# Patient Record
Sex: FEMALE | Race: WHITE | NOT HISPANIC OR LATINO | Employment: OTHER | URBAN - METROPOLITAN AREA
[De-identification: names, ages, dates, MRNs, and addresses within clinical notes are randomized per-mention and may not be internally consistent; named-entity substitution may affect disease eponyms.]

---

## 2019-07-24 DIAGNOSIS — Z12.39 SCREENING BREAST EXAMINATION: Primary | ICD-10-CM

## 2019-08-08 ENCOUNTER — HOSPITAL ENCOUNTER (OUTPATIENT)
Dept: RADIOLOGY | Facility: HOSPITAL | Age: 69
Discharge: HOME | End: 2019-08-08
Payer: MEDICARE

## 2019-08-08 DIAGNOSIS — M54.50 LOW BACK PAIN: Primary | ICD-10-CM

## 2019-08-08 DIAGNOSIS — M54.50 LOW BACK PAIN: ICD-10-CM

## 2019-08-08 PROCEDURE — 72110 X-RAY EXAM L-2 SPINE 4/>VWS: CPT | Mod: TC

## 2019-08-15 ENCOUNTER — HOSPITAL ENCOUNTER (OUTPATIENT)
Dept: RADIOLOGY | Facility: HOSPITAL | Age: 69
Discharge: HOME | End: 2019-08-15
Attending: FAMILY MEDICINE
Payer: MEDICARE

## 2019-08-15 VITALS — WEIGHT: 136 LBS | HEIGHT: 62 IN | BODY MASS INDEX: 25.03 KG/M2

## 2019-08-15 DIAGNOSIS — Z12.39 BREAST CANCER SCREENING: ICD-10-CM

## 2019-08-15 PROCEDURE — 77067 SCR MAMMO BI INCL CAD: CPT | Mod: TC,PO

## 2019-09-10 ENCOUNTER — OFFICE VISIT (OUTPATIENT)
Dept: FAMILY MEDICINE | Facility: CLINIC | Age: 69
End: 2019-09-10
Payer: MEDICARE

## 2019-09-10 VITALS
WEIGHT: 136 LBS | SYSTOLIC BLOOD PRESSURE: 132 MMHG | HEART RATE: 88 BPM | BODY MASS INDEX: 24.87 KG/M2 | DIASTOLIC BLOOD PRESSURE: 70 MMHG

## 2019-09-10 DIAGNOSIS — K21.9 GASTROESOPHAGEAL REFLUX DISEASE, ESOPHAGITIS PRESENCE NOT SPECIFIED: ICD-10-CM

## 2019-09-10 DIAGNOSIS — E78.5 HYPERLIPIDEMIA, UNSPECIFIED HYPERLIPIDEMIA TYPE: ICD-10-CM

## 2019-09-10 DIAGNOSIS — S32.010D CLOSED COMPRESSION FRACTURE OF L1 LUMBAR VERTEBRA WITH ROUTINE HEALING, SUBSEQUENT ENCOUNTER: ICD-10-CM

## 2019-09-10 DIAGNOSIS — F41.9 ANXIETY: ICD-10-CM

## 2019-09-10 DIAGNOSIS — E11.65 UNCONTROLLED TYPE 2 DIABETES MELLITUS WITH HYPERGLYCEMIA: ICD-10-CM

## 2019-09-10 DIAGNOSIS — M89.9 DISEASE OF BONE: ICD-10-CM

## 2019-09-10 DIAGNOSIS — I10 HYPERTENSION, UNSPECIFIED TYPE: Primary | ICD-10-CM

## 2019-09-10 DIAGNOSIS — M51.36 DDD (DEGENERATIVE DISC DISEASE), LUMBAR: ICD-10-CM

## 2019-09-10 PROCEDURE — 99214 PR OFFICE/OUTPT VISIT, EST, LEVL IV, 30-39 MIN: ICD-10-PCS | Mod: S$GLB,,, | Performed by: NURSE PRACTITIONER

## 2019-09-10 PROCEDURE — 99214 OFFICE O/P EST MOD 30 MIN: CPT | Mod: S$GLB,,, | Performed by: NURSE PRACTITIONER

## 2019-09-10 RX ORDER — MELATONIN 5 MG
10 CAPSULE ORAL
COMMUNITY

## 2019-09-10 RX ORDER — ALPRAZOLAM 1 MG/1
1 TABLET ORAL NIGHTLY
Qty: 60 TABLET | Refills: 2 | Status: SHIPPED | OUTPATIENT
Start: 2019-09-10 | End: 2020-03-26 | Stop reason: SDUPTHER

## 2019-09-10 RX ORDER — ATORVASTATIN CALCIUM 40 MG/1
40 TABLET, FILM COATED ORAL DAILY
Qty: 90 TABLET | Refills: 1 | Status: SHIPPED | OUTPATIENT
Start: 2019-09-10 | End: 2019-12-10 | Stop reason: SDUPTHER

## 2019-09-10 RX ORDER — METFORMIN HYDROCHLORIDE 500 MG/1
1 TABLET ORAL 2 TIMES DAILY
COMMUNITY
Start: 2015-11-12 | End: 2019-09-10 | Stop reason: SDUPTHER

## 2019-09-10 RX ORDER — ATORVASTATIN CALCIUM 40 MG/1
1 TABLET, FILM COATED ORAL DAILY
COMMUNITY
Start: 2019-06-12 | End: 2019-09-10 | Stop reason: SDUPTHER

## 2019-09-10 RX ORDER — BENAZEPRIL HYDROCHLORIDE 20 MG/1
20 TABLET ORAL DAILY
Qty: 90 TABLET | Refills: 1 | Status: SHIPPED | OUTPATIENT
Start: 2019-09-10 | End: 2019-12-10 | Stop reason: SDUPTHER

## 2019-09-10 RX ORDER — METFORMIN HYDROCHLORIDE 500 MG/1
500 TABLET ORAL 2 TIMES DAILY
Qty: 180 TABLET | Refills: 1 | Status: SHIPPED | OUTPATIENT
Start: 2019-09-10 | End: 2019-12-10 | Stop reason: SDUPTHER

## 2019-09-10 RX ORDER — HYDROCHLOROTHIAZIDE 12.5 MG/1
12.5 TABLET ORAL DAILY
Qty: 90 TABLET | Refills: 1 | Status: SHIPPED | OUTPATIENT
Start: 2019-09-10 | End: 2019-12-10 | Stop reason: SDUPTHER

## 2019-09-10 NOTE — PATIENT INSTRUCTIONS
Back Pain (Acute or Chronic)    Back pain is one of the most common problems. The good news is that most people feel better in 1 to 2 weeks, and most of the rest in 1 to 2 months. Most people can remain active.  People experience and describe pain differently; not everyone is the same.  · The pain can be sharp, stabbing, shooting, aching, cramping or burning.  · Movement, standing, bending, lifting, sitting, or walking may worsen pain.  · It can be localized to one spot or area, or it can be more generalized.  · It can spread or radiate upwards, to the front, or go down your arms or legs (sciatica).  · It can cause muscle spasm.  Most of the time, mechanical problems with the muscles or spine cause the pain. Mechanical problems are usually caused by an injury to the muscles or ligaments. While illness can cause back pain, it is usually not caused by a serious illness. Mechanical problems include:   · Physical activity such as sports, exercise, work, or normal activity  · Overexertion, lifting, pushing, pulling incorrectly or too aggressively  · Sudden twisting, bending, or stretching from an accident, or accidental movement  · Poor posture  · Stretching or moving wrong, without noticing pain at the time  · Poor coordination, lack of regular exercise (check with your doctor about this)  · Spinal disc disease or arthritis  · Stress  Pain can also be related to pregnancy, or illness like appendicitis, bladder or kidney infections, pelvic infections, and many other things.  Acute back pain usually gets better in 1 to 2 weeks. Back pain related to disk disease, arthritis in the spinal joints or spinal stenosis (narrowing of the spinal canal) can become chronic and last for months or years.  Unless you had a physical injury (for example, a car accident or fall) X-rays are usually not needed for the initial evaluation of back pain. If pain continues and does not respond to medical treatment, X-rays and other tests may be  needed.  Home care  Try these home care recommendations:  · When in bed, try to find a position of comfort. A firm mattress is best. Try lying flat on your back with pillows under your knees. You can also try lying on your side with your knees bent up towards your chest and a pillow between your knees.  · At first, do not try to stretch out the sore spots. If there is a strain, it is not like the good soreness you get after exercising without an injury. In this case, stretching may make it worse.  · Avoid prolong sitting, long car rides, or travel. This puts more stress on the lower back than standing or walking.  · During the first 24 to 72 hours after an acute injury or flare up of chronic back pain, apply an ice pack to the painful area for 20 minutes and then remove it for 20 minutes. Do this over a period of 60 to 90 minutes or several times a day. This will reduce swelling and pain. Wrap the ice pack in a thin towel or plastic to protect your skin.  · You can start with ice, then switch to heat. Heat (hot shower, hot bath, or heating pad) reduces pain and works well for muscle spasms. Heat can be applied to the painful area for 20 minutes then remove it for 20 minutes. Do this over a period of 60 to 90 minutes or several times a day. Do not sleep on a heating pad. It can lead to skin burns or tissue damage.  · You can alternate ice and heat therapy. Talk with your doctor about the best treatment for your back pain.  · Therapeutic massage can help relax the back muscles without stretching them.  · Be aware of safe lifting methods and do not lift anything without stretching first.  Medicines  Talk to your doctor before using medicine, especially if you have other medical problems or are taking other medicines.  · You may use over-the-counter medicine as directed on the bottle to control pain, unless another pain medicine was prescribed. If you have chronic conditions like diabetes, liver or kidney disease,  stomach ulcers, or gastrointestinal bleeding, or are taking blood thinners, talk to your doctor before taking any medicine.  · Be careful if you are given a prescription medicines, narcotics, or medicine for muscle spasms. They can cause drowsiness, affect your coordination, reflexes, and judgement. Do not drive or operate heavy machinery.  Follow-up care  Follow up with your healthcare provider, or as advised.   A radiologist will review any X-rays that were taken. Your provide will notify you of any new findings that may affect your care.  Call 911  Call emergency services if any of the following occur:  · Trouble breathing  · Confusion  · Very drowsy or trouble awakening  · Fainting or loss of consciousness  · Rapid or very slow heart rate  · Loss of bowel or bladder control  When to seek medical advice  Call your healthcare provider right away if any of these occur:   · Pain becomes worse or spreads to your legs  · Weakness or numbness in one or both legs  · Numbness in the groin or genital area  Date Last Reviewed: 7/1/2016  © 6758-7322 [employer] [address] [place] [address], [place] [address]. All rights reserved. This information is not intended as a substitute for professional medical care. Always follow your healthcare professional's instructions.

## 2019-09-10 NOTE — PROGRESS NOTES
Patient ID: Kay Valencia is a 69 y.o. female.    Chief Complaint: Follow-up  Presents for check up. Feeling ok. Under lots of stress. Taking care of dad. Did not take zoloft due to sexual side effects. Scheduled for lumbar procedure with Dr. Mondragon. Had MRI which showed compression fracture on L1. Radiation of pain. No loss of bowel or bladder function. Due for refills. Last labs in June. Watching diet  HPI        Past Medical History:   Diagnosis Date    Anxiety     Depression     Diabetes mellitus, type 2     GERD (gastroesophageal reflux disease)     Hyperlipidemia     Hypertension      Past Surgical History:   Procedure Laterality Date    HYSTERECTOMY      INCONTINENCE SURGERY           Tobacco History:  reports that she has quit smoking. She has never used smokeless tobacco.      Review of patient's allergies indicates:   Allergen Reactions    Flexeril [cyclobenzaprine] Other (See Comments)     Unknown was a long time ago.    Sulfa (sulfonamide antibiotics) Nausea Only    Augmentin [amoxicillin-pot clavulanate] Other (See Comments)     Gas       Current Outpatient Medications:     melatonin 5 mg Cap, Take by mouth., Disp: , Rfl:     metFORMIN (GLUCOPHAGE) 500 MG tablet, Take 1 tablet (500 mg total) by mouth 2 (two) times daily., Disp: 180 tablet, Rfl: 1    ALPRAZolam (XANAX) 1 MG tablet, Take 1 tablet (1 mg total) by mouth every evening., Disp: 60 tablet, Rfl: 2    atorvastatin (LIPITOR) 40 MG tablet, Take 1 tablet (40 mg total) by mouth once daily., Disp: 90 tablet, Rfl: 1    benazepril (LOTENSIN) 20 MG tablet, Take 1 tablet (20 mg total) by mouth once daily., Disp: 90 tablet, Rfl: 1    calcium carbonate/vitamin D3 (CALTRATE 600 PLUS D ORAL), Caltrate 600 plus D, Disp: , Rfl:     estradiol (ESTRACE) 0.01 % (0.1 mg/gram) vaginal cream, Place 1 g vaginally twice a week., Disp: 45 g, Rfl: 6    hydroCHLOROthiazide (HYDRODIURIL) 12.5 MG Tab, Take 1 tablet (12.5 mg total) by mouth once daily.,  Disp: 90 tablet, Rfl: 1    hydrocodone-acetaminophen 5-325mg (NORCO) 5-325 mg per tablet, , Disp: , Rfl:     omega 3-dha-epa-fish oil (FISH OIL) 100-160-1,000 mg Cap, Fish Oil, Disp: , Rfl:     omeprazole (PRILOSEC) 20 MG capsule, , Disp: , Rfl:     Review of Systems   Constitutional: Negative for chills, fever and unexpected weight change.   HENT: Negative for ear pain, rhinorrhea and sore throat.    Eyes: Negative for pain and visual disturbance.   Respiratory: Negative for cough and shortness of breath.    Cardiovascular: Negative for chest pain, palpitations and leg swelling.   Gastrointestinal: Negative for abdominal pain, diarrhea, nausea and vomiting.   Genitourinary: Negative for difficulty urinating and hematuria.   Musculoskeletal: Positive for back pain. Negative for arthralgias.   Skin: Negative for rash.   Neurological: Negative for dizziness, weakness and headaches.   Psychiatric/Behavioral: Positive for agitation. Negative for sleep disturbance. The patient is not nervous/anxious.           Objective:      Vitals:    09/10/19 1302   BP: 132/70   Pulse: 88   Weight: 61.7 kg (136 lb)     Physical Exam   Constitutional: She is oriented to person, place, and time. She appears well-developed and well-nourished.   HENT:   Head: Normocephalic.   Right Ear: External ear normal.   Left Ear: External ear normal.   Mouth/Throat: Oropharynx is clear and moist.   Eyes: Pupils are equal, round, and reactive to light. Conjunctivae are normal.   Neck: Normal range of motion. Neck supple. No JVD present.   Cardiovascular: Normal rate and regular rhythm.   No murmur heard.  Pulmonary/Chest: Effort normal and breath sounds normal.   Abdominal: Soft. Bowel sounds are normal.   Musculoskeletal: She exhibits no edema or deformity.        Lumbar back: She exhibits decreased range of motion and tenderness.   Lymphadenopathy:     She has no cervical adenopathy.   Neurological: She is alert and oriented to person, place,  and time. Gait normal.   Skin: Skin is warm, dry and intact. No rash noted.   Psychiatric: She has a normal mood and affect. Her speech is normal and behavior is normal.         Assessment:       1. Hypertension, unspecified type    2. Uncontrolled type 2 diabetes mellitus with hyperglycemia    3. Anxiety    4. Hyperlipidemia, unspecified hyperlipidemia type    5. Disease of bone    6. Gastroesophageal reflux disease, esophagitis presence not specified    7. Closed compression fracture of L1 lumbar vertebra with routine healing, subsequent encounter    8. DDD (degenerative disc disease), lumbar           Plan:       Hypertension, unspecified type  Comments:  stable  Orders:  -     benazepril (LOTENSIN) 20 MG tablet; Take 1 tablet (20 mg total) by mouth once daily.  Dispense: 90 tablet; Refill: 1  -     hydroCHLOROthiazide (HYDRODIURIL) 12.5 MG Tab; Take 1 tablet (12.5 mg total) by mouth once daily.  Dispense: 90 tablet; Refill: 1    Uncontrolled type 2 diabetes mellitus with hyperglycemia  -     metFORMIN (GLUCOPHAGE) 500 MG tablet; Take 1 tablet (500 mg total) by mouth 2 (two) times daily.  Dispense: 180 tablet; Refill: 1    Anxiety  Comments:  continue xanax prn. call if would like additional meds  Orders:  -     ALPRAZolam (XANAX) 1 MG tablet; Take 1 tablet (1 mg total) by mouth every evening.  Dispense: 60 tablet; Refill: 2    Hyperlipidemia, unspecified hyperlipidemia type  -     atorvastatin (LIPITOR) 40 MG tablet; Take 1 tablet (40 mg total) by mouth once daily.  Dispense: 90 tablet; Refill: 1    Disease of bone  -     DXA Bone Density Spine And Hip; Future; Expected date: 09/10/2019    Gastroesophageal reflux disease, esophagitis presence not specified    Closed compression fracture of L1 lumbar vertebra with routine healing, subsequent encounter  Comments:  keep appointment with dr. burnett as scheduled  Orders:  -     DXA Bone Density Spine And Hip; Future; Expected date: 09/10/2019    DDD (degenerative disc  disease), lumbar      No follow-ups on file.        9/10/2019 Karolnia Ramirez NP

## 2019-09-11 DIAGNOSIS — E11.65 UNCONTROLLED TYPE 2 DIABETES MELLITUS WITH HYPERGLYCEMIA: Primary | ICD-10-CM

## 2019-09-11 RX ORDER — LANCETS 33 GAUGE
1 EACH MISCELLANEOUS
COMMUNITY
End: 2019-09-11 | Stop reason: SDUPTHER

## 2019-09-11 RX ORDER — LANCETS 33 GAUGE
1 EACH MISCELLANEOUS
Qty: 100 EACH | Refills: 3 | Status: SHIPPED | OUTPATIENT
Start: 2019-09-11 | End: 2019-09-17 | Stop reason: SDUPTHER

## 2019-09-11 NOTE — TELEPHONE ENCOUNTER
----- Message from Azul Vivas sent at 9/11/2019 11:42 AM CDT -----  Pt needs refill on one touch lancets delca gauge 33. Test 3 times a week  [facility]  [phone]

## 2019-09-16 ENCOUNTER — HOSPITAL ENCOUNTER (OUTPATIENT)
Dept: RADIOLOGY | Facility: HOSPITAL | Age: 69
Discharge: HOME | End: 2019-09-16
Attending: NURSE PRACTITIONER
Payer: MEDICARE

## 2019-09-16 DIAGNOSIS — M89.9 DISEASE OF BONE: ICD-10-CM

## 2019-09-16 DIAGNOSIS — S32.010D CLOSED COMPRESSION FRACTURE OF L1 LUMBAR VERTEBRA WITH ROUTINE HEALING, SUBSEQUENT ENCOUNTER: ICD-10-CM

## 2019-09-16 PROCEDURE — 77080 DXA BONE DENSITY AXIAL: CPT | Mod: TC,PO

## 2019-09-17 DIAGNOSIS — E11.65 UNCONTROLLED TYPE 2 DIABETES MELLITUS WITH HYPERGLYCEMIA: ICD-10-CM

## 2019-09-17 RX ORDER — LANCETS 33 GAUGE
1 EACH MISCELLANEOUS
Qty: 100 EACH | Refills: 3 | Status: SHIPPED | OUTPATIENT
Start: 2019-09-18 | End: 2021-08-09 | Stop reason: SDUPTHER

## 2019-09-17 NOTE — TELEPHONE ENCOUNTER
----- Message from Olga Kyle sent at 9/17/2019 11:36 AM CDT -----  Contact: Kay Bradshaw One touch delica lancets 33 kenny ultra fine needles. 100 comes in a box.  [facility] in [place] [address]pts # [phone] [facility]   
Set up to be sent.  
Attending Only

## 2019-09-19 ENCOUNTER — TELEPHONE (OUTPATIENT)
Dept: FAMILY MEDICINE | Facility: CLINIC | Age: 69
End: 2019-09-19

## 2019-09-19 NOTE — TELEPHONE ENCOUNTER
Spoke with pt and let her know that the dexa still shows osteopenia but there is an increase in bone density. Pt agrees to continue caltrate.

## 2019-09-19 NOTE — TELEPHONE ENCOUNTER
----- Message from Karolina Ramirez NP sent at 9/18/2019 10:37 PM CDT -----  Dexa shows osteopenia however overall there has been increase in bone density since previous scan.

## 2019-09-20 ENCOUNTER — TELEPHONE (OUTPATIENT)
Dept: FAMILY MEDICINE | Facility: CLINIC | Age: 69
End: 2019-09-20

## 2019-09-20 NOTE — TELEPHONE ENCOUNTER
----- Message from Olga Kyle sent at 9/20/2019  9:10 AM CDT -----  Contact: Kay velasquez The patients back surgery is October.  It was resched . pts # [phone]

## 2019-09-25 ENCOUNTER — TELEPHONE (OUTPATIENT)
Dept: FAMILY MEDICINE | Facility: CLINIC | Age: 69
End: 2019-09-25

## 2019-09-25 DIAGNOSIS — R30.0 DYSURIA: Primary | ICD-10-CM

## 2019-09-27 ENCOUNTER — TELEPHONE (OUTPATIENT)
Dept: FAMILY MEDICINE | Facility: CLINIC | Age: 69
End: 2019-09-27

## 2019-09-27 DIAGNOSIS — N30.00 ACUTE CYSTITIS WITHOUT HEMATURIA: Primary | ICD-10-CM

## 2019-09-27 RX ORDER — CIPROFLOXACIN 500 MG/1
500 TABLET ORAL EVERY 12 HOURS
Qty: 14 TABLET | Refills: 0 | Status: SHIPPED | OUTPATIENT
Start: 2019-09-27 | End: 2019-10-04

## 2019-09-27 NOTE — TELEPHONE ENCOUNTER
Spoke with pt and let her know we sent in Cipro for her. Will call back when we get culture results.

## 2019-09-27 NOTE — TELEPHONE ENCOUNTER
----- Message from Barbara Diaz sent at 9/27/2019 11:01 AM CDT -----  Pt wants to know the results of her urine specimen. She is feeling really miserable.

## 2019-09-27 NOTE — TELEPHONE ENCOUNTER
Please let pt know UA does have a lot of WBCs- I have sent in rx for cipro 500mg BID for 7 days and we'll see what final urine culture shows

## 2019-09-29 LAB
APPEARANCE UR: ABNORMAL
BACTERIA #/AREA URNS HPF: ABNORMAL /HPF
BACTERIA UR CULT: ABNORMAL
BACTERIA UR CULT: ABNORMAL
BILIRUB UR QL STRIP: NEGATIVE
COLOR UR: YELLOW
GLUCOSE UR QL STRIP: ABNORMAL
HGB UR QL STRIP: NEGATIVE
HYALINE CASTS #/AREA URNS LPF: ABNORMAL /LPF
KETONES UR QL STRIP: NEGATIVE
LEUKOCYTE ESTERASE UR QL STRIP: ABNORMAL
NITRITE UR QL STRIP: NEGATIVE
PH UR STRIP: 6 [PH] (ref 5–8)
PROT UR QL STRIP: NEGATIVE
RBC #/AREA URNS HPF: ABNORMAL /HPF
SP GR UR STRIP: 1.01 (ref 1–1.03)
SQUAMOUS #/AREA URNS HPF: ABNORMAL /HPF
WBC #/AREA URNS HPF: ABNORMAL /HPF

## 2019-10-01 ENCOUNTER — TELEPHONE (OUTPATIENT)
Dept: FAMILY MEDICINE | Facility: CLINIC | Age: 69
End: 2019-10-01

## 2019-10-01 NOTE — TELEPHONE ENCOUNTER
----- Message from Karolina Ramirez NP sent at 9/30/2019 10:57 PM CDT -----  cipro that was prescribed per Dr. Juarez will treat infection.

## 2019-11-13 ENCOUNTER — TELEPHONE (OUTPATIENT)
Dept: FAMILY MEDICINE | Facility: CLINIC | Age: 69
End: 2019-11-13

## 2019-11-13 NOTE — TELEPHONE ENCOUNTER
----- Message from Barbara Diaz sent at 11/13/2019 11:48 AM CST -----  Pt is having some back issues and would like to know if she can take ibuprofen or what ever you suggest? Pt #[phone]

## 2019-11-13 NOTE — TELEPHONE ENCOUNTER
Spoke with pt - Informed that ibuprofen 400mg TID will be fine to take with food or milk. Pt verbalized confirmation.

## 2019-12-10 ENCOUNTER — OFFICE VISIT (OUTPATIENT)
Dept: FAMILY MEDICINE | Facility: CLINIC | Age: 69
End: 2019-12-10
Payer: MEDICARE

## 2019-12-10 VITALS
SYSTOLIC BLOOD PRESSURE: 130 MMHG | DIASTOLIC BLOOD PRESSURE: 60 MMHG | WEIGHT: 138 LBS | HEART RATE: 64 BPM | BODY MASS INDEX: 24.45 KG/M2 | HEIGHT: 63 IN

## 2019-12-10 DIAGNOSIS — E78.5 HYPERLIPIDEMIA, UNSPECIFIED HYPERLIPIDEMIA TYPE: ICD-10-CM

## 2019-12-10 DIAGNOSIS — I10 HYPERTENSION, UNSPECIFIED TYPE: ICD-10-CM

## 2019-12-10 DIAGNOSIS — E11.65 UNCONTROLLED TYPE 2 DIABETES MELLITUS WITH HYPERGLYCEMIA: ICD-10-CM

## 2019-12-10 DIAGNOSIS — F41.9 ANXIETY: ICD-10-CM

## 2019-12-10 DIAGNOSIS — M15.9 OSTEOARTHRITIS OF MULTIPLE JOINTS, UNSPECIFIED OSTEOARTHRITIS TYPE: ICD-10-CM

## 2019-12-10 DIAGNOSIS — F51.01 PRIMARY INSOMNIA: Primary | ICD-10-CM

## 2019-12-10 LAB — HBA1C MFR BLD: 5.6 %

## 2019-12-10 PROCEDURE — 3078F PR MOST RECENT DIASTOLIC BLOOD PRESSURE < 80 MM HG: ICD-10-PCS | Mod: S$GLB,,, | Performed by: NURSE PRACTITIONER

## 2019-12-10 PROCEDURE — 3044F PR MOST RECENT HEMOGLOBIN A1C LEVEL <7.0%: ICD-10-PCS | Mod: S$GLB,,, | Performed by: NURSE PRACTITIONER

## 2019-12-10 PROCEDURE — 83036 POCT HEMOGLOBIN A1C: ICD-10-PCS | Mod: QW,,, | Performed by: NURSE PRACTITIONER

## 2019-12-10 PROCEDURE — 3075F SYST BP GE 130 - 139MM HG: CPT | Mod: S$GLB,,, | Performed by: NURSE PRACTITIONER

## 2019-12-10 PROCEDURE — 1159F MED LIST DOCD IN RCRD: CPT | Mod: S$GLB,,, | Performed by: NURSE PRACTITIONER

## 2019-12-10 PROCEDURE — 1101F PT FALLS ASSESS-DOCD LE1/YR: CPT | Mod: S$GLB,,, | Performed by: NURSE PRACTITIONER

## 2019-12-10 PROCEDURE — 3044F HG A1C LEVEL LT 7.0%: CPT | Mod: S$GLB,,, | Performed by: NURSE PRACTITIONER

## 2019-12-10 PROCEDURE — 1101F PR PT FALLS ASSESS DOC 0-1 FALLS W/OUT INJ PAST YR: ICD-10-PCS | Mod: S$GLB,,, | Performed by: NURSE PRACTITIONER

## 2019-12-10 PROCEDURE — 99214 PR OFFICE/OUTPT VISIT, EST, LEVL IV, 30-39 MIN: ICD-10-PCS | Mod: S$GLB,,, | Performed by: NURSE PRACTITIONER

## 2019-12-10 PROCEDURE — 3078F DIAST BP <80 MM HG: CPT | Mod: S$GLB,,, | Performed by: NURSE PRACTITIONER

## 2019-12-10 PROCEDURE — 99214 OFFICE O/P EST MOD 30 MIN: CPT | Mod: S$GLB,,, | Performed by: NURSE PRACTITIONER

## 2019-12-10 PROCEDURE — 83036 HEMOGLOBIN GLYCOSYLATED A1C: CPT | Mod: QW,,, | Performed by: NURSE PRACTITIONER

## 2019-12-10 PROCEDURE — 3075F PR MOST RECENT SYSTOLIC BLOOD PRESS GE 130-139MM HG: ICD-10-PCS | Mod: S$GLB,,, | Performed by: NURSE PRACTITIONER

## 2019-12-10 PROCEDURE — 1159F PR MEDICATION LIST DOCUMENTED IN MEDICAL RECORD: ICD-10-PCS | Mod: S$GLB,,, | Performed by: NURSE PRACTITIONER

## 2019-12-10 RX ORDER — HYDROCHLOROTHIAZIDE 12.5 MG/1
12.5 TABLET ORAL DAILY
Qty: 90 TABLET | Refills: 1 | Status: SHIPPED | OUTPATIENT
Start: 2019-12-10 | End: 2020-12-10 | Stop reason: SDUPTHER

## 2019-12-10 RX ORDER — ATORVASTATIN CALCIUM 40 MG/1
40 TABLET, FILM COATED ORAL DAILY
Qty: 90 TABLET | Refills: 1 | Status: SHIPPED | OUTPATIENT
Start: 2019-12-10 | End: 2020-09-10 | Stop reason: SDUPTHER

## 2019-12-10 RX ORDER — BENAZEPRIL HYDROCHLORIDE 20 MG/1
20 TABLET ORAL DAILY
Qty: 90 TABLET | Refills: 1 | Status: SHIPPED | OUTPATIENT
Start: 2019-12-10 | End: 2020-09-10 | Stop reason: SDUPTHER

## 2019-12-10 RX ORDER — HYDROCODONE BITARTRATE AND ACETAMINOPHEN 10; 325 MG/1; MG/1
TABLET ORAL
Refills: 0 | COMMUNITY
Start: 2019-09-20 | End: 2020-12-10 | Stop reason: SDUPTHER

## 2019-12-10 RX ORDER — ALPRAZOLAM 1 MG/1
1 TABLET ORAL NIGHTLY
Qty: 60 TABLET | Refills: 2 | Status: CANCELLED | OUTPATIENT
Start: 2019-12-10

## 2019-12-10 RX ORDER — CLONAZEPAM 1 MG/1
1 TABLET, ORALLY DISINTEGRATING ORAL 2 TIMES DAILY PRN
Qty: 60 TABLET | Refills: 2 | Status: SHIPPED | OUTPATIENT
Start: 2019-12-10 | End: 2019-12-19

## 2019-12-10 RX ORDER — METFORMIN HYDROCHLORIDE 500 MG/1
500 TABLET ORAL 2 TIMES DAILY
Qty: 180 TABLET | Refills: 1 | Status: SHIPPED | OUTPATIENT
Start: 2019-12-10 | End: 2020-09-10 | Stop reason: SDUPTHER

## 2019-12-10 NOTE — PROGRESS NOTES
SUBJECTIVE:    Patient ID: Kay Valencia is a 69 y.o. female.    Chief Complaint: Follow-up (brought bottles, Xanax goes to [facility]// )    Presents for check up. Doing well. Still helping to care for dad. Receiving dexter in lower back which seem to be helping some. Not sleeping well. Takes xanax about 10ish but wakes up frequently during the night. Does not exercise. Due for labs.       Office Visit on 12/10/2019   Component Date Value Ref Range Status    Hemoglobin A1C 12/10/2019 5.6  % Final   Telephone on 09/25/2019   Component Date Value Ref Range Status    Color, UA 09/26/2019 YELLOW  YELLOW Final    Appearance, UA 09/26/2019 CLOUDY* CLEAR Final    Specific Gravity, UA 09/26/2019 1.013  1.001 - 1.035 Final    pH, UA 09/26/2019 6.0  5.0 - 8.0 Final    Glucose, UA 09/26/2019 1+* NEGATIVE Final    Bilirubin, UA 09/26/2019 NEGATIVE  NEGATIVE Final    Ketones, UA 09/26/2019 NEGATIVE  NEGATIVE Final    Occult Blood UA 09/26/2019 NEGATIVE  NEGATIVE Final    Protein, UA 09/26/2019 NEGATIVE  NEGATIVE Final    Nitrite, UA 09/26/2019 NEGATIVE  NEGATIVE Final    Leukocytes, UA 09/26/2019 3+* NEGATIVE Final    WBC Casts, UA 09/26/2019 > OR = 60* < OR = 5 /HPF Final    RBC Casts, UA 09/26/2019 NONE SEEN  < OR = 2 /HPF Final    Squam Epithel, UA 09/26/2019 NONE SEEN  < OR = 5 /HPF Final    Bacteria, UA 09/26/2019 NONE SEEN  NONE SEEN /HPF Final    Hyaline Casts, UA 09/26/2019 NONE SEEN  NONE SEEN /LPF Final    Reflexive Urine Culture 09/26/2019 CULTURE INDICATED - RESULTS TO FOLLOW   Final    Urine Culture, Routine 09/26/2019 *  Final       Past Medical History:   Diagnosis Date    Anxiety     Depression     Diabetes mellitus, type 2     GERD (gastroesophageal reflux disease)     Hyperlipidemia     Hypertension      Past Surgical History:   Procedure Laterality Date    HYSTERECTOMY      INCONTINENCE SURGERY       Family History   Problem Relation Age of Onset    Diabetes Maternal Grandmother      Hypertension Maternal Grandmother     Diabetes Father     Breast cancer Mother     Hypertension Mother     Diabetes Sister     Ovarian cancer Neg Hx        Marital Status:   Alcohol History:  reports that she does not drink alcohol.  Tobacco History:  reports that she has quit smoking. She has never used smokeless tobacco.  Drug History:  reports that she does not use drugs.    Review of patient's allergies indicates:   Allergen Reactions    Flexeril [cyclobenzaprine] Other (See Comments)     Unknown was a long time ago.    Sulfa (sulfonamide antibiotics) Nausea Only    Augmentin [amoxicillin-pot clavulanate] Other (See Comments)     Gas       Current Outpatient Medications:     ALPRAZolam (XANAX) 1 MG tablet, Take 1 tablet (1 mg total) by mouth every evening., Disp: 60 tablet, Rfl: 2    atorvastatin (LIPITOR) 40 MG tablet, Take 1 tablet (40 mg total) by mouth once daily., Disp: 90 tablet, Rfl: 1    benazepril (LOTENSIN) 20 MG tablet, Take 1 tablet (20 mg total) by mouth once daily., Disp: 90 tablet, Rfl: 1    calcium carbonate/vitamin D3 (CALTRATE 600 PLUS D ORAL), Caltrate 600 plus D, Disp: , Rfl:     hydroCHLOROthiazide (HYDRODIURIL) 12.5 MG Tab, Take 1 tablet (12.5 mg total) by mouth once daily., Disp: 90 tablet, Rfl: 1    HYDROcodone-acetaminophen (NORCO)  mg per tablet, TK 1 T PO  BID PRN, Disp: , Rfl: 0    lancets 33 gauge Misc, 1 lancet by Misc.(Non-Drug; Combo Route) route 3 (three) times a week., Disp: 100 each, Rfl: 3    melatonin 5 mg Cap, Take by mouth., Disp: , Rfl:     metFORMIN (GLUCOPHAGE) 500 MG tablet, Take 1 tablet (500 mg total) by mouth 2 (two) times daily., Disp: 180 tablet, Rfl: 1    omeprazole (PRILOSEC) 20 MG capsule, , Disp: , Rfl:     clonazePAM (KLONOPIN) 1 MG disintegrating tablet, Take 1 tablet (1 mg total) by mouth 2 (two) times daily as needed., Disp: 60 tablet, Rfl: 2    estradiol (ESTRACE) 0.01 % (0.1 mg/gram) vaginal cream, Place 1 g vaginally  "twice a week., Disp: 45 g, Rfl: 6    omega 3-dha-epa-fish oil (FISH OIL) 100-160-1,000 mg Cap, Fish Oil, Disp: , Rfl:     Review of Systems   Constitutional: Negative for chills, fever and unexpected weight change.   HENT: Negative for ear pain, rhinorrhea and sore throat.    Eyes: Negative for pain and visual disturbance.   Respiratory: Negative for cough and shortness of breath.    Cardiovascular: Negative for chest pain, palpitations and leg swelling.   Gastrointestinal: Negative for abdominal pain, diarrhea, nausea and vomiting.   Genitourinary: Negative for difficulty urinating, hematuria and vaginal bleeding.   Musculoskeletal: Positive for back pain. Negative for arthralgias.   Skin: Negative for rash.   Neurological: Negative for dizziness, weakness and headaches.   Psychiatric/Behavioral: Negative for agitation and sleep disturbance. The patient is not nervous/anxious.           Objective:      Vitals:    12/10/19 1131   BP: 130/60   Pulse: 64   Weight: 62.6 kg (138 lb)   Height: 5' 3" (1.6 m)     Body mass index is 24.45 kg/m².  Physical Exam   Constitutional: She is oriented to person, place, and time. She appears well-developed and well-nourished.   HENT:   Head: Normocephalic and atraumatic.   Right Ear: External ear normal.   Left Ear: External ear normal.   Eyes: Pupils are equal, round, and reactive to light. EOM are normal.   Neck: Normal range of motion. Neck supple.   Cardiovascular: Normal rate, regular rhythm and normal heart sounds.   Pulses:       Dorsalis pedis pulses are 2+ on the right side.        Posterior tibial pulses are 2+ on the right side.   Pulmonary/Chest: Effort normal and breath sounds normal.   Abdominal: Soft. Bowel sounds are normal.   Musculoskeletal: Normal range of motion. She exhibits no edema or deformity.        Right foot: There is normal range of motion and no deformity.   Feet:   Right Foot:   Protective Sensation: 5 sites tested. 5 sites sensed.   Left Foot: "   Protective Sensation: 5 sites tested. 5 sites sensed.   Skin Integrity: Negative for skin breakdown.   Lymphadenopathy:     She has no cervical adenopathy.   Neurological: She is alert and oriented to person, place, and time.   Skin: Skin is warm and dry.   Psychiatric: She has a normal mood and affect. Her behavior is normal.         Assessment:       1. Primary insomnia    2. Hyperlipidemia, unspecified hyperlipidemia type    3. Hypertension, unspecified type    4. Uncontrolled type 2 diabetes mellitus with hyperglycemia    5. Anxiety    6. Osteoarthritis of multiple joints, unspecified osteoarthritis type         Plan:       Primary insomnia    Hyperlipidemia, unspecified hyperlipidemia type  -     atorvastatin (LIPITOR) 40 MG tablet; Take 1 tablet (40 mg total) by mouth once daily.  Dispense: 90 tablet; Refill: 1  -     Comprehensive metabolic panel; Future; Expected date: 12/10/2019  -     TSH w/reflex to FT4; Future; Expected date: 12/10/2019    Hypertension, unspecified type  Comments:  stable  Orders:  -     benazepril (LOTENSIN) 20 MG tablet; Take 1 tablet (20 mg total) by mouth once daily.  Dispense: 90 tablet; Refill: 1  -     hydroCHLOROthiazide (HYDRODIURIL) 12.5 MG Tab; Take 1 tablet (12.5 mg total) by mouth once daily.  Dispense: 90 tablet; Refill: 1  -     CBC auto differential; Future; Expected date: 12/10/2019  -     Comprehensive metabolic panel; Future; Expected date: 12/10/2019  -     TSH w/reflex to FT4; Future; Expected date: 12/10/2019  -     Lipid panel; Future; Expected date: 12/10/2019    Uncontrolled type 2 diabetes mellitus with hyperglycemia  -     metFORMIN (GLUCOPHAGE) 500 MG tablet; Take 1 tablet (500 mg total) by mouth 2 (two) times daily.  Dispense: 180 tablet; Refill: 1  -     Hemoglobin A1C, POCT  -     Urinalysis, Reflex to Urine Culture Urine, Clean Catch; Future; Expected date: 12/10/2019  -     Lipid panel; Future; Expected date: 12/10/2019    Anxiety  Comments:  try  klonopin. stop xanax  Orders:  -     clonazePAM (KLONOPIN) 1 MG disintegrating tablet; Take 1 tablet (1 mg total) by mouth 2 (two) times daily as needed.  Dispense: 60 tablet; Refill: 2  -     Lipid panel; Future; Expected date: 12/10/2019    Osteoarthritis of multiple joints, unspecified osteoarthritis type      Follow up in about 6 months (around 6/10/2020) for Diabetic Check-Up.

## 2019-12-12 LAB
ALBUMIN SERPL-MCNC: 4.5 G/DL (ref 3.6–5.1)
ALBUMIN/GLOB SERPL: 2 (CALC) (ref 1–2.5)
ALP SERPL-CCNC: 72 U/L (ref 33–130)
ALT SERPL-CCNC: 22 U/L (ref 6–29)
APPEARANCE UR: CLEAR
AST SERPL-CCNC: 18 U/L (ref 10–35)
BACTERIA #/AREA URNS HPF: ABNORMAL /HPF
BACTERIA UR CULT: ABNORMAL
BACTERIA UR CULT: NORMAL
BASOPHILS # BLD AUTO: 49 CELLS/UL (ref 0–200)
BASOPHILS NFR BLD AUTO: 1.3 %
BILIRUB SERPL-MCNC: 0.9 MG/DL (ref 0.2–1.2)
BILIRUB UR QL STRIP: NEGATIVE
BUN SERPL-MCNC: 19 MG/DL (ref 7–25)
BUN/CREAT SERPL: ABNORMAL (CALC) (ref 6–22)
CALCIUM SERPL-MCNC: 10 MG/DL (ref 8.6–10.4)
CHLORIDE SERPL-SCNC: 101 MMOL/L (ref 98–110)
CHOLEST SERPL-MCNC: 206 MG/DL
CHOLEST/HDLC SERPL: 3.5 (CALC)
CO2 SERPL-SCNC: 28 MMOL/L (ref 20–32)
COLOR UR: YELLOW
CREAT SERPL-MCNC: 0.8 MG/DL (ref 0.5–0.99)
EOSINOPHIL # BLD AUTO: 201 CELLS/UL (ref 15–500)
EOSINOPHIL NFR BLD AUTO: 5.3 %
ERYTHROCYTE [DISTWIDTH] IN BLOOD BY AUTOMATED COUNT: 13.1 % (ref 11–15)
GFRSERPLBLD MDRD-ARVRAT: 76 ML/MIN/1.73M2
GLOBULIN SER CALC-MCNC: 2.3 G/DL (CALC) (ref 1.9–3.7)
GLUCOSE SERPL-MCNC: 108 MG/DL (ref 65–99)
GLUCOSE UR QL STRIP: NEGATIVE
HCT VFR BLD AUTO: 34.5 % (ref 35–45)
HDLC SERPL-MCNC: 59 MG/DL
HGB BLD-MCNC: 12.1 G/DL (ref 11.7–15.5)
HGB UR QL STRIP: NEGATIVE
HYALINE CASTS #/AREA URNS LPF: ABNORMAL /LPF
KETONES UR QL STRIP: NEGATIVE
LDLC SERPL CALC-MCNC: 113 MG/DL (CALC)
LEUKOCYTE ESTERASE UR QL STRIP: ABNORMAL
LYMPHOCYTES # BLD AUTO: 1079 CELLS/UL (ref 850–3900)
LYMPHOCYTES NFR BLD AUTO: 28.4 %
MCH RBC QN AUTO: 31.7 PG (ref 27–33)
MCHC RBC AUTO-ENTMCNC: 35.1 G/DL (ref 32–36)
MCV RBC AUTO: 90.3 FL (ref 80–100)
MONOCYTES # BLD AUTO: 369 CELLS/UL (ref 200–950)
MONOCYTES NFR BLD AUTO: 9.7 %
NEUTROPHILS # BLD AUTO: 2101 CELLS/UL (ref 1500–7800)
NEUTROPHILS NFR BLD AUTO: 55.3 %
NITRITE UR QL STRIP: NEGATIVE
NONHDLC SERPL-MCNC: 147 MG/DL (CALC)
PH UR STRIP: 6.5 [PH] (ref 5–8)
PLATELET # BLD AUTO: 144 THOUSAND/UL (ref 140–400)
PMV BLD REES-ECKER: 11.5 FL (ref 7.5–12.5)
POTASSIUM SERPL-SCNC: 4 MMOL/L (ref 3.5–5.3)
PROT SERPL-MCNC: 6.8 G/DL (ref 6.1–8.1)
PROT UR QL STRIP: NEGATIVE
RBC # BLD AUTO: 3.82 MILLION/UL (ref 3.8–5.1)
RBC #/AREA URNS HPF: ABNORMAL /HPF
SODIUM SERPL-SCNC: 140 MMOL/L (ref 135–146)
SP GR UR STRIP: 1.01 (ref 1–1.03)
SQUAMOUS #/AREA URNS HPF: ABNORMAL /HPF
TRIGL SERPL-MCNC: 224 MG/DL
TSH SERPL-ACNC: 0.4 MIU/L (ref 0.4–4.5)
WBC # BLD AUTO: 3.8 THOUSAND/UL (ref 3.8–10.8)
WBC #/AREA URNS HPF: ABNORMAL /HPF

## 2019-12-19 NOTE — TELEPHONE ENCOUNTER
Spoke with pt, Wants to try Trazodone or Temazepam along with the Xanax. Since she was not able to take the klonopin and xanax together. Aware eva is out until Monday and states it is okay to wait until then.

## 2019-12-19 NOTE — TELEPHONE ENCOUNTER
----- Message from Zuhair Morgan sent at 12/19/2019  3:54 PM CST -----  Pt is saying that the klonopin makes her heart race needing something else called in   Pt [phone]

## 2019-12-20 RX ORDER — TRAZODONE HYDROCHLORIDE 50 MG/1
50 TABLET ORAL NIGHTLY
Qty: 30 TABLET | Refills: 2 | Status: SHIPPED | OUTPATIENT
Start: 2019-12-20 | End: 2020-06-10

## 2019-12-31 ENCOUNTER — TELEPHONE (OUTPATIENT)
Dept: FAMILY MEDICINE | Facility: CLINIC | Age: 69
End: 2019-12-31

## 2019-12-31 NOTE — TELEPHONE ENCOUNTER
Spoke to patient with results verbatim per Karolina. Verbalized understanding. Wants to know if she can start her Meloxicam again. Send message to pool as Jami is leaving shortly.

## 2019-12-31 NOTE — TELEPHONE ENCOUNTER
----- Message from Karolina Ramirez NP sent at 12/31/2019  3:03 PM CST -----  Triglycerides are slightly elevated.  Start low-fat diet.  The rest of the labs are within normal limits.

## 2020-03-26 DIAGNOSIS — F41.9 ANXIETY: ICD-10-CM

## 2020-03-26 RX ORDER — ALPRAZOLAM 1 MG/1
1 TABLET ORAL NIGHTLY
Qty: 30 TABLET | Refills: 2 | Status: SHIPPED | OUTPATIENT
Start: 2020-03-26 | End: 2020-07-06 | Stop reason: SDUPTHER

## 2020-03-26 NOTE — TELEPHONE ENCOUNTER
----- Message from Faiza Grace sent at 3/26/2020  8:43 AM CDT -----  Contact: Kay Redd  Pt needs refill on her Alprazolam   Send to [facility] in [address] [place]  Pt# [phone]

## 2020-04-02 ENCOUNTER — TELEPHONE (OUTPATIENT)
Dept: FAMILY MEDICINE | Facility: CLINIC | Age: 70
End: 2020-04-02

## 2020-04-02 ENCOUNTER — OFFICE VISIT (OUTPATIENT)
Dept: FAMILY MEDICINE | Facility: CLINIC | Age: 70
End: 2020-04-02
Payer: MEDICARE

## 2020-04-02 DIAGNOSIS — L02.91 ABSCESS: Primary | ICD-10-CM

## 2020-04-02 DIAGNOSIS — F51.01 PRIMARY INSOMNIA: ICD-10-CM

## 2020-04-02 DIAGNOSIS — I10 ESSENTIAL HYPERTENSION: ICD-10-CM

## 2020-04-02 PROCEDURE — 99213 OFFICE O/P EST LOW 20 MIN: CPT | Mod: 95,,, | Performed by: NURSE PRACTITIONER

## 2020-04-02 PROCEDURE — 1101F PR PT FALLS ASSESS DOC 0-1 FALLS W/OUT INJ PAST YR: ICD-10-PCS | Mod: 95,,, | Performed by: NURSE PRACTITIONER

## 2020-04-02 PROCEDURE — 1101F PT FALLS ASSESS-DOCD LE1/YR: CPT | Mod: 95,,, | Performed by: NURSE PRACTITIONER

## 2020-04-02 PROCEDURE — 1159F PR MEDICATION LIST DOCUMENTED IN MEDICAL RECORD: ICD-10-PCS | Mod: 95,,, | Performed by: NURSE PRACTITIONER

## 2020-04-02 PROCEDURE — 1159F MED LIST DOCD IN RCRD: CPT | Mod: 95,,, | Performed by: NURSE PRACTITIONER

## 2020-04-02 PROCEDURE — 99213 PR OFFICE/OUTPT VISIT, EST, LEVL III, 20-29 MIN: ICD-10-PCS | Mod: 95,,, | Performed by: NURSE PRACTITIONER

## 2020-04-02 RX ORDER — MUPIROCIN 20 MG/G
OINTMENT TOPICAL 3 TIMES DAILY
Qty: 22 G | Refills: 0 | Status: SHIPPED | OUTPATIENT
Start: 2020-04-02 | End: 2020-04-12

## 2020-04-02 RX ORDER — CEPHALEXIN 500 MG/1
500 CAPSULE ORAL EVERY 8 HOURS
Qty: 21 CAPSULE | Refills: 0 | Status: SHIPPED | OUTPATIENT
Start: 2020-04-02 | End: 2020-06-10

## 2020-04-02 NOTE — PROGRESS NOTES
Subjective:        The chief complaint leading to consultation is: sore on bottom  The patient location is:  Home  Visit type: Virtual visit with synchronous audio and video  This was a video visit in lieu of in-person visit due to the coronavirus emergency. Patient acknowledged and consented to the video visit encounter.     69-year-old female presents for virtual visit.  She reports possible well on buttocks.  Noticed about 3 days ago.  No drainage from site.  No fever.  Patient does have a history of abscesses.  Has not taken anything.  Reports that has been trying to keep site clean.    Stress is manageable.  Sleeping okay.          Past Surgical History:   Procedure Laterality Date    HYSTERECTOMY      INCONTINENCE SURGERY       Past Medical History:   Diagnosis Date    Anxiety     Depression     Diabetes mellitus, type 2     GERD (gastroesophageal reflux disease)     Hyperlipidemia     Hypertension      Family History   Problem Relation Age of Onset    Diabetes Maternal Grandmother     Hypertension Maternal Grandmother     Diabetes Father     Breast cancer Mother     Hypertension Mother     Diabetes Sister     Ovarian cancer Neg Hx         Social History:   Marital Status:   Alcohol History:  reports that she does not drink alcohol.  Tobacco History:  reports that she has quit smoking. She has never used smokeless tobacco.  Drug History:  reports that she does not use drugs.    Review of patient's allergies indicates:   Allergen Reactions    Flexeril [cyclobenzaprine] Other (See Comments)     Unknown was a long time ago.    Sulfa (sulfonamide antibiotics) Nausea Only    Augmentin [amoxicillin-pot clavulanate] Other (See Comments)     Gas       Current Outpatient Medications   Medication Sig Dispense Refill    ALPRAZolam (XANAX) 1 MG tablet Take 1 tablet (1 mg total) by mouth every evening. 30 tablet 2    atorvastatin (LIPITOR) 40 MG tablet Take 1 tablet (40 mg total) by mouth  once daily. 90 tablet 1    benazepril (LOTENSIN) 20 MG tablet Take 1 tablet (20 mg total) by mouth once daily. 90 tablet 1    calcium carbonate/vitamin D3 (CALTRATE 600 PLUS D ORAL) Caltrate 600 plus D      cephALEXin (KEFLEX) 500 MG capsule Take 1 capsule (500 mg total) by mouth every 8 (eight) hours. 21 capsule 0    estradiol (ESTRACE) 0.01 % (0.1 mg/gram) vaginal cream Place 1 g vaginally twice a week. 45 g 6    hydroCHLOROthiazide (HYDRODIURIL) 12.5 MG Tab Take 1 tablet (12.5 mg total) by mouth once daily. 90 tablet 1    HYDROcodone-acetaminophen (NORCO)  mg per tablet TK 1 T PO  BID PRN  0    lancets 33 gauge Misc 1 lancet by Misc.(Non-Drug; Combo Route) route 3 (three) times a week. 100 each 3    melatonin 5 mg Cap Take by mouth.      metFORMIN (GLUCOPHAGE) 500 MG tablet Take 1 tablet (500 mg total) by mouth 2 (two) times daily. 180 tablet 1    mupirocin (BACTROBAN) 2 % ointment Apply topically 3 (three) times daily. for 10 days 22 g 0    omega 3-dha-epa-fish oil (FISH OIL) 100-160-1,000 mg Cap Fish Oil      omeprazole (PRILOSEC) 20 MG capsule       traZODone (DESYREL) 50 MG tablet Take 1 tablet (50 mg total) by mouth every evening. 30 tablet 2     No current facility-administered medications for this visit.        Review of Systems   Constitutional: Negative for chills, fever and unexpected weight change.   Eyes: Negative for pain.   Respiratory: Negative for cough and shortness of breath.    Cardiovascular: Negative for chest pain, palpitations and leg swelling.   Musculoskeletal: Negative for arthralgias.   Skin: Positive for wound. Negative for rash.        Patient reports red area on right glute.  No drainage or pustule   Psychiatric/Behavioral: Negative for agitation and sleep disturbance. The patient is not nervous/anxious.          Objective:        Physical Exam:   Physical Exam   Constitutional: She appears well-developed and well-nourished.            Assessment:       1. Abscess     2. Essential hypertension    3. Primary insomnia      Plan:   Abscess  Comments:  Keep site clean and dry.  Apply Bactroban 3 times a day.  wamr compresses.  Keflex if symptoms worsen.  Orders:  -     cephALEXin (KEFLEX) 500 MG capsule; Take 1 capsule (500 mg total) by mouth every 8 (eight) hours.  Dispense: 21 capsule; Refill: 0  -     mupirocin (BACTROBAN) 2 % ointment; Apply topically 3 (three) times daily. for 10 days  Dispense: 22 g; Refill: 0    Essential hypertension    Primary insomnia      Follow up in about 3 months (around 7/2/2020) for medication management.    Total time spent with patient: 15min    Each patient to whom he or she provides medical services by telemedicine is:  (1) informed of the relationship between the physician and patient and the respective role of any other health care provider with respect to management of the patient; and (2) notified that he or she may decline to receive medical services by telemedicine and may withdraw from such care at any time.    This note was created using [employer] voice recognition software that occasionally misinterprets phrases or words.

## 2020-04-02 NOTE — TELEPHONE ENCOUNTER
Health Maintenance Due   Topic Date Due   • Influenza Vaccine (1 of 2) 09/01/2019   • MMR Vaccine (1 of 2 - Standard series) 12/10/2019   • Varicella Vaccine (1 of 2 - 2-dose childhood series) 12/10/2019   • HIB Vaccine (4 of 4 - Standard series) 12/10/2019   • Hepatitis A Vaccine (1 of 2 - 2-dose series) 12/10/2019   • Pneumococcal Vaccine 0-64 (4 of 4) 12/10/2019       Patient is due for topics as listed above but is not proceeding with Immunization(s) Influenza at this time. Declined.            Pt is downloading chika and will call back for VV

## 2020-06-02 ENCOUNTER — TELEPHONE (OUTPATIENT)
Dept: FAMILY MEDICINE | Facility: CLINIC | Age: 70
End: 2020-06-02

## 2020-06-02 NOTE — TELEPHONE ENCOUNTER
Spoke to pt regarding her appt on 6/10. Offered virtual visit. Pt stated she wants to keep her in office appt. Advised pt to wear mask

## 2020-06-10 ENCOUNTER — OFFICE VISIT (OUTPATIENT)
Dept: FAMILY MEDICINE | Facility: CLINIC | Age: 70
End: 2020-06-10
Payer: MEDICARE

## 2020-06-10 VITALS
TEMPERATURE: 98 F | BODY MASS INDEX: 26.87 KG/M2 | HEIGHT: 62 IN | SYSTOLIC BLOOD PRESSURE: 128 MMHG | DIASTOLIC BLOOD PRESSURE: 78 MMHG | WEIGHT: 146 LBS | HEART RATE: 68 BPM

## 2020-06-10 DIAGNOSIS — Z23 NEED FOR STREPTOCOCCUS PNEUMONIAE VACCINATION: ICD-10-CM

## 2020-06-10 DIAGNOSIS — E11.65 UNCONTROLLED TYPE 2 DIABETES MELLITUS WITH HYPERGLYCEMIA: Primary | ICD-10-CM

## 2020-06-10 DIAGNOSIS — Z79.899 HIGH RISK MEDICATION USE: ICD-10-CM

## 2020-06-10 DIAGNOSIS — F32.A DEPRESSION, UNSPECIFIED DEPRESSION TYPE: ICD-10-CM

## 2020-06-10 DIAGNOSIS — I10 HYPERTENSION, UNSPECIFIED TYPE: ICD-10-CM

## 2020-06-10 DIAGNOSIS — F41.9 ANXIETY: ICD-10-CM

## 2020-06-10 DIAGNOSIS — E78.5 HYPERLIPIDEMIA, UNSPECIFIED HYPERLIPIDEMIA TYPE: ICD-10-CM

## 2020-06-10 DIAGNOSIS — F51.01 PRIMARY INSOMNIA: ICD-10-CM

## 2020-06-10 DIAGNOSIS — M15.9 OSTEOARTHRITIS OF MULTIPLE JOINTS, UNSPECIFIED OSTEOARTHRITIS TYPE: ICD-10-CM

## 2020-06-10 LAB — HBA1C MFR BLD: 6.1 %

## 2020-06-10 PROCEDURE — 3078F DIAST BP <80 MM HG: CPT | Mod: S$GLB,,, | Performed by: NURSE PRACTITIONER

## 2020-06-10 PROCEDURE — 3078F PR MOST RECENT DIASTOLIC BLOOD PRESSURE < 80 MM HG: ICD-10-PCS | Mod: S$GLB,,, | Performed by: NURSE PRACTITIONER

## 2020-06-10 PROCEDURE — 90732 PNEUMOCOCCAL POLYSACCHARIDE VACCINE 23-VALENT =>2YO SQ IM: ICD-10-PCS | Mod: S$GLB,,, | Performed by: NURSE PRACTITIONER

## 2020-06-10 PROCEDURE — 83036 HEMOGLOBIN GLYCOSYLATED A1C: CPT | Mod: 59,QW,, | Performed by: NURSE PRACTITIONER

## 2020-06-10 PROCEDURE — 99214 OFFICE O/P EST MOD 30 MIN: CPT | Mod: 25,S$GLB,, | Performed by: NURSE PRACTITIONER

## 2020-06-10 PROCEDURE — 1101F PT FALLS ASSESS-DOCD LE1/YR: CPT | Mod: S$GLB,,, | Performed by: NURSE PRACTITIONER

## 2020-06-10 PROCEDURE — G0009 PNEUMOCOCCAL POLYSACCHARIDE VACCINE 23-VALENT =>2YO SQ IM: ICD-10-PCS | Mod: S$GLB,,, | Performed by: NURSE PRACTITIONER

## 2020-06-10 PROCEDURE — 3044F HG A1C LEVEL LT 7.0%: CPT | Mod: S$GLB,,, | Performed by: NURSE PRACTITIONER

## 2020-06-10 PROCEDURE — 3044F PR MOST RECENT HEMOGLOBIN A1C LEVEL <7.0%: ICD-10-PCS | Mod: S$GLB,,, | Performed by: NURSE PRACTITIONER

## 2020-06-10 PROCEDURE — 1159F MED LIST DOCD IN RCRD: CPT | Mod: S$GLB,,, | Performed by: NURSE PRACTITIONER

## 2020-06-10 PROCEDURE — 3074F SYST BP LT 130 MM HG: CPT | Mod: S$GLB,,, | Performed by: NURSE PRACTITIONER

## 2020-06-10 PROCEDURE — 1159F PR MEDICATION LIST DOCUMENTED IN MEDICAL RECORD: ICD-10-PCS | Mod: S$GLB,,, | Performed by: NURSE PRACTITIONER

## 2020-06-10 PROCEDURE — 99214 PR OFFICE/OUTPT VISIT, EST, LEVL IV, 30-39 MIN: ICD-10-PCS | Mod: 25,S$GLB,, | Performed by: NURSE PRACTITIONER

## 2020-06-10 PROCEDURE — 83036 POCT HEMOGLOBIN A1C: ICD-10-PCS | Mod: 59,QW,, | Performed by: NURSE PRACTITIONER

## 2020-06-10 PROCEDURE — 90732 PPSV23 VACC 2 YRS+ SUBQ/IM: CPT | Mod: S$GLB,,, | Performed by: NURSE PRACTITIONER

## 2020-06-10 PROCEDURE — 1101F PR PT FALLS ASSESS DOC 0-1 FALLS W/OUT INJ PAST YR: ICD-10-PCS | Mod: S$GLB,,, | Performed by: NURSE PRACTITIONER

## 2020-06-10 PROCEDURE — 3074F PR MOST RECENT SYSTOLIC BLOOD PRESSURE < 130 MM HG: ICD-10-PCS | Mod: S$GLB,,, | Performed by: NURSE PRACTITIONER

## 2020-06-10 PROCEDURE — G0009 ADMIN PNEUMOCOCCAL VACCINE: HCPCS | Mod: S$GLB,,, | Performed by: NURSE PRACTITIONER

## 2020-06-10 RX ORDER — MELOXICAM 15 MG/1
15 TABLET ORAL DAILY
Qty: 90 TABLET | Refills: 1 | Status: SHIPPED | OUTPATIENT
Start: 2020-06-10 | End: 2020-12-10 | Stop reason: SDUPTHER

## 2020-06-10 RX ORDER — MELOXICAM 15 MG/1
15 TABLET ORAL DAILY
COMMUNITY
End: 2020-06-10 | Stop reason: SDUPTHER

## 2020-06-10 RX ORDER — TEMAZEPAM 30 MG/1
30 CAPSULE ORAL NIGHTLY PRN
Qty: 30 CAPSULE | Refills: 2 | Status: SHIPPED | OUTPATIENT
Start: 2020-06-10 | End: 2020-07-10

## 2020-06-10 NOTE — PROGRESS NOTES
SUBJECTIVE:    Patient ID: Kay Valencia is a 69 y.o. female.    Chief Complaint: Follow-up (brought bottles// VM)    69-year-old female presents for check up.  Currently treated for diabetes hypertension hyperlipidemia anxiety and arthritis.  Reports that has not been watching diet as much as usual.  Check sugar daily.  Highest reading was 150.  Currently not sleeping well.  Reports has trouble falling asleep and staying asleep.  Currently taking Xanax and melatonin with little improvement of symptoms.  Due for labs.  Patient reports that she will have done today.      Office Visit on 06/10/2020   Component Date Value Ref Range Status    Hemoglobin A1C 06/10/2020 6.1  % Final       Past Medical History:   Diagnosis Date    Anxiety     Depression     Diabetes mellitus, type 2     GERD (gastroesophageal reflux disease)     Hyperlipidemia     Hypertension      Past Surgical History:   Procedure Laterality Date    HYSTERECTOMY      INCONTINENCE SURGERY       Family History   Problem Relation Age of Onset    Diabetes Maternal Grandmother     Hypertension Maternal Grandmother     Diabetes Father     Breast cancer Mother     Hypertension Mother     Diabetes Sister     Ovarian cancer Neg Hx        Marital Status:   Alcohol History:  reports that she does not drink alcohol.  Tobacco History:  reports that she has quit smoking. She has never used smokeless tobacco.  Drug History:  reports that she does not use drugs.    Review of patient's allergies indicates:   Allergen Reactions    Flexeril [cyclobenzaprine] Other (See Comments)     Unknown was a long time ago.    Sulfa (sulfonamide antibiotics) Nausea Only    Augmentin [amoxicillin-pot clavulanate] Other (See Comments)     Gas       Current Outpatient Medications:     ALPRAZolam (XANAX) 1 MG tablet, Take 1 tablet (1 mg total) by mouth every evening., Disp: 30 tablet, Rfl: 2    atorvastatin (LIPITOR) 40 MG tablet, Take 1 tablet (40 mg total)  by mouth once daily., Disp: 90 tablet, Rfl: 1    benazepril (LOTENSIN) 20 MG tablet, Take 1 tablet (20 mg total) by mouth once daily., Disp: 90 tablet, Rfl: 1    calcium carbonate/vitamin D3 (CALTRATE 600 PLUS D ORAL), Caltrate 600 plus D, Disp: , Rfl:     hydroCHLOROthiazide (HYDRODIURIL) 12.5 MG Tab, Take 1 tablet (12.5 mg total) by mouth once daily., Disp: 90 tablet, Rfl: 1    HYDROcodone-acetaminophen (NORCO)  mg per tablet, TK 1 T PO  BID PRN, Disp: , Rfl: 0    lancets 33 gauge Misc, 1 lancet by Misc.(Non-Drug; Combo Route) route 3 (three) times a week., Disp: 100 each, Rfl: 3    melatonin 5 mg Cap, Take by mouth., Disp: , Rfl:     meloxicam (MOBIC) 15 MG tablet, Take 1 tablet (15 mg total) by mouth once daily., Disp: 90 tablet, Rfl: 1    metFORMIN (GLUCOPHAGE) 500 MG tablet, Take 1 tablet (500 mg total) by mouth 2 (two) times daily., Disp: 180 tablet, Rfl: 1    omega 3-dha-epa-fish oil (FISH OIL) 100-160-1,000 mg Cap, Fish Oil, Disp: , Rfl:     omeprazole (PRILOSEC) 20 MG capsule, , Disp: , Rfl:     estradiol (ESTRACE) 0.01 % (0.1 mg/gram) vaginal cream, Place 1 g vaginally twice a week., Disp: 45 g, Rfl: 6    temazepam (RESTORIL) 30 mg capsule, Take 1 capsule (30 mg total) by mouth nightly as needed for Insomnia., Disp: 30 capsule, Rfl: 2    Review of Systems   Constitutional: Negative for chills, fever and unexpected weight change.   HENT: Negative for ear pain, rhinorrhea and sore throat.    Eyes: Negative for pain and visual disturbance.   Respiratory: Negative for cough and shortness of breath.    Cardiovascular: Negative for chest pain, palpitations and leg swelling.   Gastrointestinal: Negative for abdominal pain, diarrhea, nausea and vomiting.   Genitourinary: Negative for difficulty urinating, hematuria and vaginal bleeding.   Musculoskeletal: Negative for arthralgias.   Skin: Negative for rash.   Neurological: Negative for dizziness, weakness and headaches.   Psychiatric/Behavioral:  "Negative for agitation and sleep disturbance. The patient is not nervous/anxious.           Objective:      Vitals:    06/10/20 1119   BP: 128/78   Pulse: 68   Temp: 98 °F (36.7 °C)   TempSrc: Oral   Weight: 66.2 kg (146 lb)   Height: 5' 2" (1.575 m)     Body mass index is 26.7 kg/m².  Physical Exam   Constitutional: She is oriented to person, place, and time. She appears well-developed and well-nourished.   HENT:   Right Ear: External ear normal.   Left Ear: External ear normal.   Mouth/Throat: Oropharynx is clear and moist.   Eyes: Conjunctivae are normal.   Neck: Normal range of motion. Neck supple. No JVD present.   Cardiovascular: Normal rate and regular rhythm.   No murmur heard.  Pulmonary/Chest: Effort normal and breath sounds normal.   Abdominal: Soft. Bowel sounds are normal.   Musculoskeletal: Normal range of motion. She exhibits no edema or deformity.   Feet:   Right Foot:   Protective Sensation: 5 sites tested. 5 sites sensed.   Left Foot:   Protective Sensation: 5 sites tested. 5 sites sensed.   Lymphadenopathy:     She has no cervical adenopathy.   Neurological: She is alert and oriented to person, place, and time. Gait normal.   Skin: Skin is warm, dry and intact. No rash noted.   Psychiatric: She has a normal mood and affect. Her speech is normal and behavior is normal.         Assessment:       1. Uncontrolled type 2 diabetes mellitus with hyperglycemia    2. Primary insomnia    3. Osteoarthritis of multiple joints, unspecified osteoarthritis type    4. Hypertension, unspecified type    5. Depression, unspecified depression type    6. Anxiety    7. Hyperlipidemia, unspecified hyperlipidemia type    8. High risk medication use         Plan:       Uncontrolled type 2 diabetes mellitus with hyperglycemia  -     Hemoglobin A1C, POCT    Primary insomnia  Comments:  Try Restoril for sleep.  Do not take with Xanax.    Osteoarthritis of multiple joints, unspecified osteoarthritis type    Hypertension, " unspecified type    Depression, unspecified depression type    Anxiety    Hyperlipidemia, unspecified hyperlipidemia type  -     Lipid Panel; Future; Expected date: 06/10/2020  -     Comprehensive metabolic panel; Future; Expected date: 06/10/2020    High risk medication use  -     Lipid Panel; Future; Expected date: 06/10/2020  -     Comprehensive metabolic panel; Future; Expected date: 06/10/2020    Other orders  -     meloxicam (MOBIC) 15 MG tablet; Take 1 tablet (15 mg total) by mouth once daily.  Dispense: 90 tablet; Refill: 1  -     temazepam (RESTORIL) 30 mg capsule; Take 1 capsule (30 mg total) by mouth nightly as needed for Insomnia.  Dispense: 30 capsule; Refill: 2      Follow up in about 3 months (around 9/10/2020) for medication management.

## 2020-06-11 LAB
ALBUMIN SERPL-MCNC: 4.6 G/DL (ref 3.6–5.1)
ALBUMIN/GLOB SERPL: 2.1 (CALC) (ref 1–2.5)
ALP SERPL-CCNC: 65 U/L (ref 37–153)
ALT SERPL-CCNC: 24 U/L (ref 6–29)
AST SERPL-CCNC: 18 U/L (ref 10–35)
BILIRUB SERPL-MCNC: 0.8 MG/DL (ref 0.2–1.2)
BUN SERPL-MCNC: 24 MG/DL (ref 7–25)
BUN/CREAT SERPL: ABNORMAL (CALC) (ref 6–22)
CALCIUM SERPL-MCNC: 10.2 MG/DL (ref 8.6–10.4)
CHLORIDE SERPL-SCNC: 103 MMOL/L (ref 98–110)
CHOLEST SERPL-MCNC: 206 MG/DL
CHOLEST/HDLC SERPL: 3.8 (CALC)
CO2 SERPL-SCNC: 28 MMOL/L (ref 20–32)
CREAT SERPL-MCNC: 0.82 MG/DL (ref 0.5–0.99)
GFRSERPLBLD MDRD-ARVRAT: 74 ML/MIN/1.73M2
GLOBULIN SER CALC-MCNC: 2.2 G/DL (CALC) (ref 1.9–3.7)
GLUCOSE SERPL-MCNC: 115 MG/DL (ref 65–99)
HDLC SERPL-MCNC: 54 MG/DL
LDLC SERPL CALC-MCNC: 111 MG/DL (CALC)
NONHDLC SERPL-MCNC: 152 MG/DL (CALC)
POTASSIUM SERPL-SCNC: 3.9 MMOL/L (ref 3.5–5.3)
PROT SERPL-MCNC: 6.8 G/DL (ref 6.1–8.1)
SODIUM SERPL-SCNC: 140 MMOL/L (ref 135–146)
TRIGL SERPL-MCNC: 284 MG/DL

## 2020-07-06 DIAGNOSIS — F41.9 ANXIETY: ICD-10-CM

## 2020-07-06 RX ORDER — ALPRAZOLAM 1 MG/1
1 TABLET ORAL NIGHTLY
Qty: 30 TABLET | Refills: 2 | Status: SHIPPED | OUTPATIENT
Start: 2020-07-06 | End: 2020-10-30 | Stop reason: SDUPTHER

## 2020-07-06 NOTE — TELEPHONE ENCOUNTER
----- Message from Zuhair Morgan sent at 7/6/2020 10:55 AM CDT -----  Regarding: refills  Xanax   Pharm [facility] [id number]   Pt [phone]

## 2020-09-08 ENCOUNTER — TELEPHONE (OUTPATIENT)
Dept: FAMILY MEDICINE | Facility: CLINIC | Age: 70
End: 2020-09-08

## 2020-09-08 NOTE — TELEPHONE ENCOUNTER
----- Message from Faiza Graec sent at 9/8/2020  8:43 AM CDT -----  Regarding: Returning nurse call  Contact: Kay Valencia  Pt says that for her appt this week, she will come in the office not virtual.

## 2020-09-10 ENCOUNTER — OFFICE VISIT (OUTPATIENT)
Dept: FAMILY MEDICINE | Facility: CLINIC | Age: 70
End: 2020-09-10
Payer: MEDICARE

## 2020-09-10 VITALS
WEIGHT: 147 LBS | BODY MASS INDEX: 27.05 KG/M2 | SYSTOLIC BLOOD PRESSURE: 134 MMHG | TEMPERATURE: 98 F | DIASTOLIC BLOOD PRESSURE: 78 MMHG | HEIGHT: 62 IN | HEART RATE: 70 BPM

## 2020-09-10 DIAGNOSIS — M51.36 DDD (DEGENERATIVE DISC DISEASE), LUMBAR: ICD-10-CM

## 2020-09-10 DIAGNOSIS — E11.65 UNCONTROLLED TYPE 2 DIABETES MELLITUS WITH HYPERGLYCEMIA: ICD-10-CM

## 2020-09-10 DIAGNOSIS — I10 HYPERTENSION, UNSPECIFIED TYPE: ICD-10-CM

## 2020-09-10 DIAGNOSIS — E78.5 HYPERLIPIDEMIA, UNSPECIFIED HYPERLIPIDEMIA TYPE: ICD-10-CM

## 2020-09-10 DIAGNOSIS — M15.9 OSTEOARTHRITIS OF MULTIPLE JOINTS, UNSPECIFIED OSTEOARTHRITIS TYPE: ICD-10-CM

## 2020-09-10 DIAGNOSIS — F51.01 PRIMARY INSOMNIA: ICD-10-CM

## 2020-09-10 DIAGNOSIS — F41.9 ANXIETY: ICD-10-CM

## 2020-09-10 DIAGNOSIS — Z79.899 HIGH RISK MEDICATION USE: Primary | ICD-10-CM

## 2020-09-10 DIAGNOSIS — J32.9 SINUSITIS, UNSPECIFIED CHRONICITY, UNSPECIFIED LOCATION: ICD-10-CM

## 2020-09-10 LAB — HBA1C MFR BLD: 6.5 %

## 2020-09-10 PROCEDURE — 3044F HG A1C LEVEL LT 7.0%: CPT | Mod: S$GLB,,, | Performed by: NURSE PRACTITIONER

## 2020-09-10 PROCEDURE — 3008F BODY MASS INDEX DOCD: CPT | Mod: S$GLB,,, | Performed by: NURSE PRACTITIONER

## 2020-09-10 PROCEDURE — 3044F PR MOST RECENT HEMOGLOBIN A1C LEVEL <7.0%: ICD-10-PCS | Mod: S$GLB,,, | Performed by: NURSE PRACTITIONER

## 2020-09-10 PROCEDURE — 3075F PR MOST RECENT SYSTOLIC BLOOD PRESS GE 130-139MM HG: ICD-10-PCS | Mod: S$GLB,,, | Performed by: NURSE PRACTITIONER

## 2020-09-10 PROCEDURE — 1101F PT FALLS ASSESS-DOCD LE1/YR: CPT | Mod: S$GLB,,, | Performed by: NURSE PRACTITIONER

## 2020-09-10 PROCEDURE — 1159F PR MEDICATION LIST DOCUMENTED IN MEDICAL RECORD: ICD-10-PCS | Mod: S$GLB,,, | Performed by: NURSE PRACTITIONER

## 2020-09-10 PROCEDURE — 99214 OFFICE O/P EST MOD 30 MIN: CPT | Mod: S$GLB,,, | Performed by: NURSE PRACTITIONER

## 2020-09-10 PROCEDURE — 83036 POCT HEMOGLOBIN A1C: ICD-10-PCS | Mod: QW,,, | Performed by: NURSE PRACTITIONER

## 2020-09-10 PROCEDURE — 99214 PR OFFICE/OUTPT VISIT, EST, LEVL IV, 30-39 MIN: ICD-10-PCS | Mod: S$GLB,,, | Performed by: NURSE PRACTITIONER

## 2020-09-10 PROCEDURE — 1159F MED LIST DOCD IN RCRD: CPT | Mod: S$GLB,,, | Performed by: NURSE PRACTITIONER

## 2020-09-10 PROCEDURE — 83036 HEMOGLOBIN GLYCOSYLATED A1C: CPT | Mod: QW,,, | Performed by: NURSE PRACTITIONER

## 2020-09-10 PROCEDURE — 3008F PR BODY MASS INDEX (BMI) DOCUMENTED: ICD-10-PCS | Mod: S$GLB,,, | Performed by: NURSE PRACTITIONER

## 2020-09-10 PROCEDURE — 1101F PR PT FALLS ASSESS DOC 0-1 FALLS W/OUT INJ PAST YR: ICD-10-PCS | Mod: S$GLB,,, | Performed by: NURSE PRACTITIONER

## 2020-09-10 PROCEDURE — 3075F SYST BP GE 130 - 139MM HG: CPT | Mod: S$GLB,,, | Performed by: NURSE PRACTITIONER

## 2020-09-10 PROCEDURE — 3078F DIAST BP <80 MM HG: CPT | Mod: S$GLB,,, | Performed by: NURSE PRACTITIONER

## 2020-09-10 PROCEDURE — 3078F PR MOST RECENT DIASTOLIC BLOOD PRESSURE < 80 MM HG: ICD-10-PCS | Mod: S$GLB,,, | Performed by: NURSE PRACTITIONER

## 2020-09-10 RX ORDER — TEMAZEPAM 30 MG/1
CAPSULE ORAL
COMMUNITY
Start: 2020-07-17 | End: 2020-12-07 | Stop reason: SDUPTHER

## 2020-09-10 RX ORDER — MINERAL OIL
180 ENEMA (ML) RECTAL DAILY
Qty: 30 TABLET | Refills: 0 | Status: SHIPPED | OUTPATIENT
Start: 2020-09-10 | End: 2020-12-10

## 2020-09-10 RX ORDER — METFORMIN HYDROCHLORIDE 500 MG/1
500 TABLET ORAL 2 TIMES DAILY
Qty: 180 TABLET | Refills: 1 | Status: SHIPPED | OUTPATIENT
Start: 2020-09-10 | End: 2020-12-10 | Stop reason: SDUPTHER

## 2020-09-10 RX ORDER — INSULIN PUMP SYRINGE, 3 ML
EACH MISCELLANEOUS
Qty: 1 EACH | Refills: 1 | Status: SHIPPED | OUTPATIENT
Start: 2020-09-10 | End: 2021-08-09

## 2020-09-10 RX ORDER — BENAZEPRIL HYDROCHLORIDE 20 MG/1
20 TABLET ORAL DAILY
Qty: 90 TABLET | Refills: 1 | Status: SHIPPED | OUTPATIENT
Start: 2020-09-10 | End: 2020-12-10 | Stop reason: SDUPTHER

## 2020-09-10 RX ORDER — ATORVASTATIN CALCIUM 40 MG/1
40 TABLET, FILM COATED ORAL DAILY
Qty: 90 TABLET | Refills: 1 | Status: SHIPPED | OUTPATIENT
Start: 2020-09-10 | End: 2020-12-10 | Stop reason: SDUPTHER

## 2020-09-10 RX ORDER — AZITHROMYCIN 250 MG/1
TABLET, FILM COATED ORAL
Qty: 6 TABLET | Refills: 0 | Status: SHIPPED | OUTPATIENT
Start: 2020-09-10 | End: 2020-09-15

## 2020-09-10 NOTE — PROGRESS NOTES
SUBJECTIVE:    Patient ID: Kay Valencia is a 70 y.o. female.    Chief Complaint: Follow-up ((brought bottles))    70-year-old female presents for check up.  Currently treated for diabetes hypertension hyperlipidemia anxiety and arthritis.  Reports that has not been watching diet as much as usual.  Check sugar daily.  Snacking and not as active.  Currently sleeping better. Still some family stressors. Has been having trouble with sinus congestion. No fever. Post nasal drip. Facial pressure.       Office Visit on 09/10/2020   Component Date Value Ref Range Status    WBC 09/10/2020 4.8  3.8 - 10.8 Thousand/uL Final    RBC 09/10/2020 4.09  3.80 - 5.10 Million/uL Final    Hemoglobin 09/10/2020 12.3  11.7 - 15.5 g/dL Final    Hematocrit 09/10/2020 37.6  35.0 - 45.0 % Final    Mean Corpuscular Volume 09/10/2020 91.9  80.0 - 100.0 fL Final    Mean Corpuscular Hemoglobin 09/10/2020 30.1  27.0 - 33.0 pg Final    Mean Corpuscular Hemoglobin Conc 09/10/2020 32.7  32.0 - 36.0 g/dL Final    RDW 09/10/2020 12.9  11.0 - 15.0 % Final    Platelets 09/10/2020 140  140 - 400 Thousand/uL Final    MPV 09/10/2020 11.1  7.5 - 12.5 fL Final    Neutrophils Absolute 09/10/2020 2,966  1,500 - 7,800 cells/uL Final    Lymph # 09/10/2020 1,056  850 - 3,900 cells/uL Final    Mono # 09/10/2020 437  200 - 950 cells/uL Final    Eos # 09/10/2020 269  15 - 500 cells/uL Final    Baso # 09/10/2020 72  0 - 200 cells/uL Final    Neutrophils Relative 09/10/2020 61.8  % Final    Lymph% 09/10/2020 22.0  % Final    Mono% 09/10/2020 9.1  % Final    Eosinophil% 09/10/2020 5.6  % Final    Basophil% 09/10/2020 1.5  % Final    Glucose 09/10/2020 113* 65 - 99 mg/dL Final    BUN, Bld 09/10/2020 27* 7 - 25 mg/dL Final    Creatinine 09/10/2020 0.89  0.50 - 0.99 mg/dL Final    eGFR if non African American 09/10/2020 66  > OR = 60 mL/min/1.73m2 Final    eGFR if African American 09/10/2020 77  > OR = 60 mL/min/1.73m2 Final    BUN/Creatinine  Ratio 09/10/2020 30* 6 - 22 (calc) Final    Sodium 09/10/2020 140  135 - 146 mmol/L Final    Potassium 09/10/2020 3.8  3.5 - 5.3 mmol/L Final    Chloride 09/10/2020 101  98 - 110 mmol/L Final    CO2 09/10/2020 28  20 - 32 mmol/L Final    Calcium 09/10/2020 9.7  8.6 - 10.4 mg/dL Final    Total Protein 09/10/2020 6.8  6.1 - 8.1 g/dL Final    Albumin 09/10/2020 4.6  3.6 - 5.1 g/dL Final    Globulin, Total 09/10/2020 2.2  1.9 - 3.7 g/dL (calc) Final    Albumin/Globulin Ratio 09/10/2020 2.1  1.0 - 2.5 (calc) Final    Total Bilirubin 09/10/2020 0.8  0.2 - 1.2 mg/dL Final    Alkaline Phosphatase 09/10/2020 73  37 - 153 U/L Final    AST 09/10/2020 18  10 - 35 U/L Final    ALT 09/10/2020 29  6 - 29 U/L Final    Cholesterol 09/10/2020 219* <200 mg/dL Final    HDL 09/10/2020 51  > OR = 50 mg/dL Final    Triglycerides 09/10/2020 257* <150 mg/dL Final    LDL Cholesterol 09/10/2020 128* mg/dL (calc) Final    Hdl/Cholesterol Ratio 09/10/2020 4.3  <5.0 (calc) Final    Non HDL Chol. (LDL+VLDL) 09/10/2020 168* <130 mg/dL (calc) Final    TSH w/reflex to FT4 09/10/2020 0.54  0.40 - 4.50 mIU/L Final    Creatinine, Random Ur 09/10/2020 53  20 - 275 mg/dL Final    Microalb, Ur 09/10/2020 0.9  See Note: mg/dL Final    Microalb Creat Ratio 09/10/2020 17  <30 mcg/mg creat Final    Color, UA 09/10/2020 YELLOW  YELLOW Final    Appearance, UA 09/10/2020 CLEAR  CLEAR Final    Specific [place], UA 09/10/2020 1.013  1.001 - 1.035 Final    pH, UA 09/10/2020 6.5  5.0 - 8.0 Final    Glucose, UA 09/10/2020 NEGATIVE  NEGATIVE Final    Bilirubin, UA 09/10/2020 NEGATIVE  NEGATIVE Final    Ketones, UA 09/10/2020 NEGATIVE  NEGATIVE Final    Occult Blood UA 09/10/2020 NEGATIVE  NEGATIVE Final    Protein, UA 09/10/2020 NEGATIVE  NEGATIVE Final    Nitrite, UA 09/10/2020 NEGATIVE  NEGATIVE Final    Leukocytes, UA 09/10/2020 TRACE* NEGATIVE Final    WBC Casts, UA 09/10/2020 NONE SEEN  < OR = 5 /HPF Final    RBC Casts, UA  09/10/2020 NONE SEEN  < OR = 2 /HPF Final    Squam Epithel, UA 09/10/2020 NONE SEEN  < OR = 5 /HPF Final    Bacteria, UA 09/10/2020 NONE SEEN  NONE SEEN /HPF Final    Hyaline Casts, UA 09/10/2020 NONE SEEN  NONE SEEN /LPF Final    Reflexive Urine Culture 09/10/2020 CULTURE INDICATED - RESULTS TO FOLLOW   Final    Urine Culture, Routine 09/10/2020    Final   Office Visit on 06/10/2020   Component Date Value Ref Range Status    Hemoglobin A1C 06/10/2020 6.1  % Final    Cholesterol 06/10/2020 206* <200 mg/dL Final    HDL 06/10/2020 54  > OR = 50 mg/dL Final    Triglycerides 06/10/2020 284* <150 mg/dL Final    LDL Cholesterol 06/10/2020 111* mg/dL (calc) Final    Hdl/Cholesterol Ratio 06/10/2020 3.8  <5.0 (calc) Final    Non HDL Chol. (LDL+VLDL) 06/10/2020 152* <130 mg/dL (calc) Final    Glucose 06/10/2020 115* 65 - 99 mg/dL Final    BUN, Bld 06/10/2020 24  7 - 25 mg/dL Final    Creatinine 06/10/2020 0.82  0.50 - 0.99 mg/dL Final    eGFR if non African American 06/10/2020 74  > OR = 60 mL/min/1.73m2 Final    eGFR if African American 06/10/2020 85  > OR = 60 mL/min/1.73m2 Final    BUN/Creatinine Ratio 06/10/2020 NOT APPLICABLE  6 - 22 (calc) Final    Sodium 06/10/2020 140  135 - 146 mmol/L Final    Potassium 06/10/2020 3.9  3.5 - 5.3 mmol/L Final    Chloride 06/10/2020 103  98 - 110 mmol/L Final    CO2 06/10/2020 28  20 - 32 mmol/L Final    Calcium 06/10/2020 10.2  8.6 - 10.4 mg/dL Final    Total Protein 06/10/2020 6.8  6.1 - 8.1 g/dL Final    Albumin 06/10/2020 4.6  3.6 - 5.1 g/dL Final    Globulin, Total 06/10/2020 2.2  1.9 - 3.7 g/dL (calc) Final    Albumin/Globulin Ratio 06/10/2020 2.1  1.0 - 2.5 (calc) Final    Total Bilirubin 06/10/2020 0.8  0.2 - 1.2 mg/dL Final    Alkaline Phosphatase 06/10/2020 65  37 - 153 U/L Final    AST 06/10/2020 18  10 - 35 U/L Final    ALT 06/10/2020 24  6 - 29 U/L Final       Past Medical History:   Diagnosis Date    Anxiety     Depression     Diabetes  mellitus, type 2     GERD (gastroesophageal reflux disease)     Hyperlipidemia     Hypertension      Social History     Socioeconomic History    Marital status:      Spouse name: Not on file    Number of children: Not on file    Years of education: Not on file    Highest education level: Not on file   Occupational History    Not on file   Social Needs    Financial resource strain: Not very hard    Food insecurity     Worry: Not on file     Inability: Not on file    Transportation needs     Medical: No     Non-medical: No   Tobacco Use    Smoking status: Former Smoker    Smokeless tobacco: Never Used   Substance and Sexual Activity    Alcohol use: No     Frequency: Patient refused     Drinks per session: Patient refused     Binge frequency: Never    Drug use: No    Sexual activity: Yes   Lifestyle    Physical activity     Days per week: Not on file     Minutes per session: Not on file    Stress: To some extent   Relationships    Social connections     Talks on phone: More than three times a week     Gets together: Twice a week     Attends Gnosticist service: Not on file     Active member of club or organization: No     Attends meetings of clubs or organizations: Never     Relationship status:    Other Topics Concern    Not on file   Social History Narrative    Not on file     Past Surgical History:   Procedure Laterality Date    HYSTERECTOMY      INCONTINENCE SURGERY       Family History   Problem Relation Age of Onset    Diabetes Maternal Grandmother     Hypertension Maternal Grandmother     Diabetes Father     Breast cancer Mother     Hypertension Mother     Diabetes Sister     Ovarian cancer Neg Hx        Review of patient's allergies indicates:   Allergen Reactions    Flexeril [cyclobenzaprine] Other (See Comments)     Unknown was a long time ago.    Sulfa (sulfonamide antibiotics) Nausea Only    Augmentin [amoxicillin-pot clavulanate] Other (See Comments)     Gas        Current Outpatient Medications:     ALPRAZolam (XANAX) 1 MG tablet, Take 1 tablet (1 mg total) by mouth every evening., Disp: 30 tablet, Rfl: 2    atorvastatin (LIPITOR) 40 MG tablet, Take 1 tablet (40 mg total) by mouth once daily., Disp: 90 tablet, Rfl: 1    benazepriL (LOTENSIN) 20 MG tablet, Take 1 tablet (20 mg total) by mouth once daily., Disp: 90 tablet, Rfl: 1    hydroCHLOROthiazide (HYDRODIURIL) 12.5 MG Tab, Take 1 tablet (12.5 mg total) by mouth once daily., Disp: 90 tablet, Rfl: 1    meloxicam (MOBIC) 15 MG tablet, Take 1 tablet (15 mg total) by mouth once daily., Disp: 90 tablet, Rfl: 1    metFORMIN (GLUCOPHAGE) 500 MG tablet, Take 1 tablet (500 mg total) by mouth 2 (two) times daily., Disp: 180 tablet, Rfl: 1    omeprazole (PRILOSEC) 20 MG capsule, Take 20 mg by mouth 2 (two) times a day. , Disp: , Rfl:     temazepam (RESTORIL) 30 mg capsule, TK ONE C PO QHS PRF INSOMNIA, Disp: , Rfl:     azithromycin (Z-WES) 250 MG tablet, Take 2 tablets by mouth on day 1; Take 1 tablet by mouth on days 2-5, Disp: 6 tablet, Rfl: 0    blood-glucose meter kit, Use as instructed, Disp: 1 each, Rfl: 1    calcium carbonate/vitamin D3 (CALTRATE 600 PLUS D ORAL), Caltrate 600 plus D, Disp: , Rfl:     estradiol (ESTRACE) 0.01 % (0.1 mg/gram) vaginal cream, Place 1 g vaginally twice a week., Disp: 45 g, Rfl: 6    fexofenadine (ALLEGRA) 180 MG tablet, Take 1 tablet (180 mg total) by mouth once daily., Disp: 30 tablet, Rfl: 0    HYDROcodone-acetaminophen (NORCO)  mg per tablet, TK 1 T PO  BID PRN, Disp: , Rfl: 0    lancets 33 gauge Misc, 1 lancet by Misc.(Non-Drug; Combo Route) route 3 (three) times a week., Disp: 100 each, Rfl: 3    melatonin 5 mg Cap, Take by mouth., Disp: , Rfl:     omega 3-dha-epa-fish oil (FISH OIL) 100-160-1,000 mg Cap, Fish Oil, Disp: , Rfl:     Review of Systems   Constitutional: Negative for chills, fever and unexpected weight change.   HENT: Positive for postnasal drip,  "sinus pressure and sinus pain. Negative for ear pain, rhinorrhea and sore throat.    Eyes: Negative for pain and visual disturbance.   Respiratory: Negative for cough and shortness of breath.    Cardiovascular: Negative for chest pain, palpitations and leg swelling.   Gastrointestinal: Negative for abdominal pain, diarrhea, nausea and vomiting.   Genitourinary: Negative for difficulty urinating, hematuria and vaginal bleeding.   Musculoskeletal: Negative for arthralgias.   Skin: Negative for rash.   Neurological: Negative for dizziness, weakness and headaches.   Psychiatric/Behavioral: Negative for agitation and sleep disturbance. The patient is not nervous/anxious.           Objective:      Vitals:    09/10/20 1103   BP: 134/78   Pulse: 70   Temp: 98.2 °F (36.8 °C)   Weight: 66.7 kg (147 lb)   Height: 5' 2" (1.575 m)     Physical Exam  Constitutional:       Appearance: She is well-developed.   HENT:      Right Ear: External ear normal.      Left Ear: External ear normal.      Nose:      Right Sinus: Frontal sinus tenderness present.      Left Sinus: Frontal sinus tenderness present.   Eyes:      Pupils: Pupils are equal, round, and reactive to light.   Neck:      Musculoskeletal: Normal range of motion and neck supple.   Cardiovascular:      Rate and Rhythm: Normal rate and regular rhythm.      Pulses:           Dorsalis pedis pulses are 2+ on the right side.        Posterior tibial pulses are 2+ on the right side.      Heart sounds: Normal heart sounds.   Pulmonary:      Effort: Pulmonary effort is normal.      Breath sounds: Normal breath sounds.   Abdominal:      General: Bowel sounds are normal.      Palpations: Abdomen is soft.   Musculoskeletal: Normal range of motion.         General: No deformity.      Right foot: Normal range of motion. No deformity.   Feet:      Right foot:      Protective Sensation: 5 sites tested. 5 sites sensed.      Left foot:      Protective Sensation: 5 sites tested. 5 sites sensed. "      Skin integrity: No skin breakdown.   Lymphadenopathy:      Cervical: No cervical adenopathy.   Skin:     General: Skin is warm and dry.   Neurological:      Mental Status: She is alert and oriented to person, place, and time.   Psychiatric:         Behavior: Behavior normal.           Assessment:       1. High risk medication use    2. Uncontrolled type 2 diabetes mellitus with hyperglycemia    3. Hypertension, unspecified type    4. Hyperlipidemia, unspecified hyperlipidemia type    5. Anxiety    6. Sinusitis, unspecified chronicity, unspecified location    7. Primary insomnia    8. Osteoarthritis of multiple joints, unspecified osteoarthritis type    9. DDD (degenerative disc disease), lumbar         Plan:       High risk medication use  -     CBC auto differential; Future; Expected date: 09/10/2020  -     Comprehensive metabolic panel; Future; Expected date: 09/10/2020  -     Lipid Panel; Future; Expected date: 09/10/2020  -     TSH w/reflex to FT4; Future; Expected date: 09/10/2020  -     Microalbumin/creatinine urine ratio; Future; Expected date: 09/10/2020  -     Urinalysis, Reflex to Urine Culture Urine, Clean Catch; Future; Expected date: 09/10/2020    Uncontrolled type 2 diabetes mellitus with hyperglycemia  Comments:  start diet. increase activity  Orders:  -     metFORMIN (GLUCOPHAGE) 500 MG tablet; Take 1 tablet (500 mg total) by mouth 2 (two) times daily.  Dispense: 180 tablet; Refill: 1  -     blood-glucose meter kit; Use as instructed  Dispense: 1 each; Refill: 1  -     Microalbumin/creatinine urine ratio; Future; Expected date: 09/10/2020  -     Hemoglobin A1C, POCT    Hypertension, unspecified type  Comments:  stable  Orders:  -     benazepriL (LOTENSIN) 20 MG tablet; Take 1 tablet (20 mg total) by mouth once daily.  Dispense: 90 tablet; Refill: 1  -     CBC auto differential; Future; Expected date: 09/10/2020  -     Comprehensive metabolic panel; Future; Expected date:  09/10/2020    Hyperlipidemia, unspecified hyperlipidemia type  -     atorvastatin (LIPITOR) 40 MG tablet; Take 1 tablet (40 mg total) by mouth once daily.  Dispense: 90 tablet; Refill: 1  -     Lipid Panel; Future; Expected date: 09/10/2020    Anxiety  -     CBC auto differential; Future; Expected date: 09/10/2020  -     Comprehensive metabolic panel; Future; Expected date: 09/10/2020  -     Lipid Panel; Future; Expected date: 09/10/2020  -     TSH w/reflex to FT4; Future; Expected date: 09/10/2020  -     Microalbumin/creatinine urine ratio; Future; Expected date: 09/10/2020  -     Urinalysis, Reflex to Urine Culture Urine, Clean Catch; Future; Expected date: 09/10/2020    Sinusitis, unspecified chronicity, unspecified location  -     azithromycin (Z-WES) 250 MG tablet; Take 2 tablets by mouth on day 1; Take 1 tablet by mouth on days 2-5  Dispense: 6 tablet; Refill: 0  -     fexofenadine (ALLEGRA) 180 MG tablet; Take 1 tablet (180 mg total) by mouth once daily.  Dispense: 30 tablet; Refill: 0    Primary insomnia    Osteoarthritis of multiple joints, unspecified osteoarthritis type    DDD (degenerative disc disease), lumbar    Other orders  -     Urine culture      Follow up in about 3 months (around 12/10/2020) for medication management, Diabetic Check-Up.        9/15/2020 Karolina Ramirez

## 2020-09-11 LAB
ALBUMIN SERPL-MCNC: 4.6 G/DL (ref 3.6–5.1)
ALBUMIN/GLOB SERPL: 2.1 (CALC) (ref 1–2.5)
ALP SERPL-CCNC: 73 U/L (ref 37–153)
ALT SERPL-CCNC: 29 U/L (ref 6–29)
AST SERPL-CCNC: 18 U/L (ref 10–35)
BASOPHILS # BLD AUTO: 72 CELLS/UL (ref 0–200)
BASOPHILS NFR BLD AUTO: 1.5 %
BILIRUB SERPL-MCNC: 0.8 MG/DL (ref 0.2–1.2)
BUN SERPL-MCNC: 27 MG/DL (ref 7–25)
BUN/CREAT SERPL: 30 (CALC) (ref 6–22)
CALCIUM SERPL-MCNC: 9.7 MG/DL (ref 8.6–10.4)
CHLORIDE SERPL-SCNC: 101 MMOL/L (ref 98–110)
CHOLEST SERPL-MCNC: 219 MG/DL
CHOLEST/HDLC SERPL: 4.3 (CALC)
CO2 SERPL-SCNC: 28 MMOL/L (ref 20–32)
CREAT SERPL-MCNC: 0.89 MG/DL (ref 0.5–0.99)
EOSINOPHIL # BLD AUTO: 269 CELLS/UL (ref 15–500)
EOSINOPHIL NFR BLD AUTO: 5.6 %
ERYTHROCYTE [DISTWIDTH] IN BLOOD BY AUTOMATED COUNT: 12.9 % (ref 11–15)
GFRSERPLBLD MDRD-ARVRAT: 66 ML/MIN/1.73M2
GLOBULIN SER CALC-MCNC: 2.2 G/DL (CALC) (ref 1.9–3.7)
GLUCOSE SERPL-MCNC: 113 MG/DL (ref 65–99)
HCT VFR BLD AUTO: 37.6 % (ref 35–45)
HDLC SERPL-MCNC: 51 MG/DL
HGB BLD-MCNC: 12.3 G/DL (ref 11.7–15.5)
LDLC SERPL CALC-MCNC: 128 MG/DL (CALC)
LYMPHOCYTES # BLD AUTO: 1056 CELLS/UL (ref 850–3900)
LYMPHOCYTES NFR BLD AUTO: 22 %
MCH RBC QN AUTO: 30.1 PG (ref 27–33)
MCHC RBC AUTO-ENTMCNC: 32.7 G/DL (ref 32–36)
MCV RBC AUTO: 91.9 FL (ref 80–100)
MONOCYTES # BLD AUTO: 437 CELLS/UL (ref 200–950)
MONOCYTES NFR BLD AUTO: 9.1 %
NEUTROPHILS # BLD AUTO: 2966 CELLS/UL (ref 1500–7800)
NEUTROPHILS NFR BLD AUTO: 61.8 %
NONHDLC SERPL-MCNC: 168 MG/DL (CALC)
PLATELET # BLD AUTO: 140 THOUSAND/UL (ref 140–400)
PMV BLD REES-ECKER: 11.1 FL (ref 7.5–12.5)
POTASSIUM SERPL-SCNC: 3.8 MMOL/L (ref 3.5–5.3)
PROT SERPL-MCNC: 6.8 G/DL (ref 6.1–8.1)
RBC # BLD AUTO: 4.09 MILLION/UL (ref 3.8–5.1)
SODIUM SERPL-SCNC: 140 MMOL/L (ref 135–146)
TRIGL SERPL-MCNC: 257 MG/DL
TSH SERPL-ACNC: 0.54 MIU/L (ref 0.4–4.5)
WBC # BLD AUTO: 4.8 THOUSAND/UL (ref 3.8–10.8)

## 2020-09-12 LAB
ALBUMIN/CREAT UR: 17 MCG/MG CREAT
APPEARANCE UR: CLEAR
BACTERIA #/AREA URNS HPF: ABNORMAL /HPF
BACTERIA UR CULT: ABNORMAL
BACTERIA UR CULT: NORMAL
BILIRUB UR QL STRIP: NEGATIVE
COLOR UR: YELLOW
CREAT UR-MCNC: 53 MG/DL (ref 20–275)
GLUCOSE UR QL STRIP: NEGATIVE
HGB UR QL STRIP: NEGATIVE
HYALINE CASTS #/AREA URNS LPF: ABNORMAL /LPF
KETONES UR QL STRIP: NEGATIVE
LEUKOCYTE ESTERASE UR QL STRIP: ABNORMAL
MICROALBUMIN UR-MCNC: 0.9 MG/DL
NITRITE UR QL STRIP: NEGATIVE
PH UR STRIP: 6.5 [PH] (ref 5–8)
PROT UR QL STRIP: NEGATIVE
RBC #/AREA URNS HPF: ABNORMAL /HPF
SP GR UR STRIP: 1.01 (ref 1–1.03)
SQUAMOUS #/AREA URNS HPF: ABNORMAL /HPF
WBC #/AREA URNS HPF: ABNORMAL /HPF

## 2020-09-17 DIAGNOSIS — E11.65 UNCONTROLLED TYPE 2 DIABETES MELLITUS WITH HYPERGLYCEMIA: Primary | ICD-10-CM

## 2020-09-17 NOTE — TELEPHONE ENCOUNTER
----- Message from Brittney Saravia sent at 9/17/2020  8:43 AM CDT -----  Refills on Jillian plus test strips to [facility]  # [phone]

## 2020-09-23 DIAGNOSIS — Z12.31 ENCOUNTER FOR SCREENING MAMMOGRAM FOR MALIGNANT NEOPLASM OF BREAST: Primary | ICD-10-CM

## 2020-10-22 ENCOUNTER — HOSPITAL ENCOUNTER (OUTPATIENT)
Dept: RADIOLOGY | Facility: HOSPITAL | Age: 70
Discharge: HOME | End: 2020-10-22
Attending: NURSE PRACTITIONER
Payer: MEDICARE

## 2020-10-22 VITALS — HEIGHT: 62 IN | WEIGHT: 147.06 LBS | BODY MASS INDEX: 27.06 KG/M2

## 2020-10-22 DIAGNOSIS — Z12.31 ENCOUNTER FOR SCREENING MAMMOGRAM FOR MALIGNANT NEOPLASM OF BREAST: ICD-10-CM

## 2020-10-22 PROCEDURE — 77067 SCR MAMMO BI INCL CAD: CPT | Mod: TC,PO

## 2020-10-30 ENCOUNTER — TELEPHONE (OUTPATIENT)
Dept: FAMILY MEDICINE | Facility: CLINIC | Age: 70
End: 2020-10-30

## 2020-10-30 DIAGNOSIS — K21.9 GASTROESOPHAGEAL REFLUX DISEASE, UNSPECIFIED WHETHER ESOPHAGITIS PRESENT: Primary | ICD-10-CM

## 2020-10-30 DIAGNOSIS — F41.9 ANXIETY: ICD-10-CM

## 2020-10-30 RX ORDER — ALPRAZOLAM 1 MG/1
1 TABLET ORAL NIGHTLY
Qty: 30 TABLET | Refills: 2 | Status: SHIPPED | OUTPATIENT
Start: 2020-10-30 | End: 2021-02-10 | Stop reason: SDUPTHER

## 2020-10-30 RX ORDER — OMEPRAZOLE 20 MG/1
20 CAPSULE, DELAYED RELEASE ORAL 2 TIMES DAILY
Qty: 90 CAPSULE | Refills: 1 | Status: SHIPPED | OUTPATIENT
Start: 2020-10-30 | End: 2020-12-10 | Stop reason: SDUPTHER

## 2020-10-30 NOTE — TELEPHONE ENCOUNTER
----- Message from Zuhair Morgan sent at 10/30/2020  9:46 AM CDT -----  Regarding: refills  Omeprazole   [facility]   [phone]

## 2020-12-07 RX ORDER — TEMAZEPAM 30 MG/1
CAPSULE ORAL
Qty: 90 CAPSULE | Refills: 1 | Status: SHIPPED | OUTPATIENT
Start: 2020-12-07 | End: 2021-02-10 | Stop reason: SDUPTHER

## 2020-12-07 NOTE — TELEPHONE ENCOUNTER
----- Message from Brittney Saravia sent at 12/7/2020  9:37 AM CST -----  Pt calling for a refill Temazepam to [facility] [place]  # [phone]

## 2020-12-08 LAB
LEFT EYE DM RETINOPATHY: POSITIVE
RIGHT EYE DM RETINOPATHY: POSITIVE

## 2020-12-10 ENCOUNTER — OFFICE VISIT (OUTPATIENT)
Dept: FAMILY MEDICINE | Facility: CLINIC | Age: 70
End: 2020-12-10
Payer: MEDICARE

## 2020-12-10 ENCOUNTER — TELEPHONE (OUTPATIENT)
Dept: FAMILY MEDICINE | Facility: CLINIC | Age: 70
End: 2020-12-10

## 2020-12-10 ENCOUNTER — CLINICAL SUPPORT (OUTPATIENT)
Dept: FAMILY MEDICINE | Facility: CLINIC | Age: 70
End: 2020-12-10
Payer: MEDICARE

## 2020-12-10 VITALS
DIASTOLIC BLOOD PRESSURE: 68 MMHG | WEIGHT: 148 LBS | SYSTOLIC BLOOD PRESSURE: 138 MMHG | HEIGHT: 62 IN | BODY MASS INDEX: 27.23 KG/M2 | HEART RATE: 68 BPM

## 2020-12-10 DIAGNOSIS — E78.5 HYPERLIPIDEMIA, UNSPECIFIED HYPERLIPIDEMIA TYPE: ICD-10-CM

## 2020-12-10 DIAGNOSIS — I10 HYPERTENSION, UNSPECIFIED TYPE: ICD-10-CM

## 2020-12-10 DIAGNOSIS — Z79.899 HIGH RISK MEDICATION USE: ICD-10-CM

## 2020-12-10 DIAGNOSIS — E11.65 UNCONTROLLED TYPE 2 DIABETES MELLITUS WITH HYPERGLYCEMIA: ICD-10-CM

## 2020-12-10 DIAGNOSIS — M51.36 DDD (DEGENERATIVE DISC DISEASE), LUMBAR: ICD-10-CM

## 2020-12-10 DIAGNOSIS — K21.9 GASTROESOPHAGEAL REFLUX DISEASE, UNSPECIFIED WHETHER ESOPHAGITIS PRESENT: ICD-10-CM

## 2020-12-10 DIAGNOSIS — Z23 NEED FOR INFLUENZA VACCINATION: Primary | ICD-10-CM

## 2020-12-10 LAB — HBA1C MFR BLD: 6.5 %

## 2020-12-10 PROCEDURE — G0008 FLU VACCINE - QUADRIVALENT - HIGH DOSE (65+) PRESERVATIVE FREE IM: ICD-10-PCS | Mod: S$GLB,,, | Performed by: FAMILY MEDICINE

## 2020-12-10 PROCEDURE — 90662 IIV NO PRSV INCREASED AG IM: CPT | Mod: S$GLB,,, | Performed by: FAMILY MEDICINE

## 2020-12-10 PROCEDURE — 1159F MED LIST DOCD IN RCRD: CPT | Mod: S$GLB,,, | Performed by: NURSE PRACTITIONER

## 2020-12-10 PROCEDURE — 3008F BODY MASS INDEX DOCD: CPT | Mod: S$GLB,,, | Performed by: NURSE PRACTITIONER

## 2020-12-10 PROCEDURE — 3008F PR BODY MASS INDEX (BMI) DOCUMENTED: ICD-10-PCS | Mod: S$GLB,,, | Performed by: NURSE PRACTITIONER

## 2020-12-10 PROCEDURE — 83036 POCT HEMOGLOBIN A1C: ICD-10-PCS | Mod: QW,,, | Performed by: NURSE PRACTITIONER

## 2020-12-10 PROCEDURE — 83036 HEMOGLOBIN GLYCOSYLATED A1C: CPT | Mod: QW,,, | Performed by: NURSE PRACTITIONER

## 2020-12-10 PROCEDURE — 3044F PR MOST RECENT HEMOGLOBIN A1C LEVEL <7.0%: ICD-10-PCS | Mod: S$GLB,,, | Performed by: NURSE PRACTITIONER

## 2020-12-10 PROCEDURE — 90662 FLU VACCINE - QUADRIVALENT - HIGH DOSE (65+) PRESERVATIVE FREE IM: ICD-10-PCS | Mod: S$GLB,,, | Performed by: FAMILY MEDICINE

## 2020-12-10 PROCEDURE — 3075F PR MOST RECENT SYSTOLIC BLOOD PRESS GE 130-139MM HG: ICD-10-PCS | Mod: S$GLB,,, | Performed by: NURSE PRACTITIONER

## 2020-12-10 PROCEDURE — 3078F PR MOST RECENT DIASTOLIC BLOOD PRESSURE < 80 MM HG: ICD-10-PCS | Mod: S$GLB,,, | Performed by: NURSE PRACTITIONER

## 2020-12-10 PROCEDURE — 1159F PR MEDICATION LIST DOCUMENTED IN MEDICAL RECORD: ICD-10-PCS | Mod: S$GLB,,, | Performed by: NURSE PRACTITIONER

## 2020-12-10 PROCEDURE — 99214 PR OFFICE/OUTPT VISIT, EST, LEVL IV, 30-39 MIN: ICD-10-PCS | Mod: S$GLB,,, | Performed by: NURSE PRACTITIONER

## 2020-12-10 PROCEDURE — 3044F HG A1C LEVEL LT 7.0%: CPT | Mod: S$GLB,,, | Performed by: NURSE PRACTITIONER

## 2020-12-10 PROCEDURE — 3078F DIAST BP <80 MM HG: CPT | Mod: S$GLB,,, | Performed by: NURSE PRACTITIONER

## 2020-12-10 PROCEDURE — 99214 OFFICE O/P EST MOD 30 MIN: CPT | Mod: S$GLB,,, | Performed by: NURSE PRACTITIONER

## 2020-12-10 PROCEDURE — G0008 ADMIN INFLUENZA VIRUS VAC: HCPCS | Mod: S$GLB,,, | Performed by: FAMILY MEDICINE

## 2020-12-10 PROCEDURE — 3075F SYST BP GE 130 - 139MM HG: CPT | Mod: S$GLB,,, | Performed by: NURSE PRACTITIONER

## 2020-12-10 RX ORDER — HYDROCHLOROTHIAZIDE 12.5 MG/1
12.5 TABLET ORAL DAILY
Qty: 90 TABLET | Refills: 1 | Status: SHIPPED | OUTPATIENT
Start: 2020-12-10 | End: 2021-02-10 | Stop reason: SDUPTHER

## 2020-12-10 RX ORDER — ATORVASTATIN CALCIUM 40 MG/1
40 TABLET, FILM COATED ORAL DAILY
Qty: 90 TABLET | Refills: 1 | Status: SHIPPED | OUTPATIENT
Start: 2020-12-10 | End: 2021-02-10 | Stop reason: SDUPTHER

## 2020-12-10 RX ORDER — BENAZEPRIL HYDROCHLORIDE 20 MG/1
20 TABLET ORAL DAILY
Qty: 90 TABLET | Refills: 1 | Status: SHIPPED | OUTPATIENT
Start: 2020-12-10 | End: 2021-02-10 | Stop reason: SDUPTHER

## 2020-12-10 RX ORDER — MELOXICAM 15 MG/1
15 TABLET ORAL DAILY
Qty: 90 TABLET | Refills: 1 | Status: SHIPPED | OUTPATIENT
Start: 2020-12-10 | End: 2021-02-10 | Stop reason: SDUPTHER

## 2020-12-10 RX ORDER — HYDROCODONE BITARTRATE AND ACETAMINOPHEN 10; 325 MG/1; MG/1
1 TABLET ORAL EVERY 8 HOURS PRN
Qty: 21 TABLET | Refills: 0 | Status: SHIPPED | OUTPATIENT
Start: 2020-12-10 | End: 2020-12-17

## 2020-12-10 RX ORDER — OMEPRAZOLE 20 MG/1
20 CAPSULE, DELAYED RELEASE ORAL 2 TIMES DAILY
Qty: 90 CAPSULE | Refills: 1 | Status: SHIPPED | OUTPATIENT
Start: 2020-12-10 | End: 2021-02-10 | Stop reason: SDUPTHER

## 2020-12-10 RX ORDER — METFORMIN HYDROCHLORIDE 500 MG/1
500 TABLET ORAL 2 TIMES DAILY
Qty: 180 TABLET | Refills: 1 | Status: SHIPPED | OUTPATIENT
Start: 2020-12-10 | End: 2021-02-10 | Stop reason: SDUPTHER

## 2020-12-10 NOTE — PROGRESS NOTES
SUBJECTIVE:    Patient ID: Kay Valencia is a 70 y.o. female.    Chief Complaint: Diabetes (3mth, brought bottles, Eye exam req 20/20, Flu wants to talk with // JAI)    70-year-old female presents for check up.  Currently treated for diabetes hypertension hyperlipidemia anxiety and arthritis.  Reports that has not been watching diet as much as usual.  Check sugar daily.  Snacking and not as active.  Currently sleeping better. Still some family stressors. Has been having trouble with allergies. Takes otc meds with some improvement. Occasionally has back pain. Takes norco with improvement.       Office Visit on 12/10/2020   Component Date Value Ref Range Status    Hemoglobin A1C 12/10/2020 6.5  % Final   Office Visit on 09/10/2020   Component Date Value Ref Range Status    WBC 09/10/2020 4.8  3.8 - 10.8 Thousand/uL Final    RBC 09/10/2020 4.09  3.80 - 5.10 Million/uL Final    Hemoglobin 09/10/2020 12.3  11.7 - 15.5 g/dL Final    Hematocrit 09/10/2020 37.6  35.0 - 45.0 % Final    MCV 09/10/2020 91.9  80.0 - 100.0 fL Final    MCH 09/10/2020 30.1  27.0 - 33.0 pg Final    MCHC 09/10/2020 32.7  32.0 - 36.0 g/dL Final    RDW 09/10/2020 12.9  11.0 - 15.0 % Final    Platelets 09/10/2020 140  140 - 400 Thousand/uL Final    MPV 09/10/2020 11.1  7.5 - 12.5 fL Final    Neutrophils Absolute 09/10/2020 2,966  1,500 - 7,800 cells/uL Final    Lymph # 09/10/2020 1,056  850 - 3,900 cells/uL Final    Mono # 09/10/2020 437  200 - 950 cells/uL Final    Eos # 09/10/2020 269  15 - 500 cells/uL Final    Baso # 09/10/2020 72  0 - 200 cells/uL Final    Neutrophils Relative 09/10/2020 61.8  % Final    Lymph % 09/10/2020 22.0  % Final    Mono % 09/10/2020 9.1  % Final    Eosinophil % 09/10/2020 5.6  % Final    Basophil % 09/10/2020 1.5  % Final    Glucose 09/10/2020 113* 65 - 99 mg/dL Final    BUN 09/10/2020 27* 7 - 25 mg/dL Final    Creatinine 09/10/2020 0.89  0.50 - 0.99 mg/dL Final    eGFR if non   09/10/2020 66  > OR = 60 mL/min/1.73m2 Final    eGFR if African American 09/10/2020 77  > OR = 60 mL/min/1.73m2 Final    BUN/Creatinine Ratio 09/10/2020 30* 6 - 22 (calc) Final    Sodium 09/10/2020 140  135 - 146 mmol/L Final    Potassium 09/10/2020 3.8  3.5 - 5.3 mmol/L Final    Chloride 09/10/2020 101  98 - 110 mmol/L Final    CO2 09/10/2020 28  20 - 32 mmol/L Final    Calcium 09/10/2020 9.7  8.6 - 10.4 mg/dL Final    Total Protein 09/10/2020 6.8  6.1 - 8.1 g/dL Final    Albumin 09/10/2020 4.6  3.6 - 5.1 g/dL Final    Globulin, Total 09/10/2020 2.2  1.9 - 3.7 g/dL (calc) Final    Albumin/Globulin Ratio 09/10/2020 2.1  1.0 - 2.5 (calc) Final    Total Bilirubin 09/10/2020 0.8  0.2 - 1.2 mg/dL Final    Alkaline Phosphatase 09/10/2020 73  37 - 153 U/L Final    AST 09/10/2020 18  10 - 35 U/L Final    ALT 09/10/2020 29  6 - 29 U/L Final    Cholesterol 09/10/2020 219* <200 mg/dL Final    HDL 09/10/2020 51  > OR = 50 mg/dL Final    Triglycerides 09/10/2020 257* <150 mg/dL Final    LDL Cholesterol 09/10/2020 128* mg/dL (calc) Final    HDL/Cholesterol Ratio 09/10/2020 4.3  <5.0 (calc) Final    Non HDL Chol. (LDL+VLDL) 09/10/2020 168* <130 mg/dL (calc) Final    TSH w/reflex to FT4 09/10/2020 0.54  0.40 - 4.50 mIU/L Final    Creatinine, Urine 09/10/2020 53  20 - 275 mg/dL Final    Microalb, Ur 09/10/2020 0.9  See Note: mg/dL Final    Microalb/Creat Ratio 09/10/2020 17  <30 mcg/mg creat Final    Color, UA 09/10/2020 YELLOW  YELLOW Final    Appearance, UA 09/10/2020 CLEAR  CLEAR Final    Specific [place], UA 09/10/2020 1.013  1.001 - 1.035 Final    pH, UA 09/10/2020 6.5  5.0 - 8.0 Final    Glucose, UA 09/10/2020 NEGATIVE  NEGATIVE Final    Bilirubin, UA 09/10/2020 NEGATIVE  NEGATIVE Final    Ketones, UA 09/10/2020 NEGATIVE  NEGATIVE Final    Occult Blood UA 09/10/2020 NEGATIVE  NEGATIVE Final    Protein, UA 09/10/2020 NEGATIVE  NEGATIVE Final    Nitrite, UA 09/10/2020 NEGATIVE  NEGATIVE Final     Leukocytes, UA 09/10/2020 TRACE* NEGATIVE Final    WBC Casts, UA 09/10/2020 NONE SEEN  < OR = 5 /HPF Final    RBC Casts, UA 09/10/2020 NONE SEEN  < OR = 2 /HPF Final    Squam Epithel, UA 09/10/2020 NONE SEEN  < OR = 5 /HPF Final    Bacteria, UA 09/10/2020 NONE SEEN  NONE SEEN /HPF Final    Hyaline Casts, UA 09/10/2020 NONE SEEN  NONE SEEN /LPF Final    Reflexive Urine Culture 09/10/2020 CULTURE INDICATED - RESULTS TO FOLLOW   Final    Hemoglobin A1C 09/10/2020 6.5  % Final    Urine Culture, Routine 09/10/2020    Final       Past Medical History:   Diagnosis Date    Anxiety     Depression     Diabetes mellitus, type 2     GERD (gastroesophageal reflux disease)     Hyperlipidemia     Hypertension      Social History     Socioeconomic History    Marital status:      Spouse name: Not on file    Number of children: Not on file    Years of education: Not on file    Highest education level: Not on file   Occupational History    Not on file   Social Needs    Financial resource strain: Not very hard    Food insecurity     Worry: Not on file     Inability: Not on file    Transportation needs     Medical: No     Non-medical: No   Tobacco Use    Smoking status: Former Smoker    Smokeless tobacco: Never Used   Substance and Sexual Activity    Alcohol use: No     Frequency: Patient refused     Drinks per session: Patient refused     Binge frequency: Never    Drug use: No    Sexual activity: Yes   Lifestyle    Physical activity     Days per week: Not on file     Minutes per session: Not on file    Stress: To some extent   Relationships    Social connections     Talks on phone: More than three times a week     Gets together: Twice a week     Attends Hoahaoism service: Not on file     Active member of club or organization: No     Attends meetings of clubs or organizations: Never     Relationship status:    Other Topics Concern    Not on file   Social History Narrative    Not on file      Past Surgical History:   Procedure Laterality Date    BREAST BIOPSY Left 1990'S    BENIGN    HYSTERECTOMY      INCONTINENCE SURGERY       Family History   Problem Relation Age of Onset    Diabetes Maternal Grandmother     Hypertension Maternal Grandmother     Diabetes Father     Breast cancer Mother     Hypertension Mother     Diabetes Sister     Ovarian cancer Neg Hx        Review of patient's allergies indicates:   Allergen Reactions    Flexeril [cyclobenzaprine] Other (See Comments)     Unknown was a long time ago.    Sulfa (sulfonamide antibiotics) Nausea Only    Augmentin [amoxicillin-pot clavulanate] Other (See Comments)     Gas       Current Outpatient Medications:     ACCU-CHEK DAYA PLUS METER [facility], , Disp: , Rfl:     ALPRAZolam (XANAX) 1 MG tablet, Take 1 tablet (1 mg total) by mouth every evening., Disp: 30 tablet, Rfl: 2    atorvastatin (LIPITOR) 40 MG tablet, Take 1 tablet (40 mg total) by mouth once daily., Disp: 90 tablet, Rfl: 1    benazepriL (LOTENSIN) 20 MG tablet, Take 1 tablet (20 mg total) by mouth once daily., Disp: 90 tablet, Rfl: 1    blood sugar diagnostic Strp, 1 strip by Misc.(Non-Drug; Combo Route) route once daily., Disp: 100 each, Rfl: 2    blood-glucose meter kit, Use as instructed, Disp: 1 each, Rfl: 1    calcium carbonate/vitamin D3 (CALTRATE 600 PLUS D ORAL), Caltrate 600 plus D, Disp: , Rfl:     hydroCHLOROthiazide (HYDRODIURIL) 12.5 MG Tab, Take 1 tablet (12.5 mg total) by mouth once daily., Disp: 90 tablet, Rfl: 1    lancets 33 gauge Misc, 1 lancet by Misc.(Non-Drug; Combo Route) route 3 (three) times a week., Disp: 100 each, Rfl: 3    melatonin 5 mg Cap, Take by mouth., Disp: , Rfl:     meloxicam (MOBIC) 15 MG tablet, Take 1 tablet (15 mg total) by mouth once daily., Disp: 90 tablet, Rfl: 1    metFORMIN (GLUCOPHAGE) 500 MG tablet, Take 1 tablet (500 mg total) by mouth 2 (two) times daily., Disp: 180 tablet, Rfl: 1    omega 3-dha-epa-fish oil (FISH  "OIL) 100-160-1,000 mg Cap, Fish Oil, Disp: , Rfl:     omeprazole (PRILOSEC) 20 MG capsule, Take 1 capsule (20 mg total) by mouth 2 (two) times a day., Disp: 90 capsule, Rfl: 1    temazepam (RESTORIL) 30 mg capsule, TK ONE C PO QHS PRF INSOMNIA, Disp: 90 capsule, Rfl: 1    HYDROcodone-acetaminophen (NORCO)  mg per tablet, Take 1 tablet by mouth every 8 (eight) hours as needed for Pain., Disp: 21 tablet, Rfl: 0    Review of Systems   Constitutional: Negative for chills, fever and unexpected weight change.   HENT: Negative for ear pain, rhinorrhea, sinus pressure, sinus pain and sore throat.    Eyes: Negative for pain and visual disturbance.   Respiratory: Negative for cough and shortness of breath.    Cardiovascular: Negative for chest pain, palpitations and leg swelling.   Gastrointestinal: Negative for abdominal pain, diarrhea, nausea and vomiting.   Genitourinary: Negative for difficulty urinating, hematuria and vaginal bleeding.   Musculoskeletal: Positive for back pain. Negative for arthralgias.   Skin: Negative for rash.   Neurological: Negative for dizziness, weakness and headaches.   Psychiatric/Behavioral: Negative for agitation and sleep disturbance. The patient is not nervous/anxious.           Objective:      Vitals:    12/10/20 1104   BP: 138/68   Pulse: 68   Weight: 67.1 kg (148 lb)   Height: 5' 2" (1.575 m)     Physical Exam  Constitutional:       Appearance: She is well-developed.   HENT:      Right Ear: External ear normal.      Left Ear: External ear normal.   Eyes:      Conjunctiva/sclera: Conjunctivae normal.      Pupils: Pupils are equal, round, and reactive to light.   Neck:      Musculoskeletal: Normal range of motion and neck supple.      Vascular: No JVD.   Cardiovascular:      Rate and Rhythm: Normal rate and regular rhythm.      Heart sounds: No murmur.   Pulmonary:      Effort: Pulmonary effort is normal.      Breath sounds: Normal breath sounds.   Abdominal:      General: Bowel " sounds are normal.      Palpations: Abdomen is soft.   Musculoskeletal:         General: No deformity.      Lumbar back: She exhibits decreased range of motion. She exhibits no tenderness.   Lymphadenopathy:      Cervical: No cervical adenopathy.   Skin:     General: Skin is warm and dry.      Findings: No rash.   Neurological:      Mental Status: She is alert and oriented to person, place, and time.      Gait: Gait normal.   Psychiatric:         Speech: Speech normal.         Behavior: Behavior normal.           Assessment:       1. High risk medication use    2. Hyperlipidemia, unspecified hyperlipidemia type    3. Hypertension, unspecified type    4. Uncontrolled type 2 diabetes mellitus with hyperglycemia    5. Gastroesophageal reflux disease, unspecified whether esophagitis present    6. DDD (degenerative disc disease), lumbar         Plan:       High risk medication use    Hyperlipidemia, unspecified hyperlipidemia type  -     atorvastatin (LIPITOR) 40 MG tablet; Take 1 tablet (40 mg total) by mouth once daily.  Dispense: 90 tablet; Refill: 1    Hypertension, unspecified type  Comments:  stable  Orders:  -     benazepriL (LOTENSIN) 20 MG tablet; Take 1 tablet (20 mg total) by mouth once daily.  Dispense: 90 tablet; Refill: 1  -     hydroCHLOROthiazide (HYDRODIURIL) 12.5 MG Tab; Take 1 tablet (12.5 mg total) by mouth once daily.  Dispense: 90 tablet; Refill: 1    Uncontrolled type 2 diabetes mellitus with hyperglycemia  Comments:  start diet. increase activity  Orders:  -     metFORMIN (GLUCOPHAGE) 500 MG tablet; Take 1 tablet (500 mg total) by mouth 2 (two) times daily.  Dispense: 180 tablet; Refill: 1  -     Hemoglobin A1C, POCT    Gastroesophageal reflux disease, unspecified whether esophagitis present  -     omeprazole (PRILOSEC) 20 MG capsule; Take 1 capsule (20 mg total) by mouth 2 (two) times a day.  Dispense: 90 capsule; Refill: 1    DDD (degenerative disc disease), lumbar    Other orders  -      meloxicam (MOBIC) 15 MG tablet; Take 1 tablet (15 mg total) by mouth once daily.  Dispense: 90 tablet; Refill: 1  -     HYDROcodone-acetaminophen (NORCO)  mg per tablet; Take 1 tablet by mouth every 8 (eight) hours as needed for Pain.  Dispense: 21 tablet; Refill: 0      Follow up in about 3 months (around 3/10/2021) for Diabetic Check-Up.        12/14/2020 Karolina Ramirez

## 2020-12-10 NOTE — TELEPHONE ENCOUNTER
----- Message from Zuhair Morgan sent at 12/10/2020 12:13 PM CST -----  Regarding: needing call back  Pt wants to know should she get the flu shot   Pt [phone]

## 2021-02-09 ENCOUNTER — TELEPHONE (OUTPATIENT)
Dept: FAMILY MEDICINE | Facility: CLINIC | Age: 71
End: 2021-02-09

## 2021-02-10 ENCOUNTER — OFFICE VISIT (OUTPATIENT)
Dept: FAMILY MEDICINE | Facility: CLINIC | Age: 71
End: 2021-02-10
Payer: MEDICARE

## 2021-02-10 VITALS
BODY MASS INDEX: 26.87 KG/M2 | WEIGHT: 146 LBS | DIASTOLIC BLOOD PRESSURE: 78 MMHG | HEART RATE: 72 BPM | HEIGHT: 62 IN | SYSTOLIC BLOOD PRESSURE: 126 MMHG

## 2021-02-10 DIAGNOSIS — F41.9 ANXIETY: ICD-10-CM

## 2021-02-10 DIAGNOSIS — E78.5 HYPERLIPIDEMIA, UNSPECIFIED HYPERLIPIDEMIA TYPE: ICD-10-CM

## 2021-02-10 DIAGNOSIS — R01.1 SYSTOLIC MURMUR: ICD-10-CM

## 2021-02-10 DIAGNOSIS — I10 HYPERTENSION, UNSPECIFIED TYPE: ICD-10-CM

## 2021-02-10 DIAGNOSIS — Z79.899 HIGH RISK MEDICATION USE: ICD-10-CM

## 2021-02-10 DIAGNOSIS — E11.65 UNCONTROLLED TYPE 2 DIABETES MELLITUS WITH HYPERGLYCEMIA: Primary | ICD-10-CM

## 2021-02-10 DIAGNOSIS — K21.9 GASTROESOPHAGEAL REFLUX DISEASE, UNSPECIFIED WHETHER ESOPHAGITIS PRESENT: ICD-10-CM

## 2021-02-10 DIAGNOSIS — E11.65 UNCONTROLLED TYPE 2 DIABETES MELLITUS WITH HYPERGLYCEMIA: ICD-10-CM

## 2021-02-10 LAB — HBA1C MFR BLD: 6 %

## 2021-02-10 PROCEDURE — 3078F PR MOST RECENT DIASTOLIC BLOOD PRESSURE < 80 MM HG: ICD-10-PCS | Mod: S$GLB,,, | Performed by: NURSE PRACTITIONER

## 2021-02-10 PROCEDURE — 1159F MED LIST DOCD IN RCRD: CPT | Mod: S$GLB,,, | Performed by: NURSE PRACTITIONER

## 2021-02-10 PROCEDURE — 1159F PR MEDICATION LIST DOCUMENTED IN MEDICAL RECORD: ICD-10-PCS | Mod: S$GLB,,, | Performed by: NURSE PRACTITIONER

## 2021-02-10 PROCEDURE — 3074F PR MOST RECENT SYSTOLIC BLOOD PRESSURE < 130 MM HG: ICD-10-PCS | Mod: S$GLB,,, | Performed by: NURSE PRACTITIONER

## 2021-02-10 PROCEDURE — 3044F HG A1C LEVEL LT 7.0%: CPT | Mod: S$GLB,,, | Performed by: NURSE PRACTITIONER

## 2021-02-10 PROCEDURE — 3008F PR BODY MASS INDEX (BMI) DOCUMENTED: ICD-10-PCS | Mod: S$GLB,,, | Performed by: NURSE PRACTITIONER

## 2021-02-10 PROCEDURE — 3074F SYST BP LT 130 MM HG: CPT | Mod: S$GLB,,, | Performed by: NURSE PRACTITIONER

## 2021-02-10 PROCEDURE — 83036 POCT HEMOGLOBIN A1C: ICD-10-PCS | Mod: QW,,, | Performed by: NURSE PRACTITIONER

## 2021-02-10 PROCEDURE — 99214 PR OFFICE/OUTPT VISIT, EST, LEVL IV, 30-39 MIN: ICD-10-PCS | Mod: S$GLB,,, | Performed by: NURSE PRACTITIONER

## 2021-02-10 PROCEDURE — 99214 OFFICE O/P EST MOD 30 MIN: CPT | Mod: S$GLB,,, | Performed by: NURSE PRACTITIONER

## 2021-02-10 PROCEDURE — 3044F PR MOST RECENT HEMOGLOBIN A1C LEVEL <7.0%: ICD-10-PCS | Mod: S$GLB,,, | Performed by: NURSE PRACTITIONER

## 2021-02-10 PROCEDURE — 83036 HEMOGLOBIN GLYCOSYLATED A1C: CPT | Mod: QW,,, | Performed by: NURSE PRACTITIONER

## 2021-02-10 PROCEDURE — 3078F DIAST BP <80 MM HG: CPT | Mod: S$GLB,,, | Performed by: NURSE PRACTITIONER

## 2021-02-10 PROCEDURE — 3008F BODY MASS INDEX DOCD: CPT | Mod: S$GLB,,, | Performed by: NURSE PRACTITIONER

## 2021-02-10 RX ORDER — OMEPRAZOLE 20 MG/1
20 CAPSULE, DELAYED RELEASE ORAL 2 TIMES DAILY
Qty: 90 CAPSULE | Refills: 1 | Status: SHIPPED | OUTPATIENT
Start: 2021-02-10 | End: 2021-03-11 | Stop reason: SDUPTHER

## 2021-02-10 RX ORDER — TEMAZEPAM 30 MG/1
CAPSULE ORAL
Qty: 90 CAPSULE | Refills: 1 | Status: SHIPPED | OUTPATIENT
Start: 2021-02-10 | End: 2021-03-11 | Stop reason: SDUPTHER

## 2021-02-10 RX ORDER — BENAZEPRIL HYDROCHLORIDE 20 MG/1
20 TABLET ORAL DAILY
Qty: 90 TABLET | Refills: 1 | Status: SHIPPED | OUTPATIENT
Start: 2021-02-10 | End: 2021-03-11 | Stop reason: SDUPTHER

## 2021-02-10 RX ORDER — ALPRAZOLAM 1 MG/1
1 TABLET ORAL NIGHTLY
Qty: 30 TABLET | Refills: 2 | Status: SHIPPED | OUTPATIENT
Start: 2021-02-10 | End: 2021-03-11 | Stop reason: SDUPTHER

## 2021-02-10 RX ORDER — MELOXICAM 15 MG/1
15 TABLET ORAL DAILY
Qty: 90 TABLET | Refills: 1 | Status: SHIPPED | OUTPATIENT
Start: 2021-02-10 | End: 2021-03-11 | Stop reason: SDUPTHER

## 2021-02-10 RX ORDER — ATORVASTATIN CALCIUM 40 MG/1
40 TABLET, FILM COATED ORAL DAILY
Qty: 90 TABLET | Refills: 1 | Status: SHIPPED | OUTPATIENT
Start: 2021-02-10 | End: 2021-03-11 | Stop reason: SDUPTHER

## 2021-02-10 RX ORDER — METFORMIN HYDROCHLORIDE 500 MG/1
500 TABLET ORAL 2 TIMES DAILY
Qty: 180 TABLET | Refills: 1 | Status: SHIPPED | OUTPATIENT
Start: 2021-02-10 | End: 2021-03-11 | Stop reason: SDUPTHER

## 2021-02-10 RX ORDER — HYDROCODONE BITARTRATE AND ACETAMINOPHEN 10; 325 MG/1; MG/1
1 TABLET ORAL EVERY 8 HOURS PRN
COMMUNITY
End: 2022-02-10 | Stop reason: SDUPTHER

## 2021-02-10 RX ORDER — HYDROCHLOROTHIAZIDE 12.5 MG/1
12.5 TABLET ORAL DAILY
Qty: 90 TABLET | Refills: 1 | Status: SHIPPED | OUTPATIENT
Start: 2021-02-10 | End: 2021-03-11 | Stop reason: SDUPTHER

## 2021-02-12 ENCOUNTER — TELEPHONE (OUTPATIENT)
Dept: FAMILY MEDICINE | Facility: CLINIC | Age: 71
End: 2021-02-12

## 2021-02-12 LAB
ALBUMIN SERPL-MCNC: 4.9 G/DL (ref 3.6–5.1)
ALBUMIN/CREAT UR: 39 MCG/MG CREAT
ALBUMIN/GLOB SERPL: 2.2 (CALC) (ref 1–2.5)
ALP SERPL-CCNC: 71 U/L (ref 37–153)
ALT SERPL-CCNC: 27 U/L (ref 6–29)
APPEARANCE UR: CLEAR
AST SERPL-CCNC: 22 U/L (ref 10–35)
BACTERIA #/AREA URNS HPF: ABNORMAL /HPF
BACTERIA UR CULT: NORMAL
BASOPHILS # BLD AUTO: 49 CELLS/UL (ref 0–200)
BASOPHILS NFR BLD AUTO: 1.2 %
BILIRUB SERPL-MCNC: 1 MG/DL (ref 0.2–1.2)
BILIRUB UR QL STRIP: NEGATIVE
BUN SERPL-MCNC: 23 MG/DL (ref 7–25)
BUN/CREAT SERPL: ABNORMAL (CALC) (ref 6–22)
CALCIUM SERPL-MCNC: 10.2 MG/DL (ref 8.6–10.4)
CHLORIDE SERPL-SCNC: 100 MMOL/L (ref 98–110)
CHOLEST SERPL-MCNC: 233 MG/DL
CHOLEST/HDLC SERPL: 4.7 (CALC)
CO2 SERPL-SCNC: 31 MMOL/L (ref 20–32)
COLOR UR: YELLOW
CREAT SERPL-MCNC: 0.92 MG/DL (ref 0.5–0.99)
CREAT UR-MCNC: 28 MG/DL (ref 20–275)
EOSINOPHIL # BLD AUTO: 242 CELLS/UL (ref 15–500)
EOSINOPHIL NFR BLD AUTO: 5.9 %
ERYTHROCYTE [DISTWIDTH] IN BLOOD BY AUTOMATED COUNT: 12.5 % (ref 11–15)
GFRSERPLBLD MDRD-ARVRAT: 64 ML/MIN/1.73M2
GLOBULIN SER CALC-MCNC: 2.2 G/DL (CALC) (ref 1.9–3.7)
GLUCOSE SERPL-MCNC: 114 MG/DL (ref 65–99)
GLUCOSE UR QL STRIP: NEGATIVE
HCT VFR BLD AUTO: 37.5 % (ref 35–45)
HDLC SERPL-MCNC: 50 MG/DL
HGB BLD-MCNC: 12.1 G/DL (ref 11.7–15.5)
HGB UR QL STRIP: NEGATIVE
HYALINE CASTS #/AREA URNS LPF: ABNORMAL /LPF
KETONES UR QL STRIP: NEGATIVE
LDLC SERPL CALC-MCNC: 144 MG/DL (CALC)
LEUKOCYTE ESTERASE UR QL STRIP: ABNORMAL
LYMPHOCYTES # BLD AUTO: 1181 CELLS/UL (ref 850–3900)
LYMPHOCYTES NFR BLD AUTO: 28.8 %
MCH RBC QN AUTO: 29.7 PG (ref 27–33)
MCHC RBC AUTO-ENTMCNC: 32.3 G/DL (ref 32–36)
MCV RBC AUTO: 92.1 FL (ref 80–100)
MICROALBUMIN UR-MCNC: 1.1 MG/DL
MONOCYTES # BLD AUTO: 439 CELLS/UL (ref 200–950)
MONOCYTES NFR BLD AUTO: 10.7 %
NEUTROPHILS # BLD AUTO: 2189 CELLS/UL (ref 1500–7800)
NEUTROPHILS NFR BLD AUTO: 53.4 %
NITRITE UR QL STRIP: NEGATIVE
NONHDLC SERPL-MCNC: 183 MG/DL (CALC)
PH UR STRIP: 7.5 [PH] (ref 5–8)
PLATELET # BLD AUTO: 148 THOUSAND/UL (ref 140–400)
PMV BLD REES-ECKER: 11.1 FL (ref 7.5–12.5)
POTASSIUM SERPL-SCNC: 3.7 MMOL/L (ref 3.5–5.3)
PROT SERPL-MCNC: 7.1 G/DL (ref 6.1–8.1)
PROT UR QL STRIP: NEGATIVE
RBC # BLD AUTO: 4.07 MILLION/UL (ref 3.8–5.1)
RBC #/AREA URNS HPF: ABNORMAL /HPF
SODIUM SERPL-SCNC: 140 MMOL/L (ref 135–146)
SP GR UR STRIP: 1.01 (ref 1–1.03)
SQUAMOUS #/AREA URNS HPF: ABNORMAL /HPF
T4 FREE SERPL-MCNC: 1.3 NG/DL (ref 0.8–1.8)
TRIGL SERPL-MCNC: 251 MG/DL
TSH SERPL-ACNC: 0.36 MIU/L (ref 0.4–4.5)
WBC # BLD AUTO: 4.1 THOUSAND/UL (ref 3.8–10.8)
WBC #/AREA URNS HPF: ABNORMAL /HPF

## 2021-02-25 ENCOUNTER — TELEPHONE (OUTPATIENT)
Dept: CARDIOLOGY | Facility: HOSPITAL | Age: 71
End: 2021-02-25

## 2021-02-26 ENCOUNTER — CLINICAL SUPPORT (OUTPATIENT)
Dept: CARDIOLOGY | Facility: HOSPITAL | Age: 71
End: 2021-02-26
Attending: NURSE PRACTITIONER
Payer: MEDICARE

## 2021-02-26 ENCOUNTER — TELEPHONE (OUTPATIENT)
Dept: FAMILY MEDICINE | Facility: CLINIC | Age: 71
End: 2021-02-26

## 2021-02-26 VITALS — HEIGHT: 62 IN | WEIGHT: 146 LBS | BODY MASS INDEX: 26.87 KG/M2

## 2021-02-26 DIAGNOSIS — R01.1 SYSTOLIC MURMUR: ICD-10-CM

## 2021-02-26 PROCEDURE — 93306 TTE W/DOPPLER COMPLETE: CPT

## 2021-02-27 LAB
AORTIC ROOT ANNULUS: 2.52 CM
AORTIC VALVE CUSP SEPERATION: 1.78 CM
AV INDEX (PROSTH): 0.78
AV MEAN GRADIENT: 4 MMHG
AV PEAK GRADIENT: 6 MMHG
AV VALVE AREA: 2.49 CM2
AV VELOCITY RATIO: 84.42
BSA FOR ECHO PROCEDURE: 1.7 M2
CV ECHO LV RWT: 0.5 CM
DOP CALC AO PEAK VEL: 1.22 M/S
DOP CALC AO VTI: 26.64 CM
DOP CALC LVOT AREA: 3.2 CM2
DOP CALC LVOT DIAMETER: 2.01 CM
DOP CALC LVOT PEAK VEL: 102.99 M/S
DOP CALC LVOT STROKE VOLUME: 66.25 CM3
DOP CALCLVOT PEAK VEL VTI: 20.89 CM
E WAVE DECELERATION TIME: 193.69 MSEC
E/A RATIO: 0.92
E/E' RATIO: 14.13 M/S
ECHO LV POSTERIOR WALL: 0.92 CM (ref 0.6–1.1)
FRACTIONAL SHORTENING: 43 % (ref 28–44)
INTERVENTRICULAR SEPTUM: 0.92 CM (ref 0.6–1.1)
IVRT: 124.32 MSEC
LEFT ATRIUM SIZE: 3.24 CM
LEFT INTERNAL DIMENSION IN SYSTOLE: 2.07 CM (ref 2.1–4)
LEFT VENTRICLE MASS INDEX: 59 G/M2
LEFT VENTRICULAR INTERNAL DIMENSION IN DIASTOLE: 3.65 CM (ref 3.5–6)
LEFT VENTRICULAR MASS: 97.81 G
LV LATERAL E/E' RATIO: 17.67 M/S
LV SEPTAL E/E' RATIO: 11.78 M/S
MV PEAK A VEL: 1.15 M/S
MV PEAK E VEL: 1.06 M/S
PISA TR MAX VEL: 2.22 M/S
PV PEAK VELOCITY: 89.63 CM/S
RA PRESSURE: 3 MMHG
RIGHT VENTRICULAR END-DIASTOLIC DIMENSION: 286 CM
TDI LATERAL: 0.06 M/S
TDI SEPTAL: 0.09 M/S
TDI: 0.08 M/S
TR MAX PG: 20 MMHG
TV REST PULMONARY ARTERY PRESSURE: 23 MMHG

## 2021-03-01 ENCOUNTER — TELEPHONE (OUTPATIENT)
Dept: FAMILY MEDICINE | Facility: CLINIC | Age: 71
End: 2021-03-01

## 2021-03-10 ENCOUNTER — TELEPHONE (OUTPATIENT)
Dept: FAMILY MEDICINE | Facility: CLINIC | Age: 71
End: 2021-03-10

## 2021-03-11 ENCOUNTER — OFFICE VISIT (OUTPATIENT)
Dept: FAMILY MEDICINE | Facility: CLINIC | Age: 71
End: 2021-03-11
Payer: MEDICARE

## 2021-03-11 VITALS
HEIGHT: 62 IN | WEIGHT: 145 LBS | SYSTOLIC BLOOD PRESSURE: 138 MMHG | DIASTOLIC BLOOD PRESSURE: 56 MMHG | HEART RATE: 72 BPM | BODY MASS INDEX: 26.68 KG/M2

## 2021-03-11 DIAGNOSIS — F41.9 ANXIETY: ICD-10-CM

## 2021-03-11 DIAGNOSIS — E11.65 UNCONTROLLED TYPE 2 DIABETES MELLITUS WITH HYPERGLYCEMIA: ICD-10-CM

## 2021-03-11 DIAGNOSIS — L73.9 FOLLICULITIS: Primary | ICD-10-CM

## 2021-03-11 DIAGNOSIS — K21.9 GASTROESOPHAGEAL REFLUX DISEASE, UNSPECIFIED WHETHER ESOPHAGITIS PRESENT: ICD-10-CM

## 2021-03-11 DIAGNOSIS — L30.9 DERMATITIS: ICD-10-CM

## 2021-03-11 DIAGNOSIS — I10 HYPERTENSION, UNSPECIFIED TYPE: ICD-10-CM

## 2021-03-11 DIAGNOSIS — E78.5 HYPERLIPIDEMIA, UNSPECIFIED HYPERLIPIDEMIA TYPE: ICD-10-CM

## 2021-03-11 PROCEDURE — 3075F PR MOST RECENT SYSTOLIC BLOOD PRESS GE 130-139MM HG: ICD-10-PCS | Mod: S$GLB,,, | Performed by: NURSE PRACTITIONER

## 2021-03-11 PROCEDURE — 1159F PR MEDICATION LIST DOCUMENTED IN MEDICAL RECORD: ICD-10-PCS | Mod: S$GLB,,, | Performed by: NURSE PRACTITIONER

## 2021-03-11 PROCEDURE — 1159F MED LIST DOCD IN RCRD: CPT | Mod: S$GLB,,, | Performed by: NURSE PRACTITIONER

## 2021-03-11 PROCEDURE — 99214 OFFICE O/P EST MOD 30 MIN: CPT | Mod: S$GLB,,, | Performed by: NURSE PRACTITIONER

## 2021-03-11 PROCEDURE — 3008F PR BODY MASS INDEX (BMI) DOCUMENTED: ICD-10-PCS | Mod: S$GLB,,, | Performed by: NURSE PRACTITIONER

## 2021-03-11 PROCEDURE — 99214 PR OFFICE/OUTPT VISIT, EST, LEVL IV, 30-39 MIN: ICD-10-PCS | Mod: S$GLB,,, | Performed by: NURSE PRACTITIONER

## 2021-03-11 PROCEDURE — 3044F HG A1C LEVEL LT 7.0%: CPT | Mod: S$GLB,,, | Performed by: NURSE PRACTITIONER

## 2021-03-11 PROCEDURE — 3008F BODY MASS INDEX DOCD: CPT | Mod: S$GLB,,, | Performed by: NURSE PRACTITIONER

## 2021-03-11 PROCEDURE — 3078F DIAST BP <80 MM HG: CPT | Mod: S$GLB,,, | Performed by: NURSE PRACTITIONER

## 2021-03-11 PROCEDURE — 3044F PR MOST RECENT HEMOGLOBIN A1C LEVEL <7.0%: ICD-10-PCS | Mod: S$GLB,,, | Performed by: NURSE PRACTITIONER

## 2021-03-11 PROCEDURE — 3075F SYST BP GE 130 - 139MM HG: CPT | Mod: S$GLB,,, | Performed by: NURSE PRACTITIONER

## 2021-03-11 PROCEDURE — 3078F PR MOST RECENT DIASTOLIC BLOOD PRESSURE < 80 MM HG: ICD-10-PCS | Mod: S$GLB,,, | Performed by: NURSE PRACTITIONER

## 2021-03-11 RX ORDER — ATORVASTATIN CALCIUM 40 MG/1
40 TABLET, FILM COATED ORAL DAILY
Qty: 90 TABLET | Refills: 1 | Status: SHIPPED | OUTPATIENT
Start: 2021-03-11 | End: 2021-08-09 | Stop reason: SDUPTHER

## 2021-03-11 RX ORDER — METFORMIN HYDROCHLORIDE 500 MG/1
500 TABLET ORAL 2 TIMES DAILY
Qty: 180 TABLET | Refills: 1 | Status: SHIPPED | OUTPATIENT
Start: 2021-03-11 | End: 2021-08-09 | Stop reason: SDUPTHER

## 2021-03-11 RX ORDER — TEMAZEPAM 30 MG/1
CAPSULE ORAL
Qty: 90 CAPSULE | Refills: 1 | Status: SHIPPED | OUTPATIENT
Start: 2021-03-11 | End: 2021-08-09 | Stop reason: SDUPTHER

## 2021-03-11 RX ORDER — BENAZEPRIL HYDROCHLORIDE 20 MG/1
20 TABLET ORAL DAILY
Qty: 90 TABLET | Refills: 1 | Status: SHIPPED | OUTPATIENT
Start: 2021-03-11 | End: 2021-08-09 | Stop reason: SDUPTHER

## 2021-03-11 RX ORDER — TRIAMCINOLONE ACETONIDE 1 MG/G
OINTMENT TOPICAL 2 TIMES DAILY
Qty: 80 G | Refills: 0 | Status: SHIPPED | OUTPATIENT
Start: 2021-03-11 | End: 2021-11-09

## 2021-03-11 RX ORDER — ALPRAZOLAM 1 MG/1
1 TABLET ORAL NIGHTLY
Qty: 30 TABLET | Refills: 2 | Status: SHIPPED | OUTPATIENT
Start: 2021-03-11 | End: 2021-08-09 | Stop reason: SDUPTHER

## 2021-03-11 RX ORDER — MELOXICAM 15 MG/1
15 TABLET ORAL DAILY
Qty: 90 TABLET | Refills: 1 | Status: SHIPPED | OUTPATIENT
Start: 2021-03-11 | End: 2021-08-09 | Stop reason: SDUPTHER

## 2021-03-11 RX ORDER — CEPHALEXIN 500 MG/1
500 CAPSULE ORAL EVERY 8 HOURS
Qty: 30 CAPSULE | Refills: 0 | Status: SHIPPED | OUTPATIENT
Start: 2021-03-11 | End: 2021-04-22 | Stop reason: SDUPTHER

## 2021-03-11 RX ORDER — OMEPRAZOLE 20 MG/1
20 CAPSULE, DELAYED RELEASE ORAL 2 TIMES DAILY
Qty: 90 CAPSULE | Refills: 1 | Status: SHIPPED | OUTPATIENT
Start: 2021-03-11 | End: 2021-08-09 | Stop reason: SDUPTHER

## 2021-03-11 RX ORDER — HYDROCHLOROTHIAZIDE 12.5 MG/1
12.5 TABLET ORAL DAILY
Qty: 90 TABLET | Refills: 1 | Status: SHIPPED | OUTPATIENT
Start: 2021-03-11 | End: 2021-08-09 | Stop reason: SDUPTHER

## 2021-03-11 RX ORDER — MUPIROCIN 20 MG/G
OINTMENT TOPICAL 3 TIMES DAILY
Qty: 30 G | Refills: 0 | Status: SHIPPED | OUTPATIENT
Start: 2021-03-11 | End: 2021-08-09

## 2021-03-15 ENCOUNTER — TELEPHONE (OUTPATIENT)
Dept: FAMILY MEDICINE | Facility: CLINIC | Age: 71
End: 2021-03-15

## 2021-03-15 RX ORDER — DOXYCYCLINE HYCLATE 100 MG
100 TABLET ORAL 2 TIMES DAILY
Qty: 20 TABLET | Refills: 0 | Status: SHIPPED | OUTPATIENT
Start: 2021-03-15 | End: 2021-04-22 | Stop reason: SDUPTHER

## 2021-04-22 ENCOUNTER — PATIENT MESSAGE (OUTPATIENT)
Dept: FAMILY MEDICINE | Facility: CLINIC | Age: 71
End: 2021-04-22

## 2021-04-22 ENCOUNTER — TELEPHONE (OUTPATIENT)
Dept: FAMILY MEDICINE | Facility: CLINIC | Age: 71
End: 2021-04-22

## 2021-04-22 DIAGNOSIS — L73.9 FOLLICULITIS: ICD-10-CM

## 2021-04-22 RX ORDER — CEPHALEXIN 500 MG/1
500 CAPSULE ORAL EVERY 8 HOURS
Qty: 30 CAPSULE | Refills: 0 | Status: SHIPPED | OUTPATIENT
Start: 2021-04-22 | End: 2021-04-22

## 2021-04-22 RX ORDER — DOXYCYCLINE HYCLATE 100 MG
100 TABLET ORAL 2 TIMES DAILY
Qty: 20 TABLET | Refills: 0 | Status: SHIPPED | OUTPATIENT
Start: 2021-04-22 | End: 2021-08-09

## 2021-04-30 ENCOUNTER — TELEPHONE (OUTPATIENT)
Dept: FAMILY MEDICINE | Facility: CLINIC | Age: 71
End: 2021-04-30

## 2021-05-05 ENCOUNTER — OFFICE VISIT (OUTPATIENT)
Dept: FAMILY MEDICINE | Facility: CLINIC | Age: 71
End: 2021-05-05
Payer: MEDICARE

## 2021-05-05 VITALS
DIASTOLIC BLOOD PRESSURE: 78 MMHG | BODY MASS INDEX: 26.87 KG/M2 | SYSTOLIC BLOOD PRESSURE: 125 MMHG | HEIGHT: 62 IN | HEART RATE: 76 BPM | WEIGHT: 146 LBS

## 2021-05-05 DIAGNOSIS — L40.9 PSORIASIS: ICD-10-CM

## 2021-05-05 DIAGNOSIS — L73.9 FOLLICULITIS: ICD-10-CM

## 2021-05-05 DIAGNOSIS — F41.9 ANXIETY: ICD-10-CM

## 2021-05-05 DIAGNOSIS — Z79.899 HIGH RISK MEDICATION USE: ICD-10-CM

## 2021-05-05 DIAGNOSIS — E11.65 UNCONTROLLED TYPE 2 DIABETES MELLITUS WITH HYPERGLYCEMIA: ICD-10-CM

## 2021-05-05 DIAGNOSIS — B95.8 STAPH INFECTION: Primary | ICD-10-CM

## 2021-05-05 DIAGNOSIS — I10 HYPERTENSION, UNSPECIFIED TYPE: ICD-10-CM

## 2021-05-05 DIAGNOSIS — E78.5 HYPERLIPIDEMIA, UNSPECIFIED HYPERLIPIDEMIA TYPE: ICD-10-CM

## 2021-05-05 DIAGNOSIS — F51.01 PRIMARY INSOMNIA: ICD-10-CM

## 2021-05-05 DIAGNOSIS — L30.9 DERMATITIS: ICD-10-CM

## 2021-05-05 DIAGNOSIS — K21.9 GASTROESOPHAGEAL REFLUX DISEASE, UNSPECIFIED WHETHER ESOPHAGITIS PRESENT: ICD-10-CM

## 2021-05-05 LAB — HBA1C MFR BLD: 6.6 %

## 2021-05-05 PROCEDURE — 99214 PR OFFICE/OUTPT VISIT, EST, LEVL IV, 30-39 MIN: ICD-10-PCS | Mod: S$GLB,,, | Performed by: NURSE PRACTITIONER

## 2021-05-05 PROCEDURE — 83036 HEMOGLOBIN GLYCOSYLATED A1C: CPT | Mod: QW,,, | Performed by: NURSE PRACTITIONER

## 2021-05-05 PROCEDURE — 3044F HG A1C LEVEL LT 7.0%: CPT | Mod: S$GLB,,, | Performed by: NURSE PRACTITIONER

## 2021-05-05 PROCEDURE — 3008F BODY MASS INDEX DOCD: CPT | Mod: S$GLB,,, | Performed by: NURSE PRACTITIONER

## 2021-05-05 PROCEDURE — 1159F PR MEDICATION LIST DOCUMENTED IN MEDICAL RECORD: ICD-10-PCS | Mod: S$GLB,,, | Performed by: NURSE PRACTITIONER

## 2021-05-05 PROCEDURE — 1101F PR PT FALLS ASSESS DOC 0-1 FALLS W/OUT INJ PAST YR: ICD-10-PCS | Mod: S$GLB,,, | Performed by: NURSE PRACTITIONER

## 2021-05-05 PROCEDURE — 83036 POCT HEMOGLOBIN A1C: ICD-10-PCS | Mod: QW,,, | Performed by: NURSE PRACTITIONER

## 2021-05-05 PROCEDURE — 99214 OFFICE O/P EST MOD 30 MIN: CPT | Mod: S$GLB,,, | Performed by: NURSE PRACTITIONER

## 2021-05-05 PROCEDURE — 3288F FALL RISK ASSESSMENT DOCD: CPT | Mod: S$GLB,,, | Performed by: NURSE PRACTITIONER

## 2021-05-05 PROCEDURE — 3008F PR BODY MASS INDEX (BMI) DOCUMENTED: ICD-10-PCS | Mod: S$GLB,,, | Performed by: NURSE PRACTITIONER

## 2021-05-05 PROCEDURE — 1101F PT FALLS ASSESS-DOCD LE1/YR: CPT | Mod: S$GLB,,, | Performed by: NURSE PRACTITIONER

## 2021-05-05 PROCEDURE — 1159F MED LIST DOCD IN RCRD: CPT | Mod: S$GLB,,, | Performed by: NURSE PRACTITIONER

## 2021-05-05 PROCEDURE — 3288F PR FALLS RISK ASSESSMENT DOCUMENTED: ICD-10-PCS | Mod: S$GLB,,, | Performed by: NURSE PRACTITIONER

## 2021-05-05 PROCEDURE — 3044F PR MOST RECENT HEMOGLOBIN A1C LEVEL <7.0%: ICD-10-PCS | Mod: S$GLB,,, | Performed by: NURSE PRACTITIONER

## 2021-05-05 RX ORDER — MAG HYDROX/ALUMINUM HYD/SIMETH 200-200-20
SUSPENSION, ORAL (FINAL DOSE FORM) ORAL 2 TIMES DAILY
Qty: 57 G | Refills: 0 | Status: SHIPPED | OUTPATIENT
Start: 2021-05-05 | End: 2023-06-27

## 2021-05-05 RX ORDER — CHLORHEXIDINE GLUCONATE 40 MG/ML
SOLUTION TOPICAL
Qty: 473 ML | Refills: 0 | Status: SHIPPED | OUTPATIENT
Start: 2021-05-06 | End: 2021-08-09

## 2021-05-27 ENCOUNTER — OFFICE VISIT (OUTPATIENT)
Dept: DERMATOLOGY | Facility: CLINIC | Age: 71
End: 2021-05-27
Payer: MEDICARE

## 2021-05-27 DIAGNOSIS — L97.919 NON-PRESSURE ULCER OF RIGHT LOWER EXTREMITY, UNSPECIFIED ULCER STAGE: Primary | ICD-10-CM

## 2021-05-27 DIAGNOSIS — L82.1 SEBORRHEIC KERATOSES: ICD-10-CM

## 2021-05-27 DIAGNOSIS — L30.9 ECZEMA, UNSPECIFIED TYPE: ICD-10-CM

## 2021-05-27 PROCEDURE — 99203 OFFICE O/P NEW LOW 30 MIN: CPT | Mod: 25,S$GLB,, | Performed by: STUDENT IN AN ORGANIZED HEALTH CARE EDUCATION/TRAINING PROGRAM

## 2021-05-27 PROCEDURE — 99999 PR PBB SHADOW E&M-EST. PATIENT-LVL IV: ICD-10-PCS | Mod: PBBFAC,,, | Performed by: STUDENT IN AN ORGANIZED HEALTH CARE EDUCATION/TRAINING PROGRAM

## 2021-05-27 PROCEDURE — 99203 PR OFFICE/OUTPT VISIT, NEW, LEVL III, 30-44 MIN: ICD-10-PCS | Mod: 25,S$GLB,, | Performed by: STUDENT IN AN ORGANIZED HEALTH CARE EDUCATION/TRAINING PROGRAM

## 2021-05-27 PROCEDURE — 1101F PT FALLS ASSESS-DOCD LE1/YR: CPT | Mod: CPTII,S$GLB,, | Performed by: STUDENT IN AN ORGANIZED HEALTH CARE EDUCATION/TRAINING PROGRAM

## 2021-05-27 PROCEDURE — 3288F PR FALLS RISK ASSESSMENT DOCUMENTED: ICD-10-PCS | Mod: CPTII,S$GLB,, | Performed by: STUDENT IN AN ORGANIZED HEALTH CARE EDUCATION/TRAINING PROGRAM

## 2021-05-27 PROCEDURE — 1126F AMNT PAIN NOTED NONE PRSNT: CPT | Mod: S$GLB,,, | Performed by: STUDENT IN AN ORGANIZED HEALTH CARE EDUCATION/TRAINING PROGRAM

## 2021-05-27 PROCEDURE — 88305 TISSUE EXAM BY PATHOLOGIST: CPT | Mod: 26,,, | Performed by: PATHOLOGY

## 2021-05-27 PROCEDURE — 1101F PR PT FALLS ASSESS DOC 0-1 FALLS W/OUT INJ PAST YR: ICD-10-PCS | Mod: CPTII,S$GLB,, | Performed by: STUDENT IN AN ORGANIZED HEALTH CARE EDUCATION/TRAINING PROGRAM

## 2021-05-27 PROCEDURE — 3044F HG A1C LEVEL LT 7.0%: CPT | Mod: CPTII,S$GLB,, | Performed by: STUDENT IN AN ORGANIZED HEALTH CARE EDUCATION/TRAINING PROGRAM

## 2021-05-27 PROCEDURE — 1159F MED LIST DOCD IN RCRD: CPT | Mod: S$GLB,,, | Performed by: STUDENT IN AN ORGANIZED HEALTH CARE EDUCATION/TRAINING PROGRAM

## 2021-05-27 PROCEDURE — 88305 TISSUE EXAM BY PATHOLOGIST: CPT | Performed by: PATHOLOGY

## 2021-05-27 PROCEDURE — 88312 SPECIAL STAINS GROUP 1: CPT | Mod: 26,,, | Performed by: PATHOLOGY

## 2021-05-27 PROCEDURE — 1126F PR PAIN SEVERITY QUANTIFIED, NO PAIN PRESENT: ICD-10-PCS | Mod: S$GLB,,, | Performed by: STUDENT IN AN ORGANIZED HEALTH CARE EDUCATION/TRAINING PROGRAM

## 2021-05-27 PROCEDURE — 1159F PR MEDICATION LIST DOCUMENTED IN MEDICAL RECORD: ICD-10-PCS | Mod: S$GLB,,, | Performed by: STUDENT IN AN ORGANIZED HEALTH CARE EDUCATION/TRAINING PROGRAM

## 2021-05-27 PROCEDURE — 3288F FALL RISK ASSESSMENT DOCD: CPT | Mod: CPTII,S$GLB,, | Performed by: STUDENT IN AN ORGANIZED HEALTH CARE EDUCATION/TRAINING PROGRAM

## 2021-05-27 PROCEDURE — 88312 PR  SPECIAL STAINS,GROUP I: ICD-10-PCS | Mod: 26,,, | Performed by: PATHOLOGY

## 2021-05-27 PROCEDURE — 11104 PR PUNCH BIOPSY, SKIN, SINGLE LESION: ICD-10-PCS | Mod: S$GLB,,, | Performed by: STUDENT IN AN ORGANIZED HEALTH CARE EDUCATION/TRAINING PROGRAM

## 2021-05-27 PROCEDURE — 88305 TISSUE EXAM BY PATHOLOGIST: ICD-10-PCS | Mod: 26,,, | Performed by: PATHOLOGY

## 2021-05-27 PROCEDURE — 88312 SPECIAL STAINS GROUP 1: CPT | Performed by: PATHOLOGY

## 2021-05-27 PROCEDURE — 3044F PR MOST RECENT HEMOGLOBIN A1C LEVEL <7.0%: ICD-10-PCS | Mod: CPTII,S$GLB,, | Performed by: STUDENT IN AN ORGANIZED HEALTH CARE EDUCATION/TRAINING PROGRAM

## 2021-05-27 PROCEDURE — 11104 PUNCH BX SKIN SINGLE LESION: CPT | Mod: S$GLB,,, | Performed by: STUDENT IN AN ORGANIZED HEALTH CARE EDUCATION/TRAINING PROGRAM

## 2021-05-27 PROCEDURE — 99999 PR PBB SHADOW E&M-EST. PATIENT-LVL IV: CPT | Mod: PBBFAC,,, | Performed by: STUDENT IN AN ORGANIZED HEALTH CARE EDUCATION/TRAINING PROGRAM

## 2021-05-27 RX ORDER — MUPIROCIN 20 MG/G
OINTMENT TOPICAL 2 TIMES DAILY
Qty: 22 G | Refills: 0 | Status: SHIPPED | OUTPATIENT
Start: 2021-05-27 | End: 2021-08-09

## 2021-06-02 LAB
FINAL PATHOLOGIC DIAGNOSIS: NORMAL
GROSS: NORMAL
Lab: NORMAL
MICROSCOPIC EXAM: NORMAL

## 2021-06-14 ENCOUNTER — OFFICE VISIT (OUTPATIENT)
Dept: DERMATOLOGY | Facility: CLINIC | Age: 71
End: 2021-06-14
Payer: MEDICARE

## 2021-06-14 DIAGNOSIS — L85.1 KERATODERMA CLIMACTERICUM: Primary | ICD-10-CM

## 2021-06-14 DIAGNOSIS — L98.9 SKIN EROSION: ICD-10-CM

## 2021-06-14 DIAGNOSIS — L85.3 XEROSIS CUTIS: ICD-10-CM

## 2021-06-14 DIAGNOSIS — T07.XXXA MULTIPLE EXCORIATIONS: ICD-10-CM

## 2021-06-14 PROCEDURE — 99213 OFFICE O/P EST LOW 20 MIN: CPT | Mod: S$GLB,,, | Performed by: STUDENT IN AN ORGANIZED HEALTH CARE EDUCATION/TRAINING PROGRAM

## 2021-06-14 PROCEDURE — 3288F FALL RISK ASSESSMENT DOCD: CPT | Mod: CPTII,S$GLB,, | Performed by: STUDENT IN AN ORGANIZED HEALTH CARE EDUCATION/TRAINING PROGRAM

## 2021-06-14 PROCEDURE — 99999 PR PBB SHADOW E&M-EST. PATIENT-LVL III: CPT | Mod: PBBFAC,,, | Performed by: STUDENT IN AN ORGANIZED HEALTH CARE EDUCATION/TRAINING PROGRAM

## 2021-06-14 PROCEDURE — 1101F PT FALLS ASSESS-DOCD LE1/YR: CPT | Mod: CPTII,S$GLB,, | Performed by: STUDENT IN AN ORGANIZED HEALTH CARE EDUCATION/TRAINING PROGRAM

## 2021-06-14 PROCEDURE — 99213 PR OFFICE/OUTPT VISIT, EST, LEVL III, 20-29 MIN: ICD-10-PCS | Mod: S$GLB,,, | Performed by: STUDENT IN AN ORGANIZED HEALTH CARE EDUCATION/TRAINING PROGRAM

## 2021-06-14 PROCEDURE — 1101F PR PT FALLS ASSESS DOC 0-1 FALLS W/OUT INJ PAST YR: ICD-10-PCS | Mod: CPTII,S$GLB,, | Performed by: STUDENT IN AN ORGANIZED HEALTH CARE EDUCATION/TRAINING PROGRAM

## 2021-06-14 PROCEDURE — 1159F MED LIST DOCD IN RCRD: CPT | Mod: S$GLB,,, | Performed by: STUDENT IN AN ORGANIZED HEALTH CARE EDUCATION/TRAINING PROGRAM

## 2021-06-14 PROCEDURE — 1126F AMNT PAIN NOTED NONE PRSNT: CPT | Mod: S$GLB,,, | Performed by: STUDENT IN AN ORGANIZED HEALTH CARE EDUCATION/TRAINING PROGRAM

## 2021-06-14 PROCEDURE — 1159F PR MEDICATION LIST DOCUMENTED IN MEDICAL RECORD: ICD-10-PCS | Mod: S$GLB,,, | Performed by: STUDENT IN AN ORGANIZED HEALTH CARE EDUCATION/TRAINING PROGRAM

## 2021-06-14 PROCEDURE — 1126F PR PAIN SEVERITY QUANTIFIED, NO PAIN PRESENT: ICD-10-PCS | Mod: S$GLB,,, | Performed by: STUDENT IN AN ORGANIZED HEALTH CARE EDUCATION/TRAINING PROGRAM

## 2021-06-14 PROCEDURE — 99999 PR PBB SHADOW E&M-EST. PATIENT-LVL III: ICD-10-PCS | Mod: PBBFAC,,, | Performed by: STUDENT IN AN ORGANIZED HEALTH CARE EDUCATION/TRAINING PROGRAM

## 2021-06-14 PROCEDURE — 3288F PR FALLS RISK ASSESSMENT DOCUMENTED: ICD-10-PCS | Mod: CPTII,S$GLB,, | Performed by: STUDENT IN AN ORGANIZED HEALTH CARE EDUCATION/TRAINING PROGRAM

## 2021-06-14 RX ORDER — UREA 40 %
CREAM (GRAM) TOPICAL DAILY
Qty: 198 G | Refills: 1 | Status: SHIPPED | OUTPATIENT
Start: 2021-06-14 | End: 2021-11-09

## 2021-08-09 ENCOUNTER — OFFICE VISIT (OUTPATIENT)
Dept: FAMILY MEDICINE | Facility: CLINIC | Age: 71
End: 2021-08-09
Payer: MEDICARE

## 2021-08-09 VITALS
DIASTOLIC BLOOD PRESSURE: 84 MMHG | WEIGHT: 148 LBS | SYSTOLIC BLOOD PRESSURE: 162 MMHG | BODY MASS INDEX: 27.23 KG/M2 | HEART RATE: 76 BPM | HEIGHT: 62 IN

## 2021-08-09 DIAGNOSIS — Z79.899 HIGH RISK MEDICATION USE: ICD-10-CM

## 2021-08-09 DIAGNOSIS — R01.1 SYSTOLIC MURMUR: ICD-10-CM

## 2021-08-09 DIAGNOSIS — I10 HYPERTENSION, UNSPECIFIED TYPE: ICD-10-CM

## 2021-08-09 DIAGNOSIS — F41.9 ANXIETY: ICD-10-CM

## 2021-08-09 DIAGNOSIS — E78.5 HYPERLIPIDEMIA, UNSPECIFIED HYPERLIPIDEMIA TYPE: ICD-10-CM

## 2021-08-09 DIAGNOSIS — E03.9 HYPOTHYROIDISM, UNSPECIFIED TYPE: ICD-10-CM

## 2021-08-09 DIAGNOSIS — E11.65 UNCONTROLLED TYPE 2 DIABETES MELLITUS WITH HYPERGLYCEMIA: Primary | ICD-10-CM

## 2021-08-09 DIAGNOSIS — E11.65 UNCONTROLLED TYPE 2 DIABETES MELLITUS WITH HYPERGLYCEMIA: ICD-10-CM

## 2021-08-09 DIAGNOSIS — K21.9 GASTROESOPHAGEAL REFLUX DISEASE, UNSPECIFIED WHETHER ESOPHAGITIS PRESENT: ICD-10-CM

## 2021-08-09 DIAGNOSIS — F51.01 PRIMARY INSOMNIA: ICD-10-CM

## 2021-08-09 LAB — HBA1C MFR BLD: 6.5 %

## 2021-08-09 PROCEDURE — 3077F SYST BP >= 140 MM HG: CPT | Mod: S$GLB,,, | Performed by: NURSE PRACTITIONER

## 2021-08-09 PROCEDURE — 3079F PR MOST RECENT DIASTOLIC BLOOD PRESSURE 80-89 MM HG: ICD-10-PCS | Mod: S$GLB,,, | Performed by: NURSE PRACTITIONER

## 2021-08-09 PROCEDURE — 1101F PT FALLS ASSESS-DOCD LE1/YR: CPT | Mod: S$GLB,,, | Performed by: NURSE PRACTITIONER

## 2021-08-09 PROCEDURE — 3288F PR FALLS RISK ASSESSMENT DOCUMENTED: ICD-10-PCS | Mod: S$GLB,,, | Performed by: NURSE PRACTITIONER

## 2021-08-09 PROCEDURE — 3008F PR BODY MASS INDEX (BMI) DOCUMENTED: ICD-10-PCS | Mod: S$GLB,,, | Performed by: NURSE PRACTITIONER

## 2021-08-09 PROCEDURE — 99214 PR OFFICE/OUTPT VISIT, EST, LEVL IV, 30-39 MIN: ICD-10-PCS | Mod: S$GLB,,, | Performed by: NURSE PRACTITIONER

## 2021-08-09 PROCEDURE — 83036 POCT HEMOGLOBIN A1C: ICD-10-PCS | Mod: QW,,, | Performed by: NURSE PRACTITIONER

## 2021-08-09 PROCEDURE — 83036 HEMOGLOBIN GLYCOSYLATED A1C: CPT | Mod: QW,,, | Performed by: NURSE PRACTITIONER

## 2021-08-09 PROCEDURE — 3044F HG A1C LEVEL LT 7.0%: CPT | Mod: S$GLB,,, | Performed by: NURSE PRACTITIONER

## 2021-08-09 PROCEDURE — 1101F PR PT FALLS ASSESS DOC 0-1 FALLS W/OUT INJ PAST YR: ICD-10-PCS | Mod: S$GLB,,, | Performed by: NURSE PRACTITIONER

## 2021-08-09 PROCEDURE — 3288F FALL RISK ASSESSMENT DOCD: CPT | Mod: S$GLB,,, | Performed by: NURSE PRACTITIONER

## 2021-08-09 PROCEDURE — 3044F PR MOST RECENT HEMOGLOBIN A1C LEVEL <7.0%: ICD-10-PCS | Mod: S$GLB,,, | Performed by: NURSE PRACTITIONER

## 2021-08-09 PROCEDURE — 3077F PR MOST RECENT SYSTOLIC BLOOD PRESSURE >= 140 MM HG: ICD-10-PCS | Mod: S$GLB,,, | Performed by: NURSE PRACTITIONER

## 2021-08-09 PROCEDURE — 3008F BODY MASS INDEX DOCD: CPT | Mod: S$GLB,,, | Performed by: NURSE PRACTITIONER

## 2021-08-09 PROCEDURE — 99214 OFFICE O/P EST MOD 30 MIN: CPT | Mod: S$GLB,,, | Performed by: NURSE PRACTITIONER

## 2021-08-09 PROCEDURE — 3079F DIAST BP 80-89 MM HG: CPT | Mod: S$GLB,,, | Performed by: NURSE PRACTITIONER

## 2021-08-09 RX ORDER — METFORMIN HYDROCHLORIDE 500 MG/1
500 TABLET ORAL 2 TIMES DAILY
Qty: 180 TABLET | Refills: 1 | Status: SHIPPED | OUTPATIENT
Start: 2021-08-09 | End: 2021-11-09 | Stop reason: SDUPTHER

## 2021-08-09 RX ORDER — ATORVASTATIN CALCIUM 40 MG/1
40 TABLET, FILM COATED ORAL DAILY
Qty: 90 TABLET | Refills: 1 | Status: SHIPPED | OUTPATIENT
Start: 2021-08-09 | End: 2021-11-09 | Stop reason: SDUPTHER

## 2021-08-09 RX ORDER — ALPRAZOLAM 1 MG/1
1 TABLET ORAL NIGHTLY
Qty: 30 TABLET | Refills: 2 | Status: SHIPPED | OUTPATIENT
Start: 2021-08-09 | End: 2021-09-07 | Stop reason: SDUPTHER

## 2021-08-09 RX ORDER — HYDROCHLOROTHIAZIDE 12.5 MG/1
12.5 TABLET ORAL DAILY
Qty: 90 TABLET | Refills: 1 | Status: SHIPPED | OUTPATIENT
Start: 2021-08-09 | End: 2021-11-09 | Stop reason: SDUPTHER

## 2021-08-09 RX ORDER — TEMAZEPAM 30 MG/1
CAPSULE ORAL
Qty: 90 CAPSULE | Refills: 1 | Status: SHIPPED | OUTPATIENT
Start: 2021-08-09 | End: 2022-05-02

## 2021-08-09 RX ORDER — OMEPRAZOLE 20 MG/1
20 CAPSULE, DELAYED RELEASE ORAL 2 TIMES DAILY
Qty: 90 CAPSULE | Refills: 1 | Status: SHIPPED | OUTPATIENT
Start: 2021-08-09 | End: 2021-11-09 | Stop reason: SDUPTHER

## 2021-08-09 RX ORDER — MELOXICAM 15 MG/1
15 TABLET ORAL DAILY
Qty: 90 TABLET | Refills: 1 | Status: SHIPPED | OUTPATIENT
Start: 2021-08-09 | End: 2021-11-09 | Stop reason: SDUPTHER

## 2021-08-09 RX ORDER — LANCETS 33 GAUGE
1 EACH MISCELLANEOUS
Qty: 100 EACH | Refills: 3 | Status: SHIPPED | OUTPATIENT
Start: 2021-08-09 | End: 2021-11-09 | Stop reason: SDUPTHER

## 2021-08-09 RX ORDER — BENAZEPRIL HYDROCHLORIDE 40 MG/1
40 TABLET ORAL DAILY
Qty: 90 TABLET | Refills: 1 | Status: SHIPPED | OUTPATIENT
Start: 2021-08-09 | End: 2021-11-09 | Stop reason: SDUPTHER

## 2021-08-10 LAB
ALBUMIN SERPL-MCNC: 4.5 G/DL (ref 3.6–5.1)
ALBUMIN/GLOB SERPL: 2 (CALC) (ref 1–2.5)
ALP SERPL-CCNC: 81 U/L (ref 37–153)
ALT SERPL-CCNC: 33 U/L (ref 6–29)
AST SERPL-CCNC: 22 U/L (ref 10–35)
BILIRUB SERPL-MCNC: 0.8 MG/DL (ref 0.2–1.2)
BUN SERPL-MCNC: 20 MG/DL (ref 7–25)
BUN/CREAT SERPL: ABNORMAL (CALC) (ref 6–22)
CALCIUM SERPL-MCNC: 9.9 MG/DL (ref 8.6–10.4)
CHLORIDE SERPL-SCNC: 98 MMOL/L (ref 98–110)
CHOLEST SERPL-MCNC: 255 MG/DL
CHOLEST/HDLC SERPL: 5.7 (CALC)
CO2 SERPL-SCNC: 28 MMOL/L (ref 20–32)
CREAT SERPL-MCNC: 0.87 MG/DL (ref 0.5–0.99)
GLOBULIN SER CALC-MCNC: 2.2 G/DL (CALC) (ref 1.9–3.7)
GLUCOSE SERPL-MCNC: 179 MG/DL (ref 65–99)
HDLC SERPL-MCNC: 45 MG/DL
LDLC SERPL CALC-MCNC: ABNORMAL MG/DL (CALC)
NONHDLC SERPL-MCNC: 210 MG/DL (CALC)
POTASSIUM SERPL-SCNC: 3.8 MMOL/L (ref 3.5–5.3)
PROT SERPL-MCNC: 6.7 G/DL (ref 6.1–8.1)
SODIUM SERPL-SCNC: 136 MMOL/L (ref 135–146)
TRIGL SERPL-MCNC: 414 MG/DL
TSH SERPL-ACNC: 0.51 MIU/L (ref 0.4–4.5)

## 2021-08-12 ENCOUNTER — HOSPITAL ENCOUNTER (OUTPATIENT)
Dept: RADIOLOGY | Facility: HOSPITAL | Age: 71
Discharge: HOME | End: 2021-08-12
Attending: NURSE PRACTITIONER
Payer: MEDICARE

## 2021-08-12 ENCOUNTER — OFFICE VISIT (OUTPATIENT)
Dept: FAMILY MEDICINE | Facility: CLINIC | Age: 71
End: 2021-08-12
Payer: MEDICARE

## 2021-08-12 VITALS
TEMPERATURE: 99 F | BODY MASS INDEX: 27.23 KG/M2 | DIASTOLIC BLOOD PRESSURE: 80 MMHG | WEIGHT: 148 LBS | HEIGHT: 62 IN | SYSTOLIC BLOOD PRESSURE: 158 MMHG

## 2021-08-12 DIAGNOSIS — M79.605 LEG PAIN, ANTERIOR, LEFT: ICD-10-CM

## 2021-08-12 DIAGNOSIS — R05.9 COUGH: ICD-10-CM

## 2021-08-12 DIAGNOSIS — U07.1 COVID-19: Primary | ICD-10-CM

## 2021-08-12 DIAGNOSIS — R51.9 HEAD ACHE: ICD-10-CM

## 2021-08-12 PROCEDURE — 3008F BODY MASS INDEX DOCD: CPT | Mod: S$GLB,,, | Performed by: NURSE PRACTITIONER

## 2021-08-12 PROCEDURE — 3044F PR MOST RECENT HEMOGLOBIN A1C LEVEL <7.0%: ICD-10-PCS | Mod: S$GLB,,, | Performed by: NURSE PRACTITIONER

## 2021-08-12 PROCEDURE — 3077F PR MOST RECENT SYSTOLIC BLOOD PRESSURE >= 140 MM HG: ICD-10-PCS | Mod: S$GLB,,, | Performed by: NURSE PRACTITIONER

## 2021-08-12 PROCEDURE — 3077F SYST BP >= 140 MM HG: CPT | Mod: S$GLB,,, | Performed by: NURSE PRACTITIONER

## 2021-08-12 PROCEDURE — 93971 EXTREMITY STUDY: CPT | Mod: TC,LT

## 2021-08-12 PROCEDURE — U0005 INFEC AGEN DETEC AMPLI PROBE: HCPCS | Performed by: NURSE PRACTITIONER

## 2021-08-12 PROCEDURE — 3044F HG A1C LEVEL LT 7.0%: CPT | Mod: S$GLB,,, | Performed by: NURSE PRACTITIONER

## 2021-08-12 PROCEDURE — U0003 INFECTIOUS AGENT DETECTION BY NUCLEIC ACID (DNA OR RNA); SEVERE ACUTE RESPIRATORY SYNDROME CORONAVIRUS 2 (SARS-COV-2) (CORONAVIRUS DISEASE [COVID-19]), AMPLIFIED PROBE TECHNIQUE, MAKING USE OF HIGH THROUGHPUT TECHNOLOGIES AS DESCRIBED BY CMS-2020-01-R: HCPCS | Performed by: NURSE PRACTITIONER

## 2021-08-12 PROCEDURE — 3079F PR MOST RECENT DIASTOLIC BLOOD PRESSURE 80-89 MM HG: ICD-10-PCS | Mod: S$GLB,,, | Performed by: NURSE PRACTITIONER

## 2021-08-12 PROCEDURE — 99213 PR OFFICE/OUTPT VISIT, EST, LEVL III, 20-29 MIN: ICD-10-PCS | Mod: S$GLB,,, | Performed by: NURSE PRACTITIONER

## 2021-08-12 PROCEDURE — 3008F PR BODY MASS INDEX (BMI) DOCUMENTED: ICD-10-PCS | Mod: S$GLB,,, | Performed by: NURSE PRACTITIONER

## 2021-08-12 PROCEDURE — 3079F DIAST BP 80-89 MM HG: CPT | Mod: S$GLB,,, | Performed by: NURSE PRACTITIONER

## 2021-08-12 PROCEDURE — 99213 OFFICE O/P EST LOW 20 MIN: CPT | Mod: S$GLB,,, | Performed by: NURSE PRACTITIONER

## 2021-08-12 RX ORDER — AZITHROMYCIN 250 MG/1
TABLET, FILM COATED ORAL
Qty: 6 TABLET | Refills: 0 | Status: SHIPPED | OUTPATIENT
Start: 2021-08-12 | End: 2021-08-17

## 2021-08-12 RX ORDER — AZITHROMYCIN 250 MG/1
TABLET, FILM COATED ORAL
Qty: 6 TABLET | Refills: 0 | Status: SHIPPED | OUTPATIENT
Start: 2021-08-12 | End: 2021-08-12

## 2021-08-13 LAB
SARS-COV-2 RNA RESP QL NAA+PROBE: NOT DETECTED
SARS-COV-2- CYCLE NUMBER: -1

## 2021-09-01 ENCOUNTER — TELEPHONE (OUTPATIENT)
Dept: FAMILY MEDICINE | Facility: CLINIC | Age: 71
End: 2021-09-01

## 2021-09-07 ENCOUNTER — TELEPHONE (OUTPATIENT)
Dept: FAMILY MEDICINE | Facility: CLINIC | Age: 71
End: 2021-09-07

## 2021-09-07 DIAGNOSIS — F41.9 ANXIETY: ICD-10-CM

## 2021-09-07 RX ORDER — ALPRAZOLAM 1 MG/1
1 TABLET ORAL NIGHTLY
Qty: 30 TABLET | Refills: 2 | Status: SHIPPED | OUTPATIENT
Start: 2021-09-07 | End: 2021-11-09 | Stop reason: SDUPTHER

## 2021-09-08 ENCOUNTER — TELEPHONE (OUTPATIENT)
Dept: FAMILY MEDICINE | Facility: CLINIC | Age: 71
End: 2021-09-08

## 2021-10-20 ENCOUNTER — CLINICAL SUPPORT (OUTPATIENT)
Dept: FAMILY MEDICINE | Facility: CLINIC | Age: 71
End: 2021-10-20
Payer: MEDICARE

## 2021-10-20 ENCOUNTER — TELEPHONE (OUTPATIENT)
Dept: FAMILY MEDICINE | Facility: CLINIC | Age: 71
End: 2021-10-20

## 2021-10-20 DIAGNOSIS — R39.9 UTI SYMPTOMS: ICD-10-CM

## 2021-10-20 DIAGNOSIS — Z12.31 ENCOUNTER FOR SCREENING MAMMOGRAM FOR MALIGNANT NEOPLASM OF BREAST: Primary | ICD-10-CM

## 2021-10-20 DIAGNOSIS — Z23 NEEDS FLU SHOT: Primary | ICD-10-CM

## 2021-10-20 LAB
BILIRUB UR QL STRIP: NEGATIVE
GLUCOSE UR QL STRIP: NEGATIVE
KETONES UR QL STRIP: NEGATIVE
LEUKOCYTE ESTERASE UR QL STRIP: NEGATIVE
PH, POC UA: 6.5
POC BLOOD, URINE: NEGATIVE
POC NITRATES, URINE: NEGATIVE
PROT UR QL STRIP: NEGATIVE
SP GR UR STRIP: 1.01 (ref 1–1.03)
UROBILINOGEN UR STRIP-ACNC: 0.2 (ref 0.1–1.1)

## 2021-10-20 PROCEDURE — G0008 ADMIN INFLUENZA VIRUS VAC: HCPCS | Mod: S$GLB,,, | Performed by: NURSE PRACTITIONER

## 2021-10-20 PROCEDURE — 90662 FLU VACCINE - QUADRIVALENT - HIGH DOSE (65+) PRESERVATIVE FREE IM: ICD-10-PCS | Mod: S$GLB,,, | Performed by: NURSE PRACTITIONER

## 2021-10-20 PROCEDURE — G0008 FLU VACCINE - QUADRIVALENT - HIGH DOSE (65+) PRESERVATIVE FREE IM: ICD-10-PCS | Mod: S$GLB,,, | Performed by: NURSE PRACTITIONER

## 2021-10-20 PROCEDURE — 90662 IIV NO PRSV INCREASED AG IM: CPT | Mod: S$GLB,,, | Performed by: NURSE PRACTITIONER

## 2021-10-20 PROCEDURE — 81003 POCT URINALYSIS, DIPSTICK, AUTOMATED, W/O SCOPE: ICD-10-PCS | Mod: QW,S$GLB,, | Performed by: NURSE PRACTITIONER

## 2021-10-20 PROCEDURE — 81003 URINALYSIS AUTO W/O SCOPE: CPT | Mod: QW,S$GLB,, | Performed by: NURSE PRACTITIONER

## 2021-10-22 LAB — BACTERIA UR CULT: ABNORMAL

## 2021-10-23 ENCOUNTER — PATIENT MESSAGE (OUTPATIENT)
Dept: FAMILY MEDICINE | Facility: CLINIC | Age: 71
End: 2021-10-23
Payer: MEDICARE

## 2021-10-25 ENCOUNTER — PATIENT MESSAGE (OUTPATIENT)
Dept: FAMILY MEDICINE | Facility: CLINIC | Age: 71
End: 2021-10-25
Payer: MEDICARE

## 2021-10-25 ENCOUNTER — TELEPHONE (OUTPATIENT)
Dept: FAMILY MEDICINE | Facility: CLINIC | Age: 71
End: 2021-10-25
Payer: MEDICARE

## 2021-10-25 DIAGNOSIS — R39.9 UTI SYMPTOMS: Primary | ICD-10-CM

## 2021-10-25 RX ORDER — CIPROFLOXACIN 500 MG/1
500 TABLET ORAL 2 TIMES DAILY
Qty: 10 TABLET | Refills: 0 | Status: SHIPPED | OUTPATIENT
Start: 2021-10-25 | End: 2021-11-09

## 2021-10-29 ENCOUNTER — HOSPITAL ENCOUNTER (OUTPATIENT)
Dept: RADIOLOGY | Facility: HOSPITAL | Age: 71
Discharge: HOME | End: 2021-10-29
Attending: NURSE PRACTITIONER
Payer: MEDICARE

## 2021-10-29 VITALS — BODY MASS INDEX: 27.22 KG/M2 | HEIGHT: 62 IN | WEIGHT: 147.94 LBS

## 2021-10-29 DIAGNOSIS — Z12.31 ENCOUNTER FOR SCREENING MAMMOGRAM FOR MALIGNANT NEOPLASM OF BREAST: ICD-10-CM

## 2021-10-29 PROCEDURE — 77067 SCR MAMMO BI INCL CAD: CPT | Mod: TC,PO

## 2021-11-04 ENCOUNTER — TELEPHONE (OUTPATIENT)
Dept: FAMILY MEDICINE | Facility: CLINIC | Age: 71
End: 2021-11-04
Payer: MEDICARE

## 2021-11-09 ENCOUNTER — OFFICE VISIT (OUTPATIENT)
Dept: FAMILY MEDICINE | Facility: CLINIC | Age: 71
End: 2021-11-09
Payer: MEDICARE

## 2021-11-09 VITALS
HEART RATE: 76 BPM | WEIGHT: 143.19 LBS | DIASTOLIC BLOOD PRESSURE: 74 MMHG | BODY MASS INDEX: 26.35 KG/M2 | SYSTOLIC BLOOD PRESSURE: 128 MMHG | HEIGHT: 62 IN

## 2021-11-09 DIAGNOSIS — E11.65 UNCONTROLLED TYPE 2 DIABETES MELLITUS WITH HYPERGLYCEMIA: Primary | ICD-10-CM

## 2021-11-09 DIAGNOSIS — E11.65 UNCONTROLLED TYPE 2 DIABETES MELLITUS WITH HYPERGLYCEMIA: ICD-10-CM

## 2021-11-09 DIAGNOSIS — F41.9 ANXIETY: ICD-10-CM

## 2021-11-09 DIAGNOSIS — Z20.822 CLOSE EXPOSURE TO COVID-19 VIRUS: ICD-10-CM

## 2021-11-09 DIAGNOSIS — I10 HYPERTENSION, UNSPECIFIED TYPE: ICD-10-CM

## 2021-11-09 DIAGNOSIS — K21.9 GASTROESOPHAGEAL REFLUX DISEASE, UNSPECIFIED WHETHER ESOPHAGITIS PRESENT: ICD-10-CM

## 2021-11-09 DIAGNOSIS — F51.01 PRIMARY INSOMNIA: ICD-10-CM

## 2021-11-09 DIAGNOSIS — G47.00 INSOMNIA, UNSPECIFIED TYPE: ICD-10-CM

## 2021-11-09 DIAGNOSIS — E78.5 HYPERLIPIDEMIA, UNSPECIFIED HYPERLIPIDEMIA TYPE: ICD-10-CM

## 2021-11-09 DIAGNOSIS — E03.9 HYPOTHYROIDISM, UNSPECIFIED TYPE: ICD-10-CM

## 2021-11-09 DIAGNOSIS — Z79.899 HIGH RISK MEDICATION USE: ICD-10-CM

## 2021-11-09 LAB — HBA1C MFR BLD: 6.4 %

## 2021-11-09 PROCEDURE — 3060F PR POS MICROALBUMINURIA RESULT DOCUMENTED/REVIEW: ICD-10-PCS | Mod: S$GLB,,, | Performed by: NURSE PRACTITIONER

## 2021-11-09 PROCEDURE — 4010F PR ACE/ARB THEARPY RXD/TAKEN: ICD-10-PCS | Mod: S$GLB,,, | Performed by: NURSE PRACTITIONER

## 2021-11-09 PROCEDURE — 99214 OFFICE O/P EST MOD 30 MIN: CPT | Mod: S$GLB,,, | Performed by: NURSE PRACTITIONER

## 2021-11-09 PROCEDURE — 83036 HEMOGLOBIN GLYCOSYLATED A1C: CPT | Mod: QW,,, | Performed by: NURSE PRACTITIONER

## 2021-11-09 PROCEDURE — 99214 PR OFFICE/OUTPT VISIT, EST, LEVL IV, 30-39 MIN: ICD-10-PCS | Mod: S$GLB,,, | Performed by: NURSE PRACTITIONER

## 2021-11-09 PROCEDURE — 1159F PR MEDICATION LIST DOCUMENTED IN MEDICAL RECORD: ICD-10-PCS | Mod: S$GLB,,, | Performed by: NURSE PRACTITIONER

## 2021-11-09 PROCEDURE — 3066F NEPHROPATHY DOC TX: CPT | Mod: S$GLB,,, | Performed by: NURSE PRACTITIONER

## 2021-11-09 PROCEDURE — 3008F BODY MASS INDEX DOCD: CPT | Mod: S$GLB,,, | Performed by: NURSE PRACTITIONER

## 2021-11-09 PROCEDURE — 3074F PR MOST RECENT SYSTOLIC BLOOD PRESSURE < 130 MM HG: ICD-10-PCS | Mod: S$GLB,,, | Performed by: NURSE PRACTITIONER

## 2021-11-09 PROCEDURE — 1160F PR REVIEW ALL MEDS BY PRESCRIBER/CLIN PHARMACIST DOCUMENTED: ICD-10-PCS | Mod: S$GLB,,, | Performed by: NURSE PRACTITIONER

## 2021-11-09 PROCEDURE — 3066F PR DOCUMENTATION OF TREATMENT FOR NEPHROPATHY: ICD-10-PCS | Mod: S$GLB,,, | Performed by: NURSE PRACTITIONER

## 2021-11-09 PROCEDURE — 1160F RVW MEDS BY RX/DR IN RCRD: CPT | Mod: S$GLB,,, | Performed by: NURSE PRACTITIONER

## 2021-11-09 PROCEDURE — 4010F ACE/ARB THERAPY RXD/TAKEN: CPT | Mod: S$GLB,,, | Performed by: NURSE PRACTITIONER

## 2021-11-09 PROCEDURE — 3060F POS MICROALBUMINURIA REV: CPT | Mod: S$GLB,,, | Performed by: NURSE PRACTITIONER

## 2021-11-09 PROCEDURE — 3074F SYST BP LT 130 MM HG: CPT | Mod: S$GLB,,, | Performed by: NURSE PRACTITIONER

## 2021-11-09 PROCEDURE — 3008F PR BODY MASS INDEX (BMI) DOCUMENTED: ICD-10-PCS | Mod: S$GLB,,, | Performed by: NURSE PRACTITIONER

## 2021-11-09 PROCEDURE — 3044F PR MOST RECENT HEMOGLOBIN A1C LEVEL <7.0%: ICD-10-PCS | Mod: S$GLB,,, | Performed by: NURSE PRACTITIONER

## 2021-11-09 PROCEDURE — 1159F MED LIST DOCD IN RCRD: CPT | Mod: S$GLB,,, | Performed by: NURSE PRACTITIONER

## 2021-11-09 PROCEDURE — 3078F DIAST BP <80 MM HG: CPT | Mod: S$GLB,,, | Performed by: NURSE PRACTITIONER

## 2021-11-09 PROCEDURE — 83036 POCT HEMOGLOBIN A1C: ICD-10-PCS | Mod: QW,,, | Performed by: NURSE PRACTITIONER

## 2021-11-09 PROCEDURE — 3044F HG A1C LEVEL LT 7.0%: CPT | Mod: S$GLB,,, | Performed by: NURSE PRACTITIONER

## 2021-11-09 PROCEDURE — 3078F PR MOST RECENT DIASTOLIC BLOOD PRESSURE < 80 MM HG: ICD-10-PCS | Mod: S$GLB,,, | Performed by: NURSE PRACTITIONER

## 2021-11-09 RX ORDER — OMEPRAZOLE 20 MG/1
20 CAPSULE, DELAYED RELEASE ORAL 2 TIMES DAILY
Qty: 90 CAPSULE | Refills: 1 | Status: SHIPPED | OUTPATIENT
Start: 2021-11-09 | End: 2022-05-02

## 2021-11-09 RX ORDER — HYDROCHLOROTHIAZIDE 12.5 MG/1
12.5 TABLET ORAL DAILY
Qty: 90 TABLET | Refills: 1 | Status: SHIPPED | OUTPATIENT
Start: 2021-11-09 | End: 2022-02-09 | Stop reason: SDUPTHER

## 2021-11-09 RX ORDER — METFORMIN HYDROCHLORIDE 500 MG/1
500 TABLET ORAL 2 TIMES DAILY
Qty: 180 TABLET | Refills: 1 | Status: SHIPPED | OUTPATIENT
Start: 2021-11-09 | End: 2022-02-09 | Stop reason: SDUPTHER

## 2021-11-09 RX ORDER — ATORVASTATIN CALCIUM 40 MG/1
40 TABLET, FILM COATED ORAL DAILY
Qty: 90 TABLET | Refills: 1 | Status: SHIPPED | OUTPATIENT
Start: 2021-11-09 | End: 2022-02-09 | Stop reason: SDUPTHER

## 2021-11-09 RX ORDER — BENAZEPRIL HYDROCHLORIDE 40 MG/1
40 TABLET ORAL DAILY
Qty: 90 TABLET | Refills: 1 | Status: SHIPPED | OUTPATIENT
Start: 2021-11-09 | End: 2022-02-09 | Stop reason: SDUPTHER

## 2021-11-09 RX ORDER — TRAZODONE HYDROCHLORIDE 50 MG/1
50 TABLET ORAL NIGHTLY
Qty: 30 TABLET | Refills: 1 | Status: SHIPPED | OUTPATIENT
Start: 2021-11-09 | End: 2023-06-27

## 2021-11-09 RX ORDER — LANCETS 33 GAUGE
1 EACH MISCELLANEOUS
Qty: 100 EACH | Refills: 3 | Status: SHIPPED | OUTPATIENT
Start: 2021-11-10

## 2021-11-09 RX ORDER — MELOXICAM 15 MG/1
15 TABLET ORAL DAILY
Qty: 90 TABLET | Refills: 1 | Status: SHIPPED | OUTPATIENT
Start: 2021-11-09 | End: 2022-08-02 | Stop reason: SDUPTHER

## 2021-11-09 RX ORDER — ALPRAZOLAM 1 MG/1
1 TABLET ORAL NIGHTLY
Qty: 30 TABLET | Refills: 2 | Status: SHIPPED | OUTPATIENT
Start: 2021-11-09 | End: 2022-02-09 | Stop reason: SDUPTHER

## 2021-11-10 LAB
ALBUMIN SERPL-MCNC: 4.6 G/DL (ref 3.6–5.1)
ALBUMIN/GLOB SERPL: 2.1 (CALC) (ref 1–2.5)
ALP SERPL-CCNC: 70 U/L (ref 37–153)
ALT SERPL-CCNC: 25 U/L (ref 6–29)
AST SERPL-CCNC: 18 U/L (ref 10–35)
BILIRUB SERPL-MCNC: 0.8 MG/DL (ref 0.2–1.2)
BUN SERPL-MCNC: 19 MG/DL (ref 7–25)
BUN/CREAT SERPL: ABNORMAL (CALC) (ref 6–22)
CALCIUM SERPL-MCNC: 10 MG/DL (ref 8.6–10.4)
CHLORIDE SERPL-SCNC: 102 MMOL/L (ref 98–110)
CHOLEST SERPL-MCNC: 231 MG/DL
CHOLEST/HDLC SERPL: 4.7 (CALC)
CO2 SERPL-SCNC: 30 MMOL/L (ref 20–32)
CREAT SERPL-MCNC: 0.83 MG/DL (ref 0.6–0.93)
GLOBULIN SER CALC-MCNC: 2.2 G/DL (CALC) (ref 1.9–3.7)
GLUCOSE SERPL-MCNC: 140 MG/DL (ref 65–99)
HDLC SERPL-MCNC: 49 MG/DL
LDLC SERPL CALC-MCNC: 134 MG/DL (CALC)
NONHDLC SERPL-MCNC: 182 MG/DL (CALC)
POTASSIUM SERPL-SCNC: 3.9 MMOL/L (ref 3.5–5.3)
PROT SERPL-MCNC: 6.8 G/DL (ref 6.1–8.1)
SARS-COV-2 IGG SERPL IA-ACNC: 5.33 INDEX
SODIUM SERPL-SCNC: 139 MMOL/L (ref 135–146)
TRIGL SERPL-MCNC: 335 MG/DL

## 2021-11-11 ENCOUNTER — TELEPHONE (OUTPATIENT)
Dept: FAMILY MEDICINE | Facility: CLINIC | Age: 71
End: 2021-11-11
Payer: MEDICARE

## 2022-02-09 ENCOUNTER — OFFICE VISIT (OUTPATIENT)
Dept: FAMILY MEDICINE | Facility: CLINIC | Age: 72
End: 2022-02-09
Payer: MEDICARE

## 2022-02-09 VITALS
SYSTOLIC BLOOD PRESSURE: 168 MMHG | WEIGHT: 145 LBS | BODY MASS INDEX: 26.68 KG/M2 | HEART RATE: 68 BPM | HEIGHT: 62 IN | DIASTOLIC BLOOD PRESSURE: 74 MMHG

## 2022-02-09 DIAGNOSIS — Z79.899 HIGH RISK MEDICATION USE: Primary | ICD-10-CM

## 2022-02-09 DIAGNOSIS — F41.9 ANXIETY: ICD-10-CM

## 2022-02-09 DIAGNOSIS — E78.5 HYPERLIPIDEMIA, UNSPECIFIED HYPERLIPIDEMIA TYPE: ICD-10-CM

## 2022-02-09 DIAGNOSIS — R30.0 DYSURIA: ICD-10-CM

## 2022-02-09 DIAGNOSIS — K21.9 GASTROESOPHAGEAL REFLUX DISEASE, UNSPECIFIED WHETHER ESOPHAGITIS PRESENT: ICD-10-CM

## 2022-02-09 DIAGNOSIS — Z00.00 PHYSICAL EXAM: ICD-10-CM

## 2022-02-09 DIAGNOSIS — E11.65 UNCONTROLLED TYPE 2 DIABETES MELLITUS WITH HYPERGLYCEMIA: ICD-10-CM

## 2022-02-09 DIAGNOSIS — I10 HYPERTENSION, UNSPECIFIED TYPE: ICD-10-CM

## 2022-02-09 LAB — HBA1C MFR BLD: 7.4 %

## 2022-02-09 PROCEDURE — 3051F PR MOST RECENT HEMOGLOBIN A1C LEVEL 7.0 - < 8.0%: ICD-10-PCS | Mod: S$GLB,,, | Performed by: NURSE PRACTITIONER

## 2022-02-09 PROCEDURE — 83036 HEMOGLOBIN GLYCOSYLATED A1C: CPT | Mod: QW,,, | Performed by: NURSE PRACTITIONER

## 2022-02-09 PROCEDURE — 1159F PR MEDICATION LIST DOCUMENTED IN MEDICAL RECORD: ICD-10-PCS | Mod: S$GLB,,, | Performed by: NURSE PRACTITIONER

## 2022-02-09 PROCEDURE — 99214 PR OFFICE/OUTPT VISIT, EST, LEVL IV, 30-39 MIN: ICD-10-PCS | Mod: S$GLB,,, | Performed by: NURSE PRACTITIONER

## 2022-02-09 PROCEDURE — 3008F PR BODY MASS INDEX (BMI) DOCUMENTED: ICD-10-PCS | Mod: S$GLB,,, | Performed by: NURSE PRACTITIONER

## 2022-02-09 PROCEDURE — 3008F BODY MASS INDEX DOCD: CPT | Mod: S$GLB,,, | Performed by: NURSE PRACTITIONER

## 2022-02-09 PROCEDURE — 3078F PR MOST RECENT DIASTOLIC BLOOD PRESSURE < 80 MM HG: ICD-10-PCS | Mod: S$GLB,,, | Performed by: NURSE PRACTITIONER

## 2022-02-09 PROCEDURE — 4010F ACE/ARB THERAPY RXD/TAKEN: CPT | Mod: S$GLB,,, | Performed by: NURSE PRACTITIONER

## 2022-02-09 PROCEDURE — 3288F FALL RISK ASSESSMENT DOCD: CPT | Mod: S$GLB,,, | Performed by: NURSE PRACTITIONER

## 2022-02-09 PROCEDURE — 1159F MED LIST DOCD IN RCRD: CPT | Mod: S$GLB,,, | Performed by: NURSE PRACTITIONER

## 2022-02-09 PROCEDURE — 3077F PR MOST RECENT SYSTOLIC BLOOD PRESSURE >= 140 MM HG: ICD-10-PCS | Mod: S$GLB,,, | Performed by: NURSE PRACTITIONER

## 2022-02-09 PROCEDURE — 83036 POCT HEMOGLOBIN A1C: ICD-10-PCS | Mod: QW,,, | Performed by: NURSE PRACTITIONER

## 2022-02-09 PROCEDURE — 1101F PR PT FALLS ASSESS DOC 0-1 FALLS W/OUT INJ PAST YR: ICD-10-PCS | Mod: S$GLB,,, | Performed by: NURSE PRACTITIONER

## 2022-02-09 PROCEDURE — 99214 OFFICE O/P EST MOD 30 MIN: CPT | Mod: S$GLB,,, | Performed by: NURSE PRACTITIONER

## 2022-02-09 PROCEDURE — 3078F DIAST BP <80 MM HG: CPT | Mod: S$GLB,,, | Performed by: NURSE PRACTITIONER

## 2022-02-09 PROCEDURE — 3077F SYST BP >= 140 MM HG: CPT | Mod: S$GLB,,, | Performed by: NURSE PRACTITIONER

## 2022-02-09 PROCEDURE — 3051F HG A1C>EQUAL 7.0%<8.0%: CPT | Mod: S$GLB,,, | Performed by: NURSE PRACTITIONER

## 2022-02-09 PROCEDURE — 3288F PR FALLS RISK ASSESSMENT DOCUMENTED: ICD-10-PCS | Mod: S$GLB,,, | Performed by: NURSE PRACTITIONER

## 2022-02-09 PROCEDURE — 4010F PR ACE/ARB THEARPY RXD/TAKEN: ICD-10-PCS | Mod: S$GLB,,, | Performed by: NURSE PRACTITIONER

## 2022-02-09 PROCEDURE — 1101F PT FALLS ASSESS-DOCD LE1/YR: CPT | Mod: S$GLB,,, | Performed by: NURSE PRACTITIONER

## 2022-02-09 RX ORDER — ATORVASTATIN CALCIUM 40 MG/1
40 TABLET, FILM COATED ORAL DAILY
Qty: 90 TABLET | Refills: 1 | Status: SHIPPED | OUTPATIENT
Start: 2022-02-09 | End: 2022-05-02

## 2022-02-09 RX ORDER — AMLODIPINE BESYLATE 5 MG/1
5 TABLET ORAL DAILY
Qty: 30 TABLET | Refills: 11 | Status: SHIPPED | OUTPATIENT
Start: 2022-02-09 | End: 2022-03-10 | Stop reason: SDUPTHER

## 2022-02-09 RX ORDER — FAMOTIDINE 40 MG/1
40 TABLET, FILM COATED ORAL DAILY
Qty: 30 TABLET | Refills: 11 | Status: SHIPPED | OUTPATIENT
Start: 2022-02-09 | End: 2022-11-14 | Stop reason: SDUPTHER

## 2022-02-09 RX ORDER — HYDROCHLOROTHIAZIDE 12.5 MG/1
12.5 TABLET ORAL DAILY
Qty: 90 TABLET | Refills: 1 | Status: SHIPPED | OUTPATIENT
Start: 2022-02-09 | End: 2022-05-02

## 2022-02-09 RX ORDER — BENAZEPRIL HYDROCHLORIDE 40 MG/1
40 TABLET ORAL DAILY
Qty: 90 TABLET | Refills: 1 | Status: SHIPPED | OUTPATIENT
Start: 2022-02-09 | End: 2022-04-20

## 2022-02-09 RX ORDER — METFORMIN HYDROCHLORIDE 500 MG/1
500 TABLET ORAL 2 TIMES DAILY
Qty: 180 TABLET | Refills: 1 | Status: SHIPPED | OUTPATIENT
Start: 2022-02-09 | End: 2022-08-02 | Stop reason: SDUPTHER

## 2022-02-09 RX ORDER — PANTOPRAZOLE SODIUM 40 MG/1
40 TABLET, DELAYED RELEASE ORAL DAILY
Qty: 30 TABLET | Refills: 11 | Status: SHIPPED | OUTPATIENT
Start: 2022-02-09 | End: 2022-11-14 | Stop reason: SDUPTHER

## 2022-02-09 RX ORDER — ALPRAZOLAM 1 MG/1
1 TABLET ORAL NIGHTLY
Qty: 30 TABLET | Refills: 2 | Status: SHIPPED | OUTPATIENT
Start: 2022-02-09 | End: 2022-05-16 | Stop reason: SDUPTHER

## 2022-02-10 LAB
APPEARANCE UR: CLEAR
BACTERIA #/AREA URNS HPF: NORMAL /HPF
BACTERIA UR CULT: NORMAL
BILIRUB UR QL STRIP: NEGATIVE
COLOR UR: YELLOW
GLUCOSE UR QL STRIP: NEGATIVE
HGB UR QL STRIP: NEGATIVE
HYALINE CASTS #/AREA URNS LPF: NORMAL /LPF
KETONES UR QL STRIP: NEGATIVE
LEUKOCYTE ESTERASE UR QL STRIP: NEGATIVE
NITRITE UR QL STRIP: NEGATIVE
PH UR STRIP: 7 [PH] (ref 5–8)
PROT UR QL STRIP: NEGATIVE
RBC #/AREA URNS HPF: NORMAL /HPF
SP GR UR STRIP: 1.01 (ref 1–1.03)
SQUAMOUS #/AREA URNS HPF: NORMAL /HPF
WBC #/AREA URNS HPF: NORMAL /HPF

## 2022-02-10 RX ORDER — HYDROCODONE BITARTRATE AND ACETAMINOPHEN 10; 325 MG/1; MG/1
1 TABLET ORAL EVERY 8 HOURS PRN
Qty: 30 TABLET | Refills: 0 | Status: SHIPPED | OUTPATIENT
Start: 2022-02-10 | End: 2023-06-27

## 2022-02-14 ENCOUNTER — TELEPHONE (OUTPATIENT)
Dept: FAMILY MEDICINE | Facility: CLINIC | Age: 72
End: 2022-02-14
Payer: MEDICARE

## 2022-02-14 DIAGNOSIS — N39.0 CHRONIC UTI: Primary | ICD-10-CM

## 2022-02-14 NOTE — TELEPHONE ENCOUNTER
----- Message from Barbara Diaz sent at 2/14/2022  8:59 AM CST -----  PA is required for Hydrocodone-acetaminophen  mg. Please advise and if yes what is the diagnosis? Thank you

## 2022-02-14 NOTE — TELEPHONE ENCOUNTER
----- Message from Kaley Deleon sent at 2/14/2022 10:00 AM CST -----  Patient called and stated that she need to speak to Micaela about some issues she is having she stated that she had a UA done and she seen the results and she now think its because of her bladder suspension  she had back in 2009 please give her a call at [phone]

## 2022-02-15 NOTE — PROGRESS NOTES
SUBJECTIVE:    Patient ID: Kay Valencia is a 71 y.o. female.    Chief Complaint: Diabetes (Diabetes//brought bottles//eye exam 20/20//KP)    71-year-old female presents for check up. She is treated for diabetes, htn, oa, anxiety, insomnia, she is taking medications as prescribed. She is not watching her diet.  Her a1c today is 7.4mg/dl. this has increased from her last visit. Last visit hga1c was 6.4. . she does not check bp at home.blood pressure today in office today is elevated. No chest pain. No sob. . Worries about family. Still helping to take care of father. Not Sleeping well.  Gets up a lot during the night. Occasionally has back pain. Takes norco with some improvement. Pain does not radiate.  Due for labs. Has been experiencing urinary pressure. No fever.       Office Visit on 02/09/2022   Component Date Value Ref Range Status    Hemoglobin A1C 02/09/2022 7.4  % Final    Color, UA 02/09/2022 YELLOW  YELLOW Final    Appearance, UA 02/09/2022 CLEAR  CLEAR Final    Specific Gravity, UA 02/09/2022 1.006  1.001 - 1.035 Final    pH, UA 02/09/2022 7.0  5.0 - 8.0 Final    Glucose, UA 02/09/2022 NEGATIVE  NEGATIVE Final    Bilirubin, UA 02/09/2022 NEGATIVE  NEGATIVE Final    Ketones, UA 02/09/2022 NEGATIVE  NEGATIVE Final    Occult Blood UA 02/09/2022 NEGATIVE  NEGATIVE Final    Protein, UA 02/09/2022 NEGATIVE  NEGATIVE Final    Nitrite, UA 02/09/2022 NEGATIVE  NEGATIVE Final    Leukocytes, UA 02/09/2022 NEGATIVE  NEGATIVE Final    WBC Casts, UA 02/09/2022 NONE SEEN  < OR = 5 /HPF Final    RBC Casts, UA 02/09/2022 NONE SEEN  < OR = 2 /HPF Final    Squam Epithel, UA 02/09/2022 NONE SEEN  < OR = 5 /HPF Final    Bacteria, UA 02/09/2022 NONE SEEN  NONE SEEN /HPF Final    Hyaline Casts, UA 02/09/2022 NONE SEEN  NONE SEEN /LPF Final    Reflexive Urine Culture 02/09/2022    Final   Office Visit on 11/09/2021   Component Date Value Ref Range Status    Hemoglobin A1C 11/09/2021 6.4  % Final    SARS  COV 2 AB (IGG) SPIKE, SEMI QN 11/09/2021 5.33* <1.00 index Final    Glucose 11/09/2021 140* 65 - 99 mg/dL Final    BUN 11/09/2021 19  7 - 25 mg/dL Final    Creatinine 11/09/2021 0.83  0.60 - 0.93 mg/dL Final    eGFR if non  11/09/2021 71  > OR = 60 mL/min/1.73m2 Final    eGFR if  11/09/2021 83  > OR = 60 mL/min/1.73m2 Final    BUN/Creatinine Ratio 11/09/2021 NOT APPLICABLE  6 - 22 (calc) Final    Sodium 11/09/2021 139  135 - 146 mmol/L Final    Potassium 11/09/2021 3.9  3.5 - 5.3 mmol/L Final    Chloride 11/09/2021 102  98 - 110 mmol/L Final    CO2 11/09/2021 30  20 - 32 mmol/L Final    Calcium 11/09/2021 10.0  8.6 - 10.4 mg/dL Final    Total Protein 11/09/2021 6.8  6.1 - 8.1 g/dL Final    Albumin 11/09/2021 4.6  3.6 - 5.1 g/dL Final    Globulin, Total 11/09/2021 2.2  1.9 - 3.7 g/dL (calc) Final    Albumin/Globulin Ratio 11/09/2021 2.1  1.0 - 2.5 (calc) Final    Total Bilirubin 11/09/2021 0.8  0.2 - 1.2 mg/dL Final    Alkaline Phosphatase 11/09/2021 70  37 - 153 U/L Final    AST 11/09/2021 18  10 - 35 U/L Final    ALT 11/09/2021 25  6 - 29 U/L Final    Cholesterol 11/09/2021 231* <200 mg/dL Final    HDL 11/09/2021 49* > OR = 50 mg/dL Final    Triglycerides 11/09/2021 335* <150 mg/dL Final    LDL Cholesterol 11/09/2021 134* mg/dL (calc) Final    HDL/Cholesterol Ratio 11/09/2021 4.7  <5.0 (calc) Final    Non HDL Chol. (LDL+VLDL) 11/09/2021 182* <130 mg/dL (calc) Final   Orders Only on 10/20/2021   Component Date Value Ref Range Status    Urine Culture, Routine 10/20/2021 *  Final   Clinical Support on 10/20/2021   Component Date Value Ref Range Status    POC Blood, Urine 10/20/2021 Negative  Negative Final    POC Bilirubin, Urine 10/20/2021 Negative  Negative Final    POC Urobilinogen, Urine 10/20/2021 0.2  0.1 - 1.1 Final    POC Ketones, Urine 10/20/2021 Negative  Negative Final    POC Protein, Urine 10/20/2021 Negative  Negative Final    POC Nitrates,  Urine 10/20/2021 Negative  Negative Final    POC Glucose, Urine 10/20/2021 Negative  Negative Final    pH, UA 10/20/2021 6.5   Final    POC Specific Gravity, Urine 10/20/2021 1.010  1.003 - 1.029 Final    POC Leukocytes, Urine 10/20/2021 Negative  Negative Final   Office Visit on 08/12/2021   Component Date Value Ref Range Status    SARS-CoV2 (COVID-19) Qualitative P* 08/12/2021 Not Detected  Not Detected Final    SARS-COV-2- Cycle Number 08/12/2021 -1.00  Not Detected Final       Past Medical History:   Diagnosis Date    Anxiety     Depression     Diabetes mellitus, type 2     GERD (gastroesophageal reflux disease)     Hyperlipidemia     Hypertension      Social History     Socioeconomic History    Marital status:    Tobacco Use    Smoking status: Former Smoker    Smokeless tobacco: Never Used   Substance and Sexual Activity    Alcohol use: No    Drug use: No    Sexual activity: Yes     Past Surgical History:   Procedure Laterality Date    BREAST BIOPSY Left 1990'S    BENIGN    HYSTERECTOMY      INCONTINENCE SURGERY       Family History   Problem Relation Age of Onset    Diabetes Maternal Grandmother     Hypertension Maternal Grandmother     Diabetes Father     Breast cancer Mother 57    Hypertension Mother     Diabetes Sister     Ovarian cancer Neg Hx        Review of patient's allergies indicates:   Allergen Reactions    Flexeril [cyclobenzaprine] Other (See Comments)     Unknown was a long time ago.    Keflex [cephalexin] Diarrhea    Sulfa (sulfonamide antibiotics) Nausea Only    Augmentin [amoxicillin-pot clavulanate] Other (See Comments)     Gas       Current Outpatient Medications:     ACCU-CHEK DAYA PLUS METER Misc, , Disp: , Rfl:     blood sugar diagnostic Strp, 1 strip by Misc.(Non-Drug; Combo Route) route once daily., Disp: 100 each, Rfl: 2    calcium carbonate/vitamin D3 (CALTRATE 600 PLUS D ORAL), Caltrate 600 plus D, Disp: , Rfl:     hydrocortisone 1 %  ointment, Apply topically 2 (two) times daily., Disp: 57 g, Rfl: 0    Lactobacillus rhamnosus GG (CULTURELLE) 10 billion cell capsule, Take 1 capsule by mouth once daily., Disp: , Rfl:     lancets 33 gauge Misc, 1 lancet by Misc.(Non-Drug; Combo Route) route 3 (three) times a week., Disp: 100 each, Rfl: 3    melatonin 5 mg Cap, Take by mouth., Disp: , Rfl:     meloxicam (MOBIC) 15 MG tablet, Take 1 tablet (15 mg total) by mouth once daily., Disp: 90 tablet, Rfl: 1    omega 3-dha-epa-fish oil 100-160-1,000 mg Cap, Fish Oil, Disp: , Rfl:     omeprazole (PRILOSEC) 20 MG capsule, Take 1 capsule (20 mg total) by mouth 2 (two) times a day., Disp: 90 capsule, Rfl: 1    temazepam (RESTORIL) 30 mg capsule, TK ONE C PO QHS PRF INSOMNIA, Disp: 90 capsule, Rfl: 1    ALPRAZolam (XANAX) 1 MG tablet, Take 1 tablet (1 mg total) by mouth every evening., Disp: 30 tablet, Rfl: 2    amLODIPine (NORVASC) 5 MG tablet, Take 1 tablet (5 mg total) by mouth once daily., Disp: 30 tablet, Rfl: 11    atorvastatin (LIPITOR) 40 MG tablet, Take 1 tablet (40 mg total) by mouth once daily., Disp: 90 tablet, Rfl: 1    benazepriL (LOTENSIN) 40 MG tablet, Take 1 tablet (40 mg total) by mouth once daily., Disp: 90 tablet, Rfl: 1    famotidine (PEPCID) 40 MG tablet, Take 1 tablet (40 mg total) by mouth once daily., Disp: 30 tablet, Rfl: 11    hydroCHLOROthiazide (HYDRODIURIL) 12.5 MG Tab, Take 1 tablet (12.5 mg total) by mouth once daily., Disp: 90 tablet, Rfl: 1    HYDROcodone-acetaminophen (NORCO)  mg per tablet, Take 1 tablet by mouth every 8 (eight) hours as needed for Pain., Disp: 30 tablet, Rfl: 0    metFORMIN (GLUCOPHAGE) 500 MG tablet, Take 1 tablet (500 mg total) by mouth 2 (two) times daily., Disp: 180 tablet, Rfl: 1    pantoprazole (PROTONIX) 40 MG tablet, Take 1 tablet (40 mg total) by mouth once daily., Disp: 30 tablet, Rfl: 11    traZODone (DESYREL) 50 MG tablet, Take 1 tablet (50 mg total) by mouth every evening.,  "Disp: 30 tablet, Rfl: 1    Review of Systems   Constitutional: Negative for chills, fever and unexpected weight change.   HENT: Negative for ear pain, rhinorrhea and sore throat.    Eyes: Negative for pain and visual disturbance.   Respiratory: Negative for cough and shortness of breath.    Cardiovascular: Negative for chest pain, palpitations and leg swelling.   Gastrointestinal: Negative for abdominal pain, diarrhea, nausea and vomiting.   Genitourinary: Negative for difficulty urinating, hematuria and vaginal bleeding.   Musculoskeletal: Positive for back pain. Negative for arthralgias.   Skin: Negative for rash.   Neurological: Negative for dizziness, weakness and headaches.   Psychiatric/Behavioral: Negative for agitation and sleep disturbance. The patient is not nervous/anxious.           Objective:      Vitals:    02/09/22 1319 02/09/22 1339   BP: 138/82 (!) 168/74   Pulse: 68    Weight: 65.8 kg (145 lb)    Height: 5' 2" (1.575 m)      Physical Exam  Constitutional:       Appearance: She is well-developed and well-nourished.   HENT:      Right Ear: External ear normal.      Left Ear: External ear normal.      Mouth/Throat:      Mouth: Oropharynx is clear and moist.   Eyes:      Conjunctiva/sclera: Conjunctivae normal.      Pupils: Pupils are equal, round, and reactive to light.   Neck:      Vascular: No JVD.   Cardiovascular:      Rate and Rhythm: Normal rate and regular rhythm.      Heart sounds: No murmur heard.      Pulmonary:      Effort: Pulmonary effort is normal.      Breath sounds: Normal breath sounds.   Abdominal:      General: Bowel sounds are normal.      Palpations: Abdomen is soft.   Musculoskeletal:         General: No deformity or edema.      Cervical back: Normal range of motion and neck supple.      Lumbar back: Decreased range of motion. Negative right straight leg raise test and negative left straight leg raise test.   Feet:      Right foot:      Protective Sensation: 5 sites tested. 5 " sites sensed.      Left foot:      Protective Sensation: 5 sites tested. 5 sites sensed.   Lymphadenopathy:      Cervical: No cervical adenopathy.   Skin:     General: Skin is warm, dry and intact.      Findings: No rash.   Neurological:      Mental Status: She is alert and oriented to person, place, and time.      Gait: Gait normal.   Psychiatric:         Mood and Affect: Mood and affect normal.         Speech: Speech normal.         Behavior: Behavior normal.           Assessment:       1. High risk medication use    2. Anxiety    3. Hyperlipidemia, unspecified hyperlipidemia type    4. Hypertension, unspecified type    5. Uncontrolled type 2 diabetes mellitus with hyperglycemia    6. Gastroesophageal reflux disease, unspecified whether esophagitis present    7. Dysuria    8. Physical exam         Plan:       High risk medication use    Anxiety  Comments:  continue xanax prn.   Orders:  -     ALPRAZolam (XANAX) 1 MG tablet; Take 1 tablet (1 mg total) by mouth every evening.  Dispense: 30 tablet; Refill: 2    Hyperlipidemia, unspecified hyperlipidemia type  -     atorvastatin (LIPITOR) 40 MG tablet; Take 1 tablet (40 mg total) by mouth once daily.  Dispense: 90 tablet; Refill: 1    Hypertension, unspecified type  Comments:  add norvasc readings to office  Orders:  -     benazepriL (LOTENSIN) 40 MG tablet; Take 1 tablet (40 mg total) by mouth once daily.  Dispense: 90 tablet; Refill: 1  -     hydroCHLOROthiazide (HYDRODIURIL) 12.5 MG Tab; Take 1 tablet (12.5 mg total) by mouth once daily.  Dispense: 90 tablet; Refill: 1  -     amLODIPine (NORVASC) 5 MG tablet; Take 1 tablet (5 mg total) by mouth once daily.  Dispense: 30 tablet; Refill: 11    Uncontrolled type 2 diabetes mellitus with hyperglycemia  Comments:  start diet. increase activity  Orders:  -     metFORMIN (GLUCOPHAGE) 500 MG tablet; Take 1 tablet (500 mg total) by mouth 2 (two) times daily.  Dispense: 180 tablet; Refill: 1  -     POCT HEMOGLOBIN A1C  -      Foot Exam Performed    Gastroesophageal reflux disease, unspecified whether esophagitis present  -     pantoprazole (PROTONIX) 40 MG tablet; Take 1 tablet (40 mg total) by mouth once daily.  Dispense: 30 tablet; Refill: 11  -     famotidine (PEPCID) 40 MG tablet; Take 1 tablet (40 mg total) by mouth once daily.  Dispense: 30 tablet; Refill: 11    Dysuria  -     Urinalysis, Reflex to Urine Culture Urine, Clean Catch; Future; Expected date: 02/09/2022    Physical exam      Follow up in about 2 weeks (around 2/23/2022), or if symptoms worsen or fail to improve, for medication management.        2/15/2022 Karolina Ramirez

## 2022-02-17 ENCOUNTER — OFFICE VISIT (OUTPATIENT)
Dept: UROGYNECOLOGY | Facility: CLINIC | Age: 72
End: 2022-02-17
Payer: MEDICARE

## 2022-02-17 VITALS
DIASTOLIC BLOOD PRESSURE: 73 MMHG | HEART RATE: 75 BPM | BODY MASS INDEX: 26.69 KG/M2 | SYSTOLIC BLOOD PRESSURE: 155 MMHG | WEIGHT: 145.06 LBS | HEIGHT: 62 IN

## 2022-02-17 DIAGNOSIS — N95.2 POSTMENOPAUSE ATROPHIC VAGINITIS: Primary | ICD-10-CM

## 2022-02-17 DIAGNOSIS — N81.6 RECTOCELE: ICD-10-CM

## 2022-02-17 PROCEDURE — 1126F AMNT PAIN NOTED NONE PRSNT: CPT | Mod: CPTII,S$GLB,, | Performed by: OBSTETRICS & GYNECOLOGY

## 2022-02-17 PROCEDURE — 99203 OFFICE O/P NEW LOW 30 MIN: CPT | Mod: S$GLB,,, | Performed by: OBSTETRICS & GYNECOLOGY

## 2022-02-17 PROCEDURE — 3008F PR BODY MASS INDEX (BMI) DOCUMENTED: ICD-10-PCS | Mod: CPTII,S$GLB,, | Performed by: OBSTETRICS & GYNECOLOGY

## 2022-02-17 PROCEDURE — 3077F PR MOST RECENT SYSTOLIC BLOOD PRESSURE >= 140 MM HG: ICD-10-PCS | Mod: CPTII,S$GLB,, | Performed by: OBSTETRICS & GYNECOLOGY

## 2022-02-17 PROCEDURE — 1159F MED LIST DOCD IN RCRD: CPT | Mod: CPTII,S$GLB,, | Performed by: OBSTETRICS & GYNECOLOGY

## 2022-02-17 PROCEDURE — 99999 PR PBB SHADOW E&M-EST. PATIENT-LVL III: ICD-10-PCS | Mod: PBBFAC,,, | Performed by: OBSTETRICS & GYNECOLOGY

## 2022-02-17 PROCEDURE — 1159F PR MEDICATION LIST DOCUMENTED IN MEDICAL RECORD: ICD-10-PCS | Mod: CPTII,S$GLB,, | Performed by: OBSTETRICS & GYNECOLOGY

## 2022-02-17 PROCEDURE — 3008F BODY MASS INDEX DOCD: CPT | Mod: CPTII,S$GLB,, | Performed by: OBSTETRICS & GYNECOLOGY

## 2022-02-17 PROCEDURE — 3077F SYST BP >= 140 MM HG: CPT | Mod: CPTII,S$GLB,, | Performed by: OBSTETRICS & GYNECOLOGY

## 2022-02-17 PROCEDURE — 3078F PR MOST RECENT DIASTOLIC BLOOD PRESSURE < 80 MM HG: ICD-10-PCS | Mod: CPTII,S$GLB,, | Performed by: OBSTETRICS & GYNECOLOGY

## 2022-02-17 PROCEDURE — 3051F HG A1C>EQUAL 7.0%<8.0%: CPT | Mod: CPTII,S$GLB,, | Performed by: OBSTETRICS & GYNECOLOGY

## 2022-02-17 PROCEDURE — 4010F PR ACE/ARB THEARPY RXD/TAKEN: ICD-10-PCS | Mod: CPTII,S$GLB,, | Performed by: OBSTETRICS & GYNECOLOGY

## 2022-02-17 PROCEDURE — 4010F ACE/ARB THERAPY RXD/TAKEN: CPT | Mod: CPTII,S$GLB,, | Performed by: OBSTETRICS & GYNECOLOGY

## 2022-02-17 PROCEDURE — 1126F PR PAIN SEVERITY QUANTIFIED, NO PAIN PRESENT: ICD-10-PCS | Mod: CPTII,S$GLB,, | Performed by: OBSTETRICS & GYNECOLOGY

## 2022-02-17 PROCEDURE — 3051F PR MOST RECENT HEMOGLOBIN A1C LEVEL 7.0 - < 8.0%: ICD-10-PCS | Mod: CPTII,S$GLB,, | Performed by: OBSTETRICS & GYNECOLOGY

## 2022-02-17 PROCEDURE — 3078F DIAST BP <80 MM HG: CPT | Mod: CPTII,S$GLB,, | Performed by: OBSTETRICS & GYNECOLOGY

## 2022-02-17 PROCEDURE — 99999 PR PBB SHADOW E&M-EST. PATIENT-LVL III: CPT | Mod: PBBFAC,,, | Performed by: OBSTETRICS & GYNECOLOGY

## 2022-02-17 PROCEDURE — 99203 PR OFFICE/OUTPT VISIT, NEW, LEVL III, 30-44 MIN: ICD-10-PCS | Mod: S$GLB,,, | Performed by: OBSTETRICS & GYNECOLOGY

## 2022-02-17 RX ORDER — ESTRADIOL 0.1 MG/G
1 CREAM VAGINAL
Qty: 12 G | Refills: 11 | Status: SHIPPED | OUTPATIENT
Start: 2022-02-18 | End: 2023-10-30

## 2022-02-17 NOTE — PROGRESS NOTES
Subjective:      Patient ID: Kay Valencia is a 71 y.o. female.    Chief Complaint:  No chief complaint on file.      History of Present Illness  71-year-old multipara is referred secondary to possible for the drop bladder secondary to pelvic pressure where she presented to her primary care physician she is referred for consultation there are no issues of incontinence          Past Medical History:   Diagnosis Date    Anxiety     Depression     Diabetes mellitus, type 2     GERD (gastroesophageal reflux disease)     Hyperlipidemia     Hypertension        Past Surgical History:   Procedure Laterality Date    BREAST BIOPSY Left     BENIGN    HYSTERECTOMY      INCONTINENCE SURGERY         GYN & OB History  No LMP recorded. Patient has had a hysterectomy.   Date of Last Pap: No result found    OB History    Para Term  AB Living   2 2 2         SAB IAB Ectopic Multiple Live Births                  # Outcome Date GA Lbr Adriano/2nd Weight Sex Delivery Anes PTL Lv   2 Term            1 Term                Health Maintenance       Date Due Completion Date    Hepatitis C Screening Never done ---    TETANUS VACCINE Never done ---    Shingles Vaccine (1 of 2) Never done ---    COVID-19 Vaccine (3 - Booster for Pfizer series) 2021 3/29/2021    Eye Exam 2021    Diabetes Urine Screening 02/10/2022 2/10/2021    Hemoglobin A1c 2022    DEXA SCAN 2022    Mammogram 10/29/2022 10/29/2021    Lipid Panel 2022    Foot Exam 2023    Low Dose Statin 2023    Colorectal Cancer Screening 2027          Family History   Problem Relation Age of Onset    Diabetes Maternal Grandmother     Hypertension Maternal Grandmother     Diabetes Father     Breast cancer Mother 57    Hypertension Mother     Diabetes Sister     Ovarian cancer Neg Hx        Social History     Socioeconomic History    Marital status:  "   Tobacco Use    Smoking status: Former Smoker    Smokeless tobacco: Never Used   Substance and Sexual Activity    Alcohol use: No    Drug use: No    Sexual activity: Yes       Review of Systems  Review of Systems    Pelvic pressure  Objective:   BP (!) 155/73   Pulse 75   Ht 5' 2" (1.575 m)   Wt 65.8 kg (145 lb 1 oz)   BMI 26.53 kg/m²     Physical Exam   Stage II isolated the posterior defect with some apical  Assessment:     1. Postmenopause atrophic vaginitis    2. Rectocele            Plan:     1. Postmenopause atrophic vaginitis    2. Rectocele      Review of anatomy utilizing a 3D model from there I then talked about her assist in relation from there I did offered  Expected management  Conservative therapy  Surgical reconstruction people wish opting for expected management she has great concerns about surgery she will let us know if the pressure gets to point where she needs intervention but she has no interest in pessary at the time but she was a me able to utilizing hormone supplementation transvaginal    There are no Patient Instructions on file for this visit.        "

## 2022-02-18 ENCOUNTER — TELEPHONE (OUTPATIENT)
Dept: UROGYNECOLOGY | Facility: CLINIC | Age: 72
End: 2022-02-18

## 2022-02-18 NOTE — TELEPHONE ENCOUNTER
----- Message from Miguel Nair sent at 2/18/2022  9:15 AM CST -----  Contact: patient  Type: Needs Medical Advice  Who Called:  patient  Symptoms (please be specific):  n/a  How long has patient had these symptoms:  n/a  Pharmacy name and phone #:  n/a  Best Call Back Number: [phone]  Additional Information: patient called in and stated she was seen yesterday 2/17/22 and forgot to ask the following question.    What was causing the pressure in her vagina area?

## 2022-02-18 NOTE — TELEPHONE ENCOUNTER
Spoke with pt. She waned to know why she was having pressure in the vaginal area, I let her know it was due to the bladder falling.

## 2022-03-09 ENCOUNTER — OFFICE VISIT (OUTPATIENT)
Dept: FAMILY MEDICINE | Facility: CLINIC | Age: 72
End: 2022-03-09
Payer: MEDICARE

## 2022-03-09 VITALS
HEIGHT: 62 IN | SYSTOLIC BLOOD PRESSURE: 152 MMHG | WEIGHT: 142 LBS | BODY MASS INDEX: 26.13 KG/M2 | HEART RATE: 72 BPM | DIASTOLIC BLOOD PRESSURE: 76 MMHG

## 2022-03-09 DIAGNOSIS — I10 HYPERTENSION, UNSPECIFIED TYPE: ICD-10-CM

## 2022-03-09 DIAGNOSIS — E03.9 HYPOTHYROIDISM, UNSPECIFIED TYPE: ICD-10-CM

## 2022-03-09 DIAGNOSIS — F51.01 PRIMARY INSOMNIA: ICD-10-CM

## 2022-03-09 DIAGNOSIS — E11.65 UNCONTROLLED TYPE 2 DIABETES MELLITUS WITH HYPERGLYCEMIA: ICD-10-CM

## 2022-03-09 DIAGNOSIS — E78.5 HYPERLIPIDEMIA, UNSPECIFIED HYPERLIPIDEMIA TYPE: Primary | ICD-10-CM

## 2022-03-09 DIAGNOSIS — Z79.899 HIGH RISK MEDICATION USE: ICD-10-CM

## 2022-03-09 DIAGNOSIS — K21.9 GASTROESOPHAGEAL REFLUX DISEASE, UNSPECIFIED WHETHER ESOPHAGITIS PRESENT: ICD-10-CM

## 2022-03-09 DIAGNOSIS — F41.9 ANXIETY: ICD-10-CM

## 2022-03-09 PROCEDURE — 3078F PR MOST RECENT DIASTOLIC BLOOD PRESSURE < 80 MM HG: ICD-10-PCS | Mod: S$GLB,,, | Performed by: NURSE PRACTITIONER

## 2022-03-09 PROCEDURE — 1160F PR REVIEW ALL MEDS BY PRESCRIBER/CLIN PHARMACIST DOCUMENTED: ICD-10-PCS | Mod: S$GLB,,, | Performed by: NURSE PRACTITIONER

## 2022-03-09 PROCEDURE — 3077F PR MOST RECENT SYSTOLIC BLOOD PRESSURE >= 140 MM HG: ICD-10-PCS | Mod: S$GLB,,, | Performed by: NURSE PRACTITIONER

## 2022-03-09 PROCEDURE — 1160F RVW MEDS BY RX/DR IN RCRD: CPT | Mod: S$GLB,,, | Performed by: NURSE PRACTITIONER

## 2022-03-09 PROCEDURE — 1159F MED LIST DOCD IN RCRD: CPT | Mod: S$GLB,,, | Performed by: NURSE PRACTITIONER

## 2022-03-09 PROCEDURE — 3288F PR FALLS RISK ASSESSMENT DOCUMENTED: ICD-10-PCS | Mod: S$GLB,,, | Performed by: NURSE PRACTITIONER

## 2022-03-09 PROCEDURE — 3078F DIAST BP <80 MM HG: CPT | Mod: S$GLB,,, | Performed by: NURSE PRACTITIONER

## 2022-03-09 PROCEDURE — 4010F PR ACE/ARB THEARPY RXD/TAKEN: ICD-10-PCS | Mod: S$GLB,,, | Performed by: NURSE PRACTITIONER

## 2022-03-09 PROCEDURE — 3051F HG A1C>EQUAL 7.0%<8.0%: CPT | Mod: S$GLB,,, | Performed by: NURSE PRACTITIONER

## 2022-03-09 PROCEDURE — 1101F PT FALLS ASSESS-DOCD LE1/YR: CPT | Mod: S$GLB,,, | Performed by: NURSE PRACTITIONER

## 2022-03-09 PROCEDURE — 3008F PR BODY MASS INDEX (BMI) DOCUMENTED: ICD-10-PCS | Mod: S$GLB,,, | Performed by: NURSE PRACTITIONER

## 2022-03-09 PROCEDURE — 4010F ACE/ARB THERAPY RXD/TAKEN: CPT | Mod: S$GLB,,, | Performed by: NURSE PRACTITIONER

## 2022-03-09 PROCEDURE — 3008F BODY MASS INDEX DOCD: CPT | Mod: S$GLB,,, | Performed by: NURSE PRACTITIONER

## 2022-03-09 PROCEDURE — 99214 PR OFFICE/OUTPT VISIT, EST, LEVL IV, 30-39 MIN: ICD-10-PCS | Mod: S$GLB,,, | Performed by: NURSE PRACTITIONER

## 2022-03-09 PROCEDURE — 1159F PR MEDICATION LIST DOCUMENTED IN MEDICAL RECORD: ICD-10-PCS | Mod: S$GLB,,, | Performed by: NURSE PRACTITIONER

## 2022-03-09 PROCEDURE — 3051F PR MOST RECENT HEMOGLOBIN A1C LEVEL 7.0 - < 8.0%: ICD-10-PCS | Mod: S$GLB,,, | Performed by: NURSE PRACTITIONER

## 2022-03-09 PROCEDURE — 99214 OFFICE O/P EST MOD 30 MIN: CPT | Mod: S$GLB,,, | Performed by: NURSE PRACTITIONER

## 2022-03-09 PROCEDURE — 3288F FALL RISK ASSESSMENT DOCD: CPT | Mod: S$GLB,,, | Performed by: NURSE PRACTITIONER

## 2022-03-09 PROCEDURE — 1101F PR PT FALLS ASSESS DOC 0-1 FALLS W/OUT INJ PAST YR: ICD-10-PCS | Mod: S$GLB,,, | Performed by: NURSE PRACTITIONER

## 2022-03-09 PROCEDURE — 3077F SYST BP >= 140 MM HG: CPT | Mod: S$GLB,,, | Performed by: NURSE PRACTITIONER

## 2022-03-09 NOTE — LETTER
[address] [facility] [address]  [place] [address]  Phone: [phone]   [phone]                        MD Armond Fonseca, MD Parrish Murphy, PACORY Morris, CORTNEY Roldan, CORTNEY Ramirez, CORTNEY Lozano PA-C      Date: 03/09/2022        Patient: Kay Valencia  YOB: 1950      Please fax a copy of pt's recent eye exam.        Sincerely,     Andra Gustafson MA

## 2022-03-09 NOTE — PROGRESS NOTES
SUBJECTIVE:    Patient ID: Kay Valencia is a 71 y.o. female.    Chief Complaint: Hypertension (Did not bring bottles, states she does not need refills // Eye exam- January Dr. Aarti mendoza )    71-year-old female presents for follow up. She is treated for diabetes, htn, oa, anxiety, insomnia, she is taking medications as prescribed. At last visit norvasc 5 was added to meds. Today bp is still elevated despite increase.   No chest pain. No sob. . Worries about family. Still helping to take care of father. Not Sleeping well.  Since last visit was seen by dr. De Paz. Diagnosed with atrophic vaginitis and rectocele.       Office Visit on 02/09/2022   Component Date Value Ref Range Status    Hemoglobin A1C 02/09/2022 7.4  % Final    Color, UA 02/09/2022 YELLOW  YELLOW Final    Appearance, UA 02/09/2022 CLEAR  CLEAR Final    Specific Gravity, UA 02/09/2022 1.006  1.001 - 1.035 Final    pH, UA 02/09/2022 7.0  5.0 - 8.0 Final    Glucose, UA 02/09/2022 NEGATIVE  NEGATIVE Final    Bilirubin, UA 02/09/2022 NEGATIVE  NEGATIVE Final    Ketones, UA 02/09/2022 NEGATIVE  NEGATIVE Final    Occult Blood UA 02/09/2022 NEGATIVE  NEGATIVE Final    Protein, UA 02/09/2022 NEGATIVE  NEGATIVE Final    Nitrite, UA 02/09/2022 NEGATIVE  NEGATIVE Final    Leukocytes, UA 02/09/2022 NEGATIVE  NEGATIVE Final    WBC Casts, UA 02/09/2022 NONE SEEN  < OR = 5 /HPF Final    RBC Casts, UA 02/09/2022 NONE SEEN  < OR = 2 /HPF Final    Squam Epithel, UA 02/09/2022 NONE SEEN  < OR = 5 /HPF Final    Bacteria, UA 02/09/2022 NONE SEEN  NONE SEEN /HPF Final    Hyaline Casts, UA 02/09/2022 NONE SEEN  NONE SEEN /LPF Final    Reflexive Urine Culture 02/09/2022    Final   Office Visit on 11/09/2021   Component Date Value Ref Range Status    Hemoglobin A1C 11/09/2021 6.4  % Final    SARS COV 2 AB (IGG) SPIKE, SEMI QN 11/09/2021 5.33 (A) <1.00 index Final    Glucose 11/09/2021 140 (A) 65 - 99 mg/dL Final    BUN 11/09/2021 19  7 - 25  mg/dL Final    Creatinine 11/09/2021 0.83  0.60 - 0.93 mg/dL Final    eGFR if non  11/09/2021 71  > OR = 60 mL/min/1.73m2 Final    eGFR if  11/09/2021 83  > OR = 60 mL/min/1.73m2 Final    BUN/Creatinine Ratio 11/09/2021 NOT APPLICABLE  6 - 22 (calc) Final    Sodium 11/09/2021 139  135 - 146 mmol/L Final    Potassium 11/09/2021 3.9  3.5 - 5.3 mmol/L Final    Chloride 11/09/2021 102  98 - 110 mmol/L Final    CO2 11/09/2021 30  20 - 32 mmol/L Final    Calcium 11/09/2021 10.0  8.6 - 10.4 mg/dL Final    Total Protein 11/09/2021 6.8  6.1 - 8.1 g/dL Final    Albumin 11/09/2021 4.6  3.6 - 5.1 g/dL Final    Globulin, Total 11/09/2021 2.2  1.9 - 3.7 g/dL (calc) Final    Albumin/Globulin Ratio 11/09/2021 2.1  1.0 - 2.5 (calc) Final    Total Bilirubin 11/09/2021 0.8  0.2 - 1.2 mg/dL Final    Alkaline Phosphatase 11/09/2021 70  37 - 153 U/L Final    AST 11/09/2021 18  10 - 35 U/L Final    ALT 11/09/2021 25  6 - 29 U/L Final    Cholesterol 11/09/2021 231 (A) <200 mg/dL Final    HDL 11/09/2021 49 (A) > OR = 50 mg/dL Final    Triglycerides 11/09/2021 335 (A) <150 mg/dL Final    LDL Cholesterol 11/09/2021 134 (A) mg/dL (calc) Final    HDL/Cholesterol Ratio 11/09/2021 4.7  <5.0 (calc) Final    Non HDL Chol. (LDL+VLDL) 11/09/2021 182 (A) <130 mg/dL (calc) Final   Orders Only on 10/20/2021   Component Date Value Ref Range Status    Urine Culture, Routine 10/20/2021  (A)  Final   Clinical Support on 10/20/2021   Component Date Value Ref Range Status    POC Blood, Urine 10/20/2021 Negative  Negative Final    POC Bilirubin, Urine 10/20/2021 Negative  Negative Final    POC Urobilinogen, Urine 10/20/2021 0.2  0.1 - 1.1 Final    POC Ketones, Urine 10/20/2021 Negative  Negative Final    POC Protein, Urine 10/20/2021 Negative  Negative Final    POC Nitrates, Urine 10/20/2021 Negative  Negative Final    POC Glucose, Urine 10/20/2021 Negative  Negative Final    pH, UA 10/20/2021 6.5    Final    POC Specific Gravity, Urine 10/20/2021 1.010  1.003 - 1.029 Final    POC Leukocytes, Urine 10/20/2021 Negative  Negative Final       Past Medical History:   Diagnosis Date    Anxiety     Depression     Diabetes mellitus, type 2     GERD (gastroesophageal reflux disease)     Hyperlipidemia     Hypertension      Social History     Socioeconomic History    Marital status:    Tobacco Use    Smoking status: Former Smoker    Smokeless tobacco: Never Used   Substance and Sexual Activity    Alcohol use: No    Drug use: No    Sexual activity: Yes     Past Surgical History:   Procedure Laterality Date    BREAST BIOPSY Left 1990'S    BENIGN    HYSTERECTOMY      INCONTINENCE SURGERY       Family History   Problem Relation Age of Onset    Diabetes Maternal Grandmother     Hypertension Maternal Grandmother     Diabetes Father     Breast cancer Mother 57    Hypertension Mother     Diabetes Sister     Ovarian cancer Neg Hx        Review of patient's allergies indicates:   Allergen Reactions    Flexeril [cyclobenzaprine] Other (See Comments)     Unknown was a long time ago.    Keflex [cephalexin] Diarrhea    Sulfa (sulfonamide antibiotics) Nausea Only    Augmentin [amoxicillin-pot clavulanate] Other (See Comments)     Gas       Current Outpatient Medications:     ACCU-CHEK DAYA PLUS METER Misc, , Disp: , Rfl:     ALPRAZolam (XANAX) 1 MG tablet, Take 1 tablet (1 mg total) by mouth every evening., Disp: 30 tablet, Rfl: 2    amLODIPine (NORVASC) 5 MG tablet, Take 1 tablet (5 mg total) by mouth 2 (two) times daily., Disp: 180 tablet, Rfl: 1    atorvastatin (LIPITOR) 40 MG tablet, Take 1 tablet (40 mg total) by mouth once daily., Disp: 90 tablet, Rfl: 1    benazepriL (LOTENSIN) 40 MG tablet, Take 1 tablet (40 mg total) by mouth once daily., Disp: 90 tablet, Rfl: 1    blood sugar diagnostic Strp, 1 strip by Misc.(Non-Drug; Combo Route) route once daily., Disp: 100 each, Rfl: 2     calcium carbonate/vitamin D3 (CALTRATE 600 PLUS D ORAL), Caltrate 600 plus D, Disp: , Rfl:     estradioL (ESTRACE) 0.01 % (0.1 mg/gram) vaginal cream, Place 1 g vaginally every Mon, Wed, Fri., Disp: 12 g, Rfl: 11    famotidine (PEPCID) 40 MG tablet, Take 1 tablet (40 mg total) by mouth once daily., Disp: 30 tablet, Rfl: 11    hydroCHLOROthiazide (HYDRODIURIL) 12.5 MG Tab, Take 1 tablet (12.5 mg total) by mouth once daily., Disp: 90 tablet, Rfl: 1    HYDROcodone-acetaminophen (NORCO)  mg per tablet, Take 1 tablet by mouth every 8 (eight) hours as needed for Pain., Disp: 30 tablet, Rfl: 0    hydrocortisone 1 % ointment, Apply topically 2 (two) times daily., Disp: 57 g, Rfl: 0    Lactobacillus rhamnosus GG (CULTURELLE) 10 billion cell capsule, Take 1 capsule by mouth once daily., Disp: , Rfl:     lancets 33 gauge Misc, 1 lancet by Misc.(Non-Drug; Combo Route) route 3 (three) times a week., Disp: 100 each, Rfl: 3    melatonin 5 mg Cap, Take by mouth., Disp: , Rfl:     meloxicam (MOBIC) 15 MG tablet, Take 1 tablet (15 mg total) by mouth once daily., Disp: 90 tablet, Rfl: 1    metFORMIN (GLUCOPHAGE) 500 MG tablet, Take 1 tablet (500 mg total) by mouth 2 (two) times daily., Disp: 180 tablet, Rfl: 1    omega 3-dha-epa-fish oil 100-160-1,000 mg Cap, Fish Oil, Disp: , Rfl:     omeprazole (PRILOSEC) 20 MG capsule, Take 1 capsule (20 mg total) by mouth 2 (two) times a day., Disp: 90 capsule, Rfl: 1    pantoprazole (PROTONIX) 40 MG tablet, Take 1 tablet (40 mg total) by mouth once daily., Disp: 30 tablet, Rfl: 11    temazepam (RESTORIL) 30 mg capsule, TK ONE C PO QHS PRF INSOMNIA, Disp: 90 capsule, Rfl: 1    traZODone (DESYREL) 50 MG tablet, Take 1 tablet (50 mg total) by mouth every evening., Disp: 30 tablet, Rfl: 1    Review of Systems   Constitutional: Negative for chills, fever and unexpected weight change.   HENT: Negative for ear pain, rhinorrhea and sore throat.    Eyes: Negative for pain and visual  "disturbance.   Respiratory: Negative for cough and shortness of breath.    Cardiovascular: Negative for chest pain, palpitations and leg swelling.   Gastrointestinal: Negative for abdominal pain, diarrhea, nausea and vomiting.   Genitourinary: Negative for difficulty urinating, hematuria and vaginal bleeding.   Musculoskeletal: Negative for arthralgias.   Skin: Negative for rash.   Neurological: Negative for dizziness, weakness and headaches.   Psychiatric/Behavioral: Negative for agitation and sleep disturbance. The patient is not nervous/anxious.           Objective:      Vitals:    03/09/22 1249 03/09/22 1251   BP: (!) 148/74 (!) 152/76   Pulse: 72    Weight: 64.4 kg (142 lb)    Height: 5' 2" (1.575 m)      Physical Exam  Vitals and nursing note reviewed.   Constitutional:       General: She is not in acute distress.     Appearance: Normal appearance. She is well-developed. She is not ill-appearing.   HENT:      Right Ear: External ear normal.      Left Ear: External ear normal.   Eyes:      Conjunctiva/sclera: Conjunctivae normal.      Pupils: Pupils are equal, round, and reactive to light.   Neck:      Vascular: No JVD.   Cardiovascular:      Rate and Rhythm: Normal rate and regular rhythm.      Heart sounds: No murmur heard.  Pulmonary:      Effort: Pulmonary effort is normal.      Breath sounds: Normal breath sounds.   Abdominal:      General: Bowel sounds are normal.      Palpations: Abdomen is soft.   Musculoskeletal:         General: No deformity. Normal range of motion.      Cervical back: Normal range of motion and neck supple.   Lymphadenopathy:      Cervical: No cervical adenopathy.   Skin:     General: Skin is warm and dry.      Findings: No rash.   Neurological:      Mental Status: She is alert and oriented to person, place, and time.      Gait: Gait normal.   Psychiatric:         Speech: Speech normal.         Behavior: Behavior normal.           Assessment:       1. Hyperlipidemia, unspecified " hyperlipidemia type    2. Anxiety    3. Hypertension, unspecified type    4. Uncontrolled type 2 diabetes mellitus with hyperglycemia    5. High risk medication use    6. Gastroesophageal reflux disease, unspecified whether esophagitis present    7. Hypothyroidism, unspecified type    8. Primary insomnia    9. Hypertension, unspecified type         Plan:       Hyperlipidemia, unspecified hyperlipidemia type  -     Lipid Panel; Future; Expected date: 03/09/2022    Anxiety  -     CBC Auto Differential; Future; Expected date: 03/09/2022  -     Comprehensive Metabolic Panel; Future; Expected date: 03/09/2022  -     Lipid Panel; Future; Expected date: 03/09/2022  -     TSH w/reflex to FT4; Future; Expected date: 03/09/2022  -     Microalbumin/Creatinine Ratio, Urine; Future; Expected date: 03/09/2022  -     Urinalysis, Reflex to Urine Culture Urine, Clean Catch; Future; Expected date: 03/09/2022    Hypertension, unspecified type  -     CBC Auto Differential; Future; Expected date: 03/09/2022  -     Comprehensive Metabolic Panel; Future; Expected date: 03/09/2022  -     Lipid Panel; Future; Expected date: 03/09/2022  -     TSH w/reflex to FT4; Future; Expected date: 03/09/2022  -     Microalbumin/Creatinine Ratio, Urine; Future; Expected date: 03/09/2022  -     Urinalysis, Reflex to Urine Culture Urine, Clean Catch; Future; Expected date: 03/09/2022  -     amLODIPine (NORVASC) 5 MG tablet; Take 1 tablet (5 mg total) by mouth 2 (two) times daily.  Dispense: 180 tablet; Refill: 1    Uncontrolled type 2 diabetes mellitus with hyperglycemia    High risk medication use  -     CBC Auto Differential; Future; Expected date: 03/09/2022  -     Comprehensive Metabolic Panel; Future; Expected date: 03/09/2022  -     Lipid Panel; Future; Expected date: 03/09/2022  -     TSH w/reflex to FT4; Future; Expected date: 03/09/2022  -     Microalbumin/Creatinine Ratio, Urine; Future; Expected date: 03/09/2022  -     Urinalysis, Reflex to  Urine Culture Urine, Clean Catch; Future; Expected date: 03/09/2022    Gastroesophageal reflux disease, unspecified whether esophagitis present    Hypothyroidism, unspecified type  -     TSH w/reflex to FT4; Future; Expected date: 03/09/2022    Primary insomnia    Hypertension, unspecified type  Comments:  add norvasc bid readings to office  Orders:  -     CBC Auto Differential; Future; Expected date: 03/09/2022  -     Comprehensive Metabolic Panel; Future; Expected date: 03/09/2022  -     Lipid Panel; Future; Expected date: 03/09/2022  -     TSH w/reflex to FT4; Future; Expected date: 03/09/2022  -     Microalbumin/Creatinine Ratio, Urine; Future; Expected date: 03/09/2022  -     Urinalysis, Reflex to Urine Culture Urine, Clean Catch; Future; Expected date: 03/09/2022  -     amLODIPine (NORVASC) 5 MG tablet; Take 1 tablet (5 mg total) by mouth 2 (two) times daily.  Dispense: 180 tablet; Refill: 1      Follow up in about 4 weeks (around 4/6/2022), or if symptoms worsen or fail to improve, for medication management.        3/14/2022 Karolina Ramirez

## 2022-03-10 ENCOUNTER — TELEPHONE (OUTPATIENT)
Dept: FAMILY MEDICINE | Facility: CLINIC | Age: 72
End: 2022-03-10
Payer: MEDICARE

## 2022-03-10 RX ORDER — AMLODIPINE BESYLATE 5 MG/1
5 TABLET ORAL 2 TIMES DAILY
Qty: 180 TABLET | Refills: 1 | Status: SHIPPED | OUTPATIENT
Start: 2022-03-10 | End: 2022-04-20

## 2022-03-10 NOTE — TELEPHONE ENCOUNTER
----- Message from Kaley Deleon sent at 3/10/2022  3:55 PM CST -----  Patient called and stated that she would like to speak to Andra she was seen yesterday and she was told by Karolina that she was going to change the dosage and she need to take a new one at night but the pharmacy told her it was to early to fill please give her a call at [phone]

## 2022-03-10 NOTE — TELEPHONE ENCOUNTER
----- Message from Brittney Saravia sent at 3/10/2022  9:10 AM CST -----  Pt calling said she thought her BP med Amlodipine dosage was being increased and she was getting a new script sent to local [facility] since her pressure has been high.  Pt said nothing has been sent to [facility].  # [phone]

## 2022-03-16 ENCOUNTER — TELEPHONE (OUTPATIENT)
Dept: FAMILY MEDICINE | Facility: CLINIC | Age: 72
End: 2022-03-16
Payer: MEDICARE

## 2022-03-16 NOTE — TELEPHONE ENCOUNTER
Her bp was elevated at last ov. That is why norvasc was added. Can she come in tomorrow. ? For bp check.

## 2022-03-16 NOTE — TELEPHONE ENCOUNTER
----- Message from Kaley Deleon sent at 3/16/2022  8:35 AM CDT -----  Patient called and stated that on Sunday she realized her left foot was swollen and her right foot was a little swollen she did not take her amloipine at night in case that was the cause she would like to know what she should do please give her a call at [phone]

## 2022-03-17 ENCOUNTER — CLINICAL SUPPORT (OUTPATIENT)
Dept: FAMILY MEDICINE | Facility: CLINIC | Age: 72
End: 2022-03-17
Payer: MEDICARE

## 2022-03-17 VITALS — SYSTOLIC BLOOD PRESSURE: 142 MMHG | DIASTOLIC BLOOD PRESSURE: 70 MMHG

## 2022-03-17 NOTE — PROGRESS NOTES
Pt MERCED increase HCTZ to 25mg daily. Elevate foot as much as possible. Come back in 1 week for a NV BP check.

## 2022-03-23 ENCOUNTER — CLINICAL SUPPORT (OUTPATIENT)
Dept: FAMILY MEDICINE | Facility: CLINIC | Age: 72
End: 2022-03-23
Payer: MEDICARE

## 2022-03-23 VITALS — SYSTOLIC BLOOD PRESSURE: 156 MMHG | DIASTOLIC BLOOD PRESSURE: 70 MMHG

## 2022-03-23 RX ORDER — TORSEMIDE 20 MG/1
20 TABLET ORAL DAILY
Qty: 30 TABLET | Refills: 0 | Status: SHIPPED | OUTPATIENT
Start: 2022-03-23 | End: 2022-05-02

## 2022-03-23 NOTE — PROGRESS NOTES
Pt here for BP check only. Let pt sit in room for 5 mins before checking BP. 156/70 . Pt ankle/foot is still slightly swollen. Per MERCED she will add a fluid pill for pt. Needs to come back in 1 week.

## 2022-03-28 ENCOUNTER — TELEPHONE (OUTPATIENT)
Dept: FAMILY MEDICINE | Facility: CLINIC | Age: 72
End: 2022-03-28
Payer: MEDICARE

## 2022-03-28 NOTE — TELEPHONE ENCOUNTER
----- Message from Kaley Deleon sent at 3/28/2022  8:32 AM CDT -----  Patient called and stated that she would like to speak to Micaela she stated that she stop taking her amlodipine  on Saturday because the pill was given her headaches and he feet were swelling please give her a call back at [phone]

## 2022-03-28 NOTE — TELEPHONE ENCOUNTER
----- Message from Brittney Saravia sent at 3/28/2022  2:17 PM CDT -----  Pt calling said she is waiting for a cb from Micaela @ [phone]

## 2022-03-31 ENCOUNTER — OFFICE VISIT (OUTPATIENT)
Dept: FAMILY MEDICINE | Facility: CLINIC | Age: 72
End: 2022-03-31
Payer: MEDICARE

## 2022-03-31 VITALS
BODY MASS INDEX: 25.76 KG/M2 | SYSTOLIC BLOOD PRESSURE: 138 MMHG | HEART RATE: 70 BPM | WEIGHT: 140 LBS | DIASTOLIC BLOOD PRESSURE: 76 MMHG | HEIGHT: 62 IN

## 2022-03-31 DIAGNOSIS — F41.9 ANXIETY: ICD-10-CM

## 2022-03-31 DIAGNOSIS — Z12.31 ENCOUNTER FOR SCREENING MAMMOGRAM FOR BREAST CANCER: Primary | ICD-10-CM

## 2022-03-31 DIAGNOSIS — E78.5 HYPERLIPIDEMIA, UNSPECIFIED HYPERLIPIDEMIA TYPE: ICD-10-CM

## 2022-03-31 DIAGNOSIS — F51.01 PRIMARY INSOMNIA: ICD-10-CM

## 2022-03-31 DIAGNOSIS — Z13.820 ENCOUNTER FOR OSTEOPOROSIS SCREENING IN ASYMPTOMATIC POSTMENOPAUSAL PATIENT: ICD-10-CM

## 2022-03-31 DIAGNOSIS — E11.65 UNCONTROLLED TYPE 2 DIABETES MELLITUS WITH HYPERGLYCEMIA: ICD-10-CM

## 2022-03-31 DIAGNOSIS — G47.00 INSOMNIA, UNSPECIFIED TYPE: ICD-10-CM

## 2022-03-31 DIAGNOSIS — I10 ESSENTIAL HYPERTENSION: ICD-10-CM

## 2022-03-31 DIAGNOSIS — E03.9 HYPOTHYROIDISM, UNSPECIFIED TYPE: ICD-10-CM

## 2022-03-31 DIAGNOSIS — Z78.0 ENCOUNTER FOR OSTEOPOROSIS SCREENING IN ASYMPTOMATIC POSTMENOPAUSAL PATIENT: ICD-10-CM

## 2022-03-31 DIAGNOSIS — I10 HYPERTENSION, UNSPECIFIED TYPE: ICD-10-CM

## 2022-03-31 PROCEDURE — 1160F PR REVIEW ALL MEDS BY PRESCRIBER/CLIN PHARMACIST DOCUMENTED: ICD-10-PCS | Mod: S$GLB,,, | Performed by: NURSE PRACTITIONER

## 2022-03-31 PROCEDURE — 3078F PR MOST RECENT DIASTOLIC BLOOD PRESSURE < 80 MM HG: ICD-10-PCS | Mod: S$GLB,,, | Performed by: NURSE PRACTITIONER

## 2022-03-31 PROCEDURE — 3075F SYST BP GE 130 - 139MM HG: CPT | Mod: S$GLB,,, | Performed by: NURSE PRACTITIONER

## 2022-03-31 PROCEDURE — 3051F PR MOST RECENT HEMOGLOBIN A1C LEVEL 7.0 - < 8.0%: ICD-10-PCS | Mod: S$GLB,,, | Performed by: NURSE PRACTITIONER

## 2022-03-31 PROCEDURE — 1159F PR MEDICATION LIST DOCUMENTED IN MEDICAL RECORD: ICD-10-PCS | Mod: S$GLB,,, | Performed by: NURSE PRACTITIONER

## 2022-03-31 PROCEDURE — 4010F PR ACE/ARB THEARPY RXD/TAKEN: ICD-10-PCS | Mod: S$GLB,,, | Performed by: NURSE PRACTITIONER

## 2022-03-31 PROCEDURE — 3051F HG A1C>EQUAL 7.0%<8.0%: CPT | Mod: S$GLB,,, | Performed by: NURSE PRACTITIONER

## 2022-03-31 PROCEDURE — 1159F MED LIST DOCD IN RCRD: CPT | Mod: S$GLB,,, | Performed by: NURSE PRACTITIONER

## 2022-03-31 PROCEDURE — 3078F DIAST BP <80 MM HG: CPT | Mod: S$GLB,,, | Performed by: NURSE PRACTITIONER

## 2022-03-31 PROCEDURE — 99214 PR OFFICE/OUTPT VISIT, EST, LEVL IV, 30-39 MIN: ICD-10-PCS | Mod: S$GLB,,, | Performed by: NURSE PRACTITIONER

## 2022-03-31 PROCEDURE — 3075F PR MOST RECENT SYSTOLIC BLOOD PRESS GE 130-139MM HG: ICD-10-PCS | Mod: S$GLB,,, | Performed by: NURSE PRACTITIONER

## 2022-03-31 PROCEDURE — 4010F ACE/ARB THERAPY RXD/TAKEN: CPT | Mod: S$GLB,,, | Performed by: NURSE PRACTITIONER

## 2022-03-31 PROCEDURE — 1160F RVW MEDS BY RX/DR IN RCRD: CPT | Mod: S$GLB,,, | Performed by: NURSE PRACTITIONER

## 2022-03-31 PROCEDURE — 3008F PR BODY MASS INDEX (BMI) DOCUMENTED: ICD-10-PCS | Mod: S$GLB,,, | Performed by: NURSE PRACTITIONER

## 2022-03-31 PROCEDURE — 99214 OFFICE O/P EST MOD 30 MIN: CPT | Mod: S$GLB,,, | Performed by: NURSE PRACTITIONER

## 2022-03-31 PROCEDURE — 3008F BODY MASS INDEX DOCD: CPT | Mod: S$GLB,,, | Performed by: NURSE PRACTITIONER

## 2022-04-06 NOTE — PROGRESS NOTES
SUBJECTIVE:    Patient ID: Kay Valencia is a 71 y.o. female.    Chief Complaint: Diabetes (Brought bottles // Dexa, Mammogram  ordered // abc)    71-year-old female presents for follow up. She is treated for diabetes, htn, oa, anxiety, insomnia, she is taking medications as prescribed. At recent visit norvasc  was added to meds. Reports that had to stop due to lower extremity swelling. Since stopping blood pressure has been <130/90.    No chest pain. No sob. .  Still helping to take care of father. Not Sleeping well.        Office Visit on 02/09/2022   Component Date Value Ref Range Status    Hemoglobin A1C 02/09/2022 7.4  % Final    Color, UA 02/09/2022 YELLOW  YELLOW Final    Appearance, UA 02/09/2022 CLEAR  CLEAR Final    Specific Gravity, UA 02/09/2022 1.006  1.001 - 1.035 Final    pH, UA 02/09/2022 7.0  5.0 - 8.0 Final    Glucose, UA 02/09/2022 NEGATIVE  NEGATIVE Final    Bilirubin, UA 02/09/2022 NEGATIVE  NEGATIVE Final    Ketones, UA 02/09/2022 NEGATIVE  NEGATIVE Final    Occult Blood UA 02/09/2022 NEGATIVE  NEGATIVE Final    Protein, UA 02/09/2022 NEGATIVE  NEGATIVE Final    Nitrite, UA 02/09/2022 NEGATIVE  NEGATIVE Final    Leukocytes, UA 02/09/2022 NEGATIVE  NEGATIVE Final    WBC Casts, UA 02/09/2022 NONE SEEN  < OR = 5 /HPF Final    RBC Casts, UA 02/09/2022 NONE SEEN  < OR = 2 /HPF Final    Squam Epithel, UA 02/09/2022 NONE SEEN  < OR = 5 /HPF Final    Bacteria, UA 02/09/2022 NONE SEEN  NONE SEEN /HPF Final    Hyaline Casts, UA 02/09/2022 NONE SEEN  NONE SEEN /LPF Final    Reflexive Urine Culture 02/09/2022    Final   Office Visit on 11/09/2021   Component Date Value Ref Range Status    Hemoglobin A1C 11/09/2021 6.4  % Final    SARS COV 2 AB (IGG) SPIKE, SEMI QN 11/09/2021 5.33 (A) <1.00 index Final    Glucose 11/09/2021 140 (A) 65 - 99 mg/dL Final    BUN 11/09/2021 19  7 - 25 mg/dL Final    Creatinine 11/09/2021 0.83  0.60 - 0.93 mg/dL Final    eGFR if non   11/09/2021 71  > OR = 60 mL/min/1.73m2 Final    eGFR if  11/09/2021 83  > OR = 60 mL/min/1.73m2 Final    BUN/Creatinine Ratio 11/09/2021 NOT APPLICABLE  6 - 22 (calc) Final    Sodium 11/09/2021 139  135 - 146 mmol/L Final    Potassium 11/09/2021 3.9  3.5 - 5.3 mmol/L Final    Chloride 11/09/2021 102  98 - 110 mmol/L Final    CO2 11/09/2021 30  20 - 32 mmol/L Final    Calcium 11/09/2021 10.0  8.6 - 10.4 mg/dL Final    Total Protein 11/09/2021 6.8  6.1 - 8.1 g/dL Final    Albumin 11/09/2021 4.6  3.6 - 5.1 g/dL Final    Globulin, Total 11/09/2021 2.2  1.9 - 3.7 g/dL (calc) Final    Albumin/Globulin Ratio 11/09/2021 2.1  1.0 - 2.5 (calc) Final    Total Bilirubin 11/09/2021 0.8  0.2 - 1.2 mg/dL Final    Alkaline Phosphatase 11/09/2021 70  37 - 153 U/L Final    AST 11/09/2021 18  10 - 35 U/L Final    ALT 11/09/2021 25  6 - 29 U/L Final    Cholesterol 11/09/2021 231 (A) <200 mg/dL Final    HDL 11/09/2021 49 (A) > OR = 50 mg/dL Final    Triglycerides 11/09/2021 335 (A) <150 mg/dL Final    LDL Cholesterol 11/09/2021 134 (A) mg/dL (calc) Final    HDL/Cholesterol Ratio 11/09/2021 4.7  <5.0 (calc) Final    Non HDL Chol. (LDL+VLDL) 11/09/2021 182 (A) <130 mg/dL (calc) Final   Orders Only on 10/20/2021   Component Date Value Ref Range Status    Urine Culture, Routine 10/20/2021  (A)  Final   Clinical Support on 10/20/2021   Component Date Value Ref Range Status    POC Blood, Urine 10/20/2021 Negative  Negative Final    POC Bilirubin, Urine 10/20/2021 Negative  Negative Final    POC Urobilinogen, Urine 10/20/2021 0.2  0.1 - 1.1 Final    POC Ketones, Urine 10/20/2021 Negative  Negative Final    POC Protein, Urine 10/20/2021 Negative  Negative Final    POC Nitrates, Urine 10/20/2021 Negative  Negative Final    POC Glucose, Urine 10/20/2021 Negative  Negative Final    pH, UA 10/20/2021 6.5   Final    POC Specific Gravity, Urine 10/20/2021 1.010  1.003 - 1.029 Final    POC Leukocytes,  Urine 10/20/2021 Negative  Negative Final       Past Medical History:   Diagnosis Date    Anxiety     Depression     Diabetes mellitus, type 2     GERD (gastroesophageal reflux disease)     Hyperlipidemia     Hypertension      Social History     Socioeconomic History    Marital status:    Tobacco Use    Smoking status: Former Smoker    Smokeless tobacco: Never Used   Substance and Sexual Activity    Alcohol use: No    Drug use: No    Sexual activity: Yes     Past Surgical History:   Procedure Laterality Date    BREAST BIOPSY Left 1990'S    BENIGN    HYSTERECTOMY      INCONTINENCE SURGERY       Family History   Problem Relation Age of Onset    Diabetes Maternal Grandmother     Hypertension Maternal Grandmother     Diabetes Father     Breast cancer Mother 57    Hypertension Mother     Diabetes Sister     Ovarian cancer Neg Hx        Review of patient's allergies indicates:   Allergen Reactions    Flexeril [cyclobenzaprine] Other (See Comments)     Unknown was a long time ago.    Keflex [cephalexin] Diarrhea    Sulfa (sulfonamide antibiotics) Nausea Only    Augmentin [amoxicillin-pot clavulanate] Other (See Comments)     Gas       Current Outpatient Medications:     ACCU-CHEK DAYA PLUS METER Misc, , Disp: , Rfl:     ALPRAZolam (XANAX) 1 MG tablet, Take 1 tablet (1 mg total) by mouth every evening., Disp: 30 tablet, Rfl: 2    amLODIPine (NORVASC) 5 MG tablet, Take 1 tablet (5 mg total) by mouth 2 (two) times daily., Disp: 180 tablet, Rfl: 1    atorvastatin (LIPITOR) 40 MG tablet, Take 1 tablet (40 mg total) by mouth once daily., Disp: 90 tablet, Rfl: 1    benazepriL (LOTENSIN) 40 MG tablet, Take 1 tablet (40 mg total) by mouth once daily., Disp: 90 tablet, Rfl: 1    blood sugar diagnostic Strp, 1 strip by Misc.(Non-Drug; Combo Route) route once daily., Disp: 100 each, Rfl: 2    calcium carbonate/vitamin D3 (CALTRATE 600 PLUS D ORAL), Caltrate 600 plus D, Disp: , Rfl:      estradioL (ESTRACE) 0.01 % (0.1 mg/gram) vaginal cream, Place 1 g vaginally every Mon, Wed, Fri., Disp: 12 g, Rfl: 11    famotidine (PEPCID) 40 MG tablet, Take 1 tablet (40 mg total) by mouth once daily., Disp: 30 tablet, Rfl: 11    hydroCHLOROthiazide (HYDRODIURIL) 12.5 MG Tab, Take 1 tablet (12.5 mg total) by mouth once daily. (Patient taking differently: Take 25 mg by mouth once daily.), Disp: 90 tablet, Rfl: 1    HYDROcodone-acetaminophen (NORCO)  mg per tablet, Take 1 tablet by mouth every 8 (eight) hours as needed for Pain., Disp: 30 tablet, Rfl: 0    hydrocortisone 1 % ointment, Apply topically 2 (two) times daily., Disp: 57 g, Rfl: 0    Lactobacillus rhamnosus GG (CULTURELLE) 10 billion cell capsule, Take 1 capsule by mouth once daily., Disp: , Rfl:     lancets 33 gauge Misc, 1 lancet by Misc.(Non-Drug; Combo Route) route 3 (three) times a week., Disp: 100 each, Rfl: 3    melatonin 5 mg Cap, Take by mouth., Disp: , Rfl:     meloxicam (MOBIC) 15 MG tablet, Take 1 tablet (15 mg total) by mouth once daily., Disp: 90 tablet, Rfl: 1    metFORMIN (GLUCOPHAGE) 500 MG tablet, Take 1 tablet (500 mg total) by mouth 2 (two) times daily., Disp: 180 tablet, Rfl: 1    omega 3-dha-epa-fish oil 100-160-1,000 mg Cap, Fish Oil, Disp: , Rfl:     omeprazole (PRILOSEC) 20 MG capsule, Take 1 capsule (20 mg total) by mouth 2 (two) times a day., Disp: 90 capsule, Rfl: 1    pantoprazole (PROTONIX) 40 MG tablet, Take 1 tablet (40 mg total) by mouth once daily., Disp: 30 tablet, Rfl: 11    temazepam (RESTORIL) 30 mg capsule, TK ONE C PO QHS PRF INSOMNIA, Disp: 90 capsule, Rfl: 1    torsemide (DEMADEX) 20 MG Tab, Take 1 tablet (20 mg total) by mouth once daily., Disp: 30 tablet, Rfl: 0    traZODone (DESYREL) 50 MG tablet, Take 1 tablet (50 mg total) by mouth every evening., Disp: 30 tablet, Rfl: 1    Review of Systems   Constitutional: Negative for chills, fever and unexpected weight change.   HENT: Negative for  "ear pain, rhinorrhea and sore throat.    Eyes: Negative for pain and visual disturbance.   Respiratory: Negative for cough and shortness of breath.    Cardiovascular: Negative for chest pain, palpitations and leg swelling.   Gastrointestinal: Negative for abdominal pain, diarrhea, nausea and vomiting.   Genitourinary: Negative for difficulty urinating, hematuria and vaginal bleeding.   Musculoskeletal: Negative for arthralgias.   Skin: Negative for rash.   Neurological: Negative for dizziness, weakness and headaches.   Psychiatric/Behavioral: Negative for agitation and sleep disturbance. The patient is not nervous/anxious.           Objective:      Vitals:    03/31/22 1053   BP: 138/76   Pulse: 70   Weight: 63.5 kg (140 lb)   Height: 5' 2" (1.575 m)     Physical Exam  Vitals and nursing note reviewed.   Constitutional:       General: She is not in acute distress.     Appearance: She is well-developed. She is not ill-appearing.   HENT:      Head: Normocephalic.   Eyes:      Conjunctiva/sclera: Conjunctivae normal.      Pupils: Pupils are equal, round, and reactive to light.   Neck:      Vascular: No JVD.   Cardiovascular:      Rate and Rhythm: Normal rate and regular rhythm.      Heart sounds: No murmur heard.  Pulmonary:      Effort: Pulmonary effort is normal.      Breath sounds: Normal breath sounds.   Abdominal:      General: Bowel sounds are normal.      Palpations: Abdomen is soft.   Musculoskeletal:         General: No deformity. Normal range of motion.      Cervical back: Normal range of motion and neck supple.   Lymphadenopathy:      Cervical: No cervical adenopathy.   Skin:     General: Skin is warm and dry.      Findings: No rash.   Neurological:      Mental Status: She is alert and oriented to person, place, and time.      Gait: Gait normal.   Psychiatric:         Speech: Speech normal.         Behavior: Behavior normal.           Assessment:       1. Encounter for screening mammogram for breast cancer  "   2. Uncontrolled type 2 diabetes mellitus with hyperglycemia    3. Hyperlipidemia, unspecified hyperlipidemia type    4. Anxiety    5. Hypertension, unspecified type    6. Hypothyroidism, unspecified type    7. Insomnia, unspecified type    8. Encounter for osteoporosis screening in asymptomatic postmenopausal patient    9. Essential hypertension    10. Primary insomnia         Plan:       Encounter for screening mammogram for breast cancer  -     Mammo Digital Screening Bilat; Future; Expected date: 04/05/2022    Uncontrolled type 2 diabetes mellitus with hyperglycemia  -     Hemoglobin A1C, POCT    Hyperlipidemia, unspecified hyperlipidemia type    Anxiety    Hypertension, unspecified type    Hypothyroidism, unspecified type    Insomnia, unspecified type    Encounter for osteoporosis screening in asymptomatic postmenopausal patient  -     DXA Bone Density Spine And Hip; Future; Expected date: 04/05/2022    Essential hypertension    Primary insomnia      Follow up in about 3 months (around 6/30/2022), or if symptoms worsen or fail to improve, for medication management.        4/5/2022 Karolina Ramirez

## 2022-04-14 ENCOUNTER — TELEPHONE (OUTPATIENT)
Dept: FAMILY MEDICINE | Facility: CLINIC | Age: 72
End: 2022-04-14

## 2022-04-14 NOTE — TELEPHONE ENCOUNTER
----- Message from Kaley Deleon sent at 4/14/2022  2:35 PM CDT -----  Patient called and stated that she is on her way to [place] and she left her xanax at home and she would like to know if she can have a few called into [facility] out there patient stated that she is not sure what the phone number or address is she stated that she will give that the nurse when she calls back please give her a call at [phone]

## 2022-04-20 ENCOUNTER — CLINICAL SUPPORT (OUTPATIENT)
Dept: FAMILY MEDICINE | Facility: CLINIC | Age: 72
End: 2022-04-20

## 2022-04-20 VITALS — DIASTOLIC BLOOD PRESSURE: 90 MMHG | SYSTOLIC BLOOD PRESSURE: 164 MMHG

## 2022-04-20 DIAGNOSIS — I10 ESSENTIAL HYPERTENSION: Primary | ICD-10-CM

## 2022-04-20 RX ORDER — OLMESARTAN MEDOXOMIL AND HYDROCHLOROTHIAZIDE 40/12.5 40; 12.5 MG/1; MG/1
1 TABLET ORAL DAILY
Qty: 90 TABLET | Refills: 3 | Status: SHIPPED | OUTPATIENT
Start: 2022-04-20 | End: 2023-06-27

## 2022-04-23 LAB
ALBUMIN SERPL-MCNC: 4.3 G/DL (ref 3.6–5.1)
ALBUMIN/CREAT UR: 34 MCG/MG CREAT
ALBUMIN/GLOB SERPL: 2.2 (CALC) (ref 1–2.5)
ALP SERPL-CCNC: 68 U/L (ref 37–153)
ALT SERPL-CCNC: 24 U/L (ref 6–29)
APPEARANCE UR: CLEAR
AST SERPL-CCNC: 17 U/L (ref 10–35)
BACTERIA #/AREA URNS HPF: ABNORMAL /HPF
BACTERIA UR CULT: ABNORMAL
BACTERIA UR CULT: ABNORMAL
BASOPHILS # BLD AUTO: 60 CELLS/UL (ref 0–200)
BASOPHILS NFR BLD AUTO: 1.7 %
BILIRUB SERPL-MCNC: 0.7 MG/DL (ref 0.2–1.2)
BILIRUB UR QL STRIP: NEGATIVE
BUN SERPL-MCNC: 25 MG/DL (ref 7–25)
BUN/CREAT SERPL: ABNORMAL (CALC) (ref 6–22)
CALCIUM SERPL-MCNC: 9.6 MG/DL (ref 8.6–10.4)
CHLORIDE SERPL-SCNC: 101 MMOL/L (ref 98–110)
CHOLEST SERPL-MCNC: 256 MG/DL
CHOLEST/HDLC SERPL: 5.6 (CALC)
CO2 SERPL-SCNC: 27 MMOL/L (ref 20–32)
COLOR UR: YELLOW
CREAT SERPL-MCNC: 0.92 MG/DL (ref 0.6–0.93)
CREAT UR-MCNC: 71 MG/DL (ref 20–275)
EOSINOPHIL # BLD AUTO: 249 CELLS/UL (ref 15–500)
EOSINOPHIL NFR BLD AUTO: 7.1 %
ERYTHROCYTE [DISTWIDTH] IN BLOOD BY AUTOMATED COUNT: 12.9 % (ref 11–15)
GLOBULIN SER CALC-MCNC: 2 G/DL (CALC) (ref 1.9–3.7)
GLUCOSE SERPL-MCNC: 144 MG/DL (ref 65–99)
GLUCOSE UR QL STRIP: NEGATIVE
HCT VFR BLD AUTO: 35.4 % (ref 35–45)
HDLC SERPL-MCNC: 46 MG/DL
HGB BLD-MCNC: 12.1 G/DL (ref 11.7–15.5)
HGB UR QL STRIP: NEGATIVE
HYALINE CASTS #/AREA URNS LPF: ABNORMAL /LPF
KETONES UR QL STRIP: NEGATIVE
LDLC SERPL CALC-MCNC: 164 MG/DL (CALC)
LEUKOCYTE ESTERASE UR QL STRIP: ABNORMAL
LYMPHOCYTES # BLD AUTO: 711 CELLS/UL (ref 850–3900)
LYMPHOCYTES NFR BLD AUTO: 20.3 %
MCH RBC QN AUTO: 31.4 PG (ref 27–33)
MCHC RBC AUTO-ENTMCNC: 34.2 G/DL (ref 32–36)
MCV RBC AUTO: 91.9 FL (ref 80–100)
MICROALBUMIN UR-MCNC: 2.4 MG/DL
MONOCYTES # BLD AUTO: 336 CELLS/UL (ref 200–950)
MONOCYTES NFR BLD AUTO: 9.6 %
NEUTROPHILS # BLD AUTO: 2146 CELLS/UL (ref 1500–7800)
NEUTROPHILS NFR BLD AUTO: 61.3 %
NITRITE UR QL STRIP: NEGATIVE
NONHDLC SERPL-MCNC: 210 MG/DL (CALC)
PH UR STRIP: 7 [PH] (ref 5–8)
PLATELET # BLD AUTO: 128 THOUSAND/UL (ref 140–400)
PMV BLD REES-ECKER: 10.4 FL (ref 7.5–12.5)
POTASSIUM SERPL-SCNC: 3.8 MMOL/L (ref 3.5–5.3)
PROT SERPL-MCNC: 6.3 G/DL (ref 6.1–8.1)
PROT UR QL STRIP: NEGATIVE
RBC # BLD AUTO: 3.85 MILLION/UL (ref 3.8–5.1)
RBC #/AREA URNS HPF: ABNORMAL /HPF
SODIUM SERPL-SCNC: 137 MMOL/L (ref 135–146)
SP GR UR STRIP: 1.02 (ref 1–1.03)
SQUAMOUS #/AREA URNS HPF: ABNORMAL /HPF
TRIGL SERPL-MCNC: 301 MG/DL
TSH SERPL-ACNC: 0.43 MIU/L (ref 0.4–4.5)
WBC # BLD AUTO: 3.5 THOUSAND/UL (ref 3.8–10.8)
WBC #/AREA URNS HPF: ABNORMAL /HPF

## 2022-04-25 ENCOUNTER — OFFICE VISIT (OUTPATIENT)
Dept: FAMILY MEDICINE | Facility: CLINIC | Age: 72
End: 2022-04-25
Payer: MEDICARE

## 2022-04-25 VITALS
WEIGHT: 141 LBS | SYSTOLIC BLOOD PRESSURE: 136 MMHG | HEIGHT: 61 IN | BODY MASS INDEX: 26.62 KG/M2 | HEART RATE: 80 BPM | DIASTOLIC BLOOD PRESSURE: 80 MMHG

## 2022-04-25 DIAGNOSIS — I10 HYPERTENSION, UNSPECIFIED TYPE: ICD-10-CM

## 2022-04-25 DIAGNOSIS — Z79.899 HIGH RISK MEDICATION USE: ICD-10-CM

## 2022-04-25 DIAGNOSIS — E11.65 UNCONTROLLED TYPE 2 DIABETES MELLITUS WITH HYPERGLYCEMIA: Primary | ICD-10-CM

## 2022-04-25 DIAGNOSIS — E78.5 HYPERLIPIDEMIA, UNSPECIFIED HYPERLIPIDEMIA TYPE: ICD-10-CM

## 2022-04-25 DIAGNOSIS — Z00.00 PHYSICAL EXAM: ICD-10-CM

## 2022-04-25 DIAGNOSIS — F51.01 PRIMARY INSOMNIA: ICD-10-CM

## 2022-04-25 DIAGNOSIS — K21.9 GASTROESOPHAGEAL REFLUX DISEASE, UNSPECIFIED WHETHER ESOPHAGITIS PRESENT: ICD-10-CM

## 2022-04-25 DIAGNOSIS — F41.9 ANXIETY: ICD-10-CM

## 2022-04-25 LAB — HBA1C MFR BLD: 6.7 %

## 2022-04-25 PROCEDURE — 3079F PR MOST RECENT DIASTOLIC BLOOD PRESSURE 80-89 MM HG: ICD-10-PCS | Mod: S$GLB,,, | Performed by: NURSE PRACTITIONER

## 2022-04-25 PROCEDURE — 3066F PR DOCUMENTATION OF TREATMENT FOR NEPHROPATHY: ICD-10-PCS | Mod: S$GLB,,, | Performed by: NURSE PRACTITIONER

## 2022-04-25 PROCEDURE — 83036 HEMOGLOBIN GLYCOSYLATED A1C: CPT | Mod: QW,,, | Performed by: NURSE PRACTITIONER

## 2022-04-25 PROCEDURE — 3288F PR FALLS RISK ASSESSMENT DOCUMENTED: ICD-10-PCS | Mod: S$GLB,,, | Performed by: NURSE PRACTITIONER

## 2022-04-25 PROCEDURE — 99214 OFFICE O/P EST MOD 30 MIN: CPT | Mod: S$GLB,,, | Performed by: NURSE PRACTITIONER

## 2022-04-25 PROCEDURE — 83036 POCT HEMOGLOBIN A1C: ICD-10-PCS | Mod: QW,,, | Performed by: NURSE PRACTITIONER

## 2022-04-25 PROCEDURE — 3008F BODY MASS INDEX DOCD: CPT | Mod: S$GLB,,, | Performed by: NURSE PRACTITIONER

## 2022-04-25 PROCEDURE — 3044F PR MOST RECENT HEMOGLOBIN A1C LEVEL <7.0%: ICD-10-PCS | Mod: S$GLB,,, | Performed by: NURSE PRACTITIONER

## 2022-04-25 PROCEDURE — 3288F FALL RISK ASSESSMENT DOCD: CPT | Mod: S$GLB,,, | Performed by: NURSE PRACTITIONER

## 2022-04-25 PROCEDURE — 1101F PT FALLS ASSESS-DOCD LE1/YR: CPT | Mod: S$GLB,,, | Performed by: NURSE PRACTITIONER

## 2022-04-25 PROCEDURE — 1159F PR MEDICATION LIST DOCUMENTED IN MEDICAL RECORD: ICD-10-PCS | Mod: S$GLB,,, | Performed by: NURSE PRACTITIONER

## 2022-04-25 PROCEDURE — 3060F POS MICROALBUMINURIA REV: CPT | Mod: S$GLB,,, | Performed by: NURSE PRACTITIONER

## 2022-04-25 PROCEDURE — 3075F SYST BP GE 130 - 139MM HG: CPT | Mod: S$GLB,,, | Performed by: NURSE PRACTITIONER

## 2022-04-25 PROCEDURE — 3060F PR POS MICROALBUMINURIA RESULT DOCUMENTED/REVIEW: ICD-10-PCS | Mod: S$GLB,,, | Performed by: NURSE PRACTITIONER

## 2022-04-25 PROCEDURE — 3044F HG A1C LEVEL LT 7.0%: CPT | Mod: S$GLB,,, | Performed by: NURSE PRACTITIONER

## 2022-04-25 PROCEDURE — 3066F NEPHROPATHY DOC TX: CPT | Mod: S$GLB,,, | Performed by: NURSE PRACTITIONER

## 2022-04-25 PROCEDURE — 1159F MED LIST DOCD IN RCRD: CPT | Mod: S$GLB,,, | Performed by: NURSE PRACTITIONER

## 2022-04-25 PROCEDURE — 4010F ACE/ARB THERAPY RXD/TAKEN: CPT | Mod: S$GLB,,, | Performed by: NURSE PRACTITIONER

## 2022-04-25 PROCEDURE — 1101F PR PT FALLS ASSESS DOC 0-1 FALLS W/OUT INJ PAST YR: ICD-10-PCS | Mod: S$GLB,,, | Performed by: NURSE PRACTITIONER

## 2022-04-25 PROCEDURE — 3079F DIAST BP 80-89 MM HG: CPT | Mod: S$GLB,,, | Performed by: NURSE PRACTITIONER

## 2022-04-25 PROCEDURE — 4010F PR ACE/ARB THEARPY RXD/TAKEN: ICD-10-PCS | Mod: S$GLB,,, | Performed by: NURSE PRACTITIONER

## 2022-04-25 PROCEDURE — 3075F PR MOST RECENT SYSTOLIC BLOOD PRESS GE 130-139MM HG: ICD-10-PCS | Mod: S$GLB,,, | Performed by: NURSE PRACTITIONER

## 2022-04-25 PROCEDURE — 3008F PR BODY MASS INDEX (BMI) DOCUMENTED: ICD-10-PCS | Mod: S$GLB,,, | Performed by: NURSE PRACTITIONER

## 2022-04-25 PROCEDURE — 99214 PR OFFICE/OUTPT VISIT, EST, LEVL IV, 30-39 MIN: ICD-10-PCS | Mod: S$GLB,,, | Performed by: NURSE PRACTITIONER

## 2022-04-25 RX ORDER — ALPRAZOLAM 1 MG/1
1 TABLET ORAL NIGHTLY
Qty: 30 TABLET | Refills: 2 | Status: CANCELLED | OUTPATIENT
Start: 2022-04-25

## 2022-04-25 RX ORDER — LANCETS
EACH MISCELLANEOUS
COMMUNITY
Start: 2022-03-11 | End: 2022-08-23

## 2022-05-02 RX ORDER — ATORVASTATIN CALCIUM 40 MG/1
60 TABLET, FILM COATED ORAL DAILY
Qty: 90 TABLET | Refills: 1
Start: 2022-05-02 | End: 2022-08-02 | Stop reason: SDUPTHER

## 2022-05-02 RX ORDER — HYDROCHLOROTHIAZIDE 12.5 MG/1
25 TABLET ORAL DAILY
Start: 2022-05-02 | End: 2022-08-02 | Stop reason: SDUPTHER

## 2022-05-02 NOTE — PROGRESS NOTES
SUBJECTIVE:    Patient ID: Kay Valencia is a 71 y.o. female.    Chief Complaint: Hypertension (Brought bottles, takes norco PRN only, hasn't taken in over 2 days // Dexa - scheduled in may ac), Hyperlipidemia, and Diabetes    71-year-old female presents for check up. She is treated for diabetes, htn, oa, anxiety, insomnia, she is taking medications as prescribed. Although she does admit that she forgot rx when visiting daughter so did go several days without meds.  bp is well controlled in office today. Since stopping norvasc swelling has resolved. Last labs were 4/20. a1c in office today is down to 6.7mg/dl Sleeping ok. Stress has been manageable      Office Visit on 04/25/2022   Component Date Value Ref Range Status    Hemoglobin A1C 04/25/2022 6.7  % Final   Office Visit on 03/09/2022   Component Date Value Ref Range Status    WBC 04/20/2022 3.5 (A) 3.8 - 10.8 Thousand/uL Final    RBC 04/20/2022 3.85  3.80 - 5.10 Million/uL Final    Hemoglobin 04/20/2022 12.1  11.7 - 15.5 g/dL Final    Hematocrit 04/20/2022 35.4  35.0 - 45.0 % Final    MCV 04/20/2022 91.9  80.0 - 100.0 fL Final    MCH 04/20/2022 31.4  27.0 - 33.0 pg Final    MCHC 04/20/2022 34.2  32.0 - 36.0 g/dL Final    RDW 04/20/2022 12.9  11.0 - 15.0 % Final    Platelets 04/20/2022 128 (A) 140 - 400 Thousand/uL Final    MPV 04/20/2022 10.4  7.5 - 12.5 fL Final    Neutrophils, Abs 04/20/2022 2,146  1,500 - 7,800 cells/uL Final    Lymph # 04/20/2022 711 (A) 850 - 3,900 cells/uL Final    Mono # 04/20/2022 336  200 - 950 cells/uL Final    Eos # 04/20/2022 249  15 - 500 cells/uL Final    Baso # 04/20/2022 60  0 - 200 cells/uL Final    Neutrophils Relative 04/20/2022 61.3  % Final    Lymph % 04/20/2022 20.3  % Final    Mono % 04/20/2022 9.6  % Final    Eosinophil % 04/20/2022 7.1  % Final    Basophil % 04/20/2022 1.7  % Final    Glucose 04/20/2022 144 (A) 65 - 99 mg/dL Final    BUN 04/20/2022 25  7 - 25 mg/dL Final    Creatinine  04/20/2022 0.92  0.60 - 0.93 mg/dL Final    eGFR if non  04/20/2022 63  > OR = 60 mL/min/1.73m2 Final    eGFR if  04/20/2022 73  > OR = 60 mL/min/1.73m2 Final    BUN/Creatinine Ratio 04/20/2022 NOT APPLICABLE  6 - 22 (calc) Final    Sodium 04/20/2022 137  135 - 146 mmol/L Final    Potassium 04/20/2022 3.8  3.5 - 5.3 mmol/L Final    Chloride 04/20/2022 101  98 - 110 mmol/L Final    CO2 04/20/2022 27  20 - 32 mmol/L Final    Calcium 04/20/2022 9.6  8.6 - 10.4 mg/dL Final    Total Protein 04/20/2022 6.3  6.1 - 8.1 g/dL Final    Albumin 04/20/2022 4.3  3.6 - 5.1 g/dL Final    Globulin, Total 04/20/2022 2.0  1.9 - 3.7 g/dL (calc) Final    Albumin/Globulin Ratio 04/20/2022 2.2  1.0 - 2.5 (calc) Final    Total Bilirubin 04/20/2022 0.7  0.2 - 1.2 mg/dL Final    Alkaline Phosphatase 04/20/2022 68  37 - 153 U/L Final    AST 04/20/2022 17  10 - 35 U/L Final    ALT 04/20/2022 24  6 - 29 U/L Final    Cholesterol 04/20/2022 256 (A) <200 mg/dL Final    HDL 04/20/2022 46 (A) > OR = 50 mg/dL Final    Triglycerides 04/20/2022 301 (A) <150 mg/dL Final    LDL Cholesterol 04/20/2022 164 (A) mg/dL (calc) Final    HDL/Cholesterol Ratio 04/20/2022 5.6 (A) <5.0 (calc) Final    Non HDL Chol. (LDL+VLDL) 04/20/2022 210 (A) <130 mg/dL (calc) Final    TSH w/reflex to FT4 04/20/2022 0.43  0.40 - 4.50 mIU/L Final    Creatinine, Urine 04/20/2022 71  20 - 275 mg/dL Final    Microalb, Ur 04/20/2022 2.4  See Note: mg/dL Final    Microalb/Creat Ratio 04/20/2022 34 (A) <30 mcg/mg creat Final    Color, UA 04/20/2022 YELLOW  YELLOW Final    Appearance, UA 04/20/2022 CLEAR  CLEAR Final    Specific Gravity, UA 04/20/2022 1.016  1.001 - 1.035 Final    pH, UA 04/20/2022 7.0  5.0 - 8.0 Final    Glucose, UA 04/20/2022 NEGATIVE  NEGATIVE Final    Bilirubin, UA 04/20/2022 NEGATIVE  NEGATIVE Final    Ketones, UA 04/20/2022 NEGATIVE  NEGATIVE Final    Occult Blood UA 04/20/2022 NEGATIVE  NEGATIVE  Final    Protein, UA 04/20/2022 NEGATIVE  NEGATIVE Final    Nitrite, UA 04/20/2022 NEGATIVE  NEGATIVE Final    Leukocytes, UA 04/20/2022 TRACE (A) NEGATIVE Final    WBC Casts, UA 04/20/2022 NONE SEEN  < OR = 5 /HPF Final    RBC Casts, UA 04/20/2022 NONE SEEN  < OR = 2 /HPF Final    Squam Epithel, UA 04/20/2022 6-10 (A) < OR = 5 /HPF Final    Bacteria, UA 04/20/2022 NONE SEEN  NONE SEEN /HPF Final    Hyaline Casts, UA 04/20/2022 NONE SEEN  NONE SEEN /LPF Final    Reflexive Urine Culture 04/20/2022    Final    Urine Culture, Routine 04/20/2022  (A)  Final   Office Visit on 02/09/2022   Component Date Value Ref Range Status    Hemoglobin A1C 02/09/2022 7.4  % Final    Color, UA 02/09/2022 YELLOW  YELLOW Final    Appearance, UA 02/09/2022 CLEAR  CLEAR Final    Specific Gravity, UA 02/09/2022 1.006  1.001 - 1.035 Final    pH, UA 02/09/2022 7.0  5.0 - 8.0 Final    Glucose, UA 02/09/2022 NEGATIVE  NEGATIVE Final    Bilirubin, UA 02/09/2022 NEGATIVE  NEGATIVE Final    Ketones, UA 02/09/2022 NEGATIVE  NEGATIVE Final    Occult Blood UA 02/09/2022 NEGATIVE  NEGATIVE Final    Protein, UA 02/09/2022 NEGATIVE  NEGATIVE Final    Nitrite, UA 02/09/2022 NEGATIVE  NEGATIVE Final    Leukocytes, UA 02/09/2022 NEGATIVE  NEGATIVE Final    WBC Casts, UA 02/09/2022 NONE SEEN  < OR = 5 /HPF Final    RBC Casts, UA 02/09/2022 NONE SEEN  < OR = 2 /HPF Final    Squam Epithel, UA 02/09/2022 NONE SEEN  < OR = 5 /HPF Final    Bacteria, UA 02/09/2022 NONE SEEN  NONE SEEN /HPF Final    Hyaline Casts, UA 02/09/2022 NONE SEEN  NONE SEEN /LPF Final    Reflexive Urine Culture 02/09/2022    Final   Office Visit on 11/09/2021   Component Date Value Ref Range Status    Hemoglobin A1C 11/09/2021 6.4  % Final    SARS COV 2 AB (IGG) SPIKE, SEMI QN 11/09/2021 5.33 (A) <1.00 index Final    Glucose 11/09/2021 140 (A) 65 - 99 mg/dL Final    BUN 11/09/2021 19  7 - 25 mg/dL Final    Creatinine 11/09/2021 0.83  0.60 - 0.93 mg/dL  Final    eGFR if non  11/09/2021 71  > OR = 60 mL/min/1.73m2 Final    eGFR if  11/09/2021 83  > OR = 60 mL/min/1.73m2 Final    BUN/Creatinine Ratio 11/09/2021 NOT APPLICABLE  6 - 22 (calc) Final    Sodium 11/09/2021 139  135 - 146 mmol/L Final    Potassium 11/09/2021 3.9  3.5 - 5.3 mmol/L Final    Chloride 11/09/2021 102  98 - 110 mmol/L Final    CO2 11/09/2021 30  20 - 32 mmol/L Final    Calcium 11/09/2021 10.0  8.6 - 10.4 mg/dL Final    Total Protein 11/09/2021 6.8  6.1 - 8.1 g/dL Final    Albumin 11/09/2021 4.6  3.6 - 5.1 g/dL Final    Globulin, Total 11/09/2021 2.2  1.9 - 3.7 g/dL (calc) Final    Albumin/Globulin Ratio 11/09/2021 2.1  1.0 - 2.5 (calc) Final    Total Bilirubin 11/09/2021 0.8  0.2 - 1.2 mg/dL Final    Alkaline Phosphatase 11/09/2021 70  37 - 153 U/L Final    AST 11/09/2021 18  10 - 35 U/L Final    ALT 11/09/2021 25  6 - 29 U/L Final    Cholesterol 11/09/2021 231 (A) <200 mg/dL Final    HDL 11/09/2021 49 (A) > OR = 50 mg/dL Final    Triglycerides 11/09/2021 335 (A) <150 mg/dL Final    LDL Cholesterol 11/09/2021 134 (A) mg/dL (calc) Final    HDL/Cholesterol Ratio 11/09/2021 4.7  <5.0 (calc) Final    Non HDL Chol. (LDL+VLDL) 11/09/2021 182 (A) <130 mg/dL (calc) Final       Past Medical History:   Diagnosis Date    Anxiety     Depression     Diabetes mellitus, type 2     GERD (gastroesophageal reflux disease)     Hyperlipidemia     Hypertension      Social History     Socioeconomic History    Marital status:    Tobacco Use    Smoking status: Former Smoker    Smokeless tobacco: Never Used   Substance and Sexual Activity    Alcohol use: No    Drug use: No    Sexual activity: Yes     Past Surgical History:   Procedure Laterality Date    BREAST BIOPSY Left 1990'S    BENIGN    HYSTERECTOMY      INCONTINENCE SURGERY       Family History   Problem Relation Age of Onset    Diabetes Maternal Grandmother     Hypertension Maternal  Grandmother     Diabetes Father     Breast cancer Mother 57    Hypertension Mother     Diabetes Sister     Ovarian cancer Neg Hx        Review of patient's allergies indicates:   Allergen Reactions    Flexeril [cyclobenzaprine] Other (See Comments)     Unknown was a long time ago.    Keflex [cephalexin] Diarrhea    Sulfa (sulfonamide antibiotics) Nausea Only    Augmentin [amoxicillin-pot clavulanate] Other (See Comments)     Gas       Current Outpatient Medications:     ACCU-CHEK DAYA PLUS METER Mis, , Disp: , Rfl:     ALPRAZolam (XANAX) 1 MG tablet, Take 1 tablet (1 mg total) by mouth every evening., Disp: 30 tablet, Rfl: 2    blood sugar diagnostic Strp, 1 strip by Misc.(Non-Drug; Combo Route) route once daily., Disp: 100 each, Rfl: 2    calcium carbonate/vitamin D3 (CALTRATE 600 PLUS D ORAL), Caltrate 600 plus D, Disp: , Rfl:     estradioL (ESTRACE) 0.01 % (0.1 mg/gram) vaginal cream, Place 1 g vaginally every Mon, Wed, Fri., Disp: 12 g, Rfl: 11    famotidine (PEPCID) 40 MG tablet, Take 1 tablet (40 mg total) by mouth once daily., Disp: 30 tablet, Rfl: 11    hydrocortisone 1 % ointment, Apply topically 2 (two) times daily., Disp: 57 g, Rfl: 0    lancets 33 gauge Misc, 1 lancet by Misc.(Non-Drug; Combo Route) route 3 (three) times a week., Disp: 100 each, Rfl: 3    melatonin 5 mg Cap, Take by mouth., Disp: , Rfl:     meloxicam (MOBIC) 15 MG tablet, Take 1 tablet (15 mg total) by mouth once daily., Disp: 90 tablet, Rfl: 1    metFORMIN (GLUCOPHAGE) 500 MG tablet, Take 1 tablet (500 mg total) by mouth 2 (two) times daily., Disp: 180 tablet, Rfl: 1    olmesartan-hydrochlorothiazide (BENICAR HCT) 40-12.5 mg Tab, Take 1 tablet by mouth once daily., Disp: 90 tablet, Rfl: 3    omega 3-dha-epa-fish oil 100-160-1,000 mg Cap, Fish Oil, Disp: , Rfl:     pantoprazole (PROTONIX) 40 MG tablet, Take 1 tablet (40 mg total) by mouth once daily., Disp: 30 tablet, Rfl: 11    ACCU-CHEK SOFTCLIX LANCETS Misc,  ", Disp: , Rfl:     atorvastatin (LIPITOR) 40 MG tablet, Take 1.5 tablets (60 mg total) by mouth once daily., Disp: 90 tablet, Rfl: 1    hydroCHLOROthiazide (HYDRODIURIL) 12.5 MG Tab, Take 2 tablets (25 mg total) by mouth once daily., Disp: , Rfl:     HYDROcodone-acetaminophen (NORCO)  mg per tablet, Take 1 tablet by mouth every 8 (eight) hours as needed for Pain., Disp: 30 tablet, Rfl: 0    Lactobacillus rhamnosus GG (CULTURELLE) 10 billion cell capsule, Take 1 capsule by mouth once daily., Disp: , Rfl:     traZODone (DESYREL) 50 MG tablet, Take 1 tablet (50 mg total) by mouth every evening., Disp: 30 tablet, Rfl: 1    Review of Systems   Constitutional: Negative for chills, fever and unexpected weight change.   HENT: Negative for ear pain, rhinorrhea and sore throat.    Eyes: Negative for pain and visual disturbance.   Respiratory: Negative for cough and shortness of breath.    Cardiovascular: Negative for chest pain, palpitations and leg swelling.   Gastrointestinal: Negative for abdominal pain, diarrhea, nausea and vomiting.   Genitourinary: Negative for difficulty urinating, hematuria and vaginal bleeding.   Musculoskeletal: Negative for arthralgias.   Skin: Negative for rash.   Neurological: Negative for dizziness, weakness and headaches.   Psychiatric/Behavioral: Negative for agitation and sleep disturbance. The patient is not nervous/anxious.           Objective:      Vitals:    04/25/22 1101   BP: 136/80   Pulse: 80   Weight: 64 kg (141 lb)   Height: 5' 1" (1.549 m)     Physical Exam  Constitutional:       Appearance: She is well-developed.   HENT:      Head: Normocephalic.      Right Ear: External ear normal.      Left Ear: External ear normal.   Eyes:      Conjunctiva/sclera: Conjunctivae normal.      Pupils: Pupils are equal, round, and reactive to light.   Neck:      Vascular: No JVD.   Cardiovascular:      Rate and Rhythm: Normal rate and regular rhythm.      Heart sounds: No murmur " heard.  Pulmonary:      Effort: Pulmonary effort is normal.      Breath sounds: Normal breath sounds.   Abdominal:      General: Bowel sounds are normal.      Palpations: Abdomen is soft.   Musculoskeletal:         General: No deformity. Normal range of motion.      Cervical back: Normal range of motion and neck supple.   Lymphadenopathy:      Cervical: No cervical adenopathy.   Skin:     General: Skin is warm and dry.      Findings: No rash.   Neurological:      Mental Status: She is alert and oriented to person, place, and time.      Gait: Gait normal.   Psychiatric:         Speech: Speech normal.         Behavior: Behavior normal.           Assessment:       1. Uncontrolled type 2 diabetes mellitus with hyperglycemia    2. Anxiety    3. Hypertension, unspecified type    4. Primary insomnia    5. Hyperlipidemia, unspecified hyperlipidemia type    6. High risk medication use    7. Gastroesophageal reflux disease, unspecified whether esophagitis present    8. Physical exam    9. Hypertension, unspecified type         Plan:       Uncontrolled type 2 diabetes mellitus with hyperglycemia  -     POCT HEMOGLOBIN A1C    Anxiety  Comments:  continue xanax prn.     Hypertension, unspecified type    Primary insomnia    Hyperlipidemia, unspecified hyperlipidemia type  Increase lipitor to 60mg   High risk medication use    Gastroesophageal reflux disease, unspecified whether esophagitis present    Physical exam    Continue to work on diet and exercise. Monitor bp at home.   Follow up in about 3 months (around 7/25/2022), or if symptoms worsen or fail to improve, for medication management.        5/2/2022 Karolina Ramirez

## 2022-05-12 ENCOUNTER — TELEPHONE (OUTPATIENT)
Dept: FAMILY MEDICINE | Facility: CLINIC | Age: 72
End: 2022-05-12

## 2022-05-12 ENCOUNTER — CLINICAL SUPPORT (OUTPATIENT)
Dept: FAMILY MEDICINE | Facility: CLINIC | Age: 72
End: 2022-05-12

## 2022-05-12 VITALS — DIASTOLIC BLOOD PRESSURE: 82 MMHG | SYSTOLIC BLOOD PRESSURE: 128 MMHG

## 2022-05-12 DIAGNOSIS — I10 ESSENTIAL HYPERTENSION: Primary | ICD-10-CM

## 2022-05-12 NOTE — TELEPHONE ENCOUNTER
----- Message from Kaley Deleon sent at 5/12/2022  9:02 AM CDT -----  Patient called and stated that the new blood pressure medicine is not working she stated she is tried all of the time and she cannot get straight please give her a call at [phone]

## 2022-05-12 NOTE — PROGRESS NOTES
Patient coming in today for nurse visit. States that she started her new Benicar but she states that she has not been feeling well since she has started.     Blood pressure reading today is 128/82    She was advised to continue her current medication regimen and follow up in a month.

## 2022-05-16 DIAGNOSIS — F41.9 ANXIETY: ICD-10-CM

## 2022-05-16 RX ORDER — ALPRAZOLAM 1 MG/1
1 TABLET ORAL NIGHTLY
Qty: 30 TABLET | Refills: 2 | Status: SHIPPED | OUTPATIENT
Start: 2022-05-16 | End: 2022-08-02 | Stop reason: SDUPTHER

## 2022-05-16 NOTE — TELEPHONE ENCOUNTER
----- Message from Barbara Daiz sent at 5/16/2022  8:34 AM CDT -----  Refill for Xanax. [facility] [address] [place]  in [place]. Pt #[phone]

## 2022-05-19 ENCOUNTER — HOSPITAL ENCOUNTER (OUTPATIENT)
Dept: RADIOLOGY | Facility: HOSPITAL | Age: 72
Discharge: HOME | End: 2022-05-19
Attending: NURSE PRACTITIONER
Payer: MEDICARE

## 2022-05-19 DIAGNOSIS — Z78.0 ENCOUNTER FOR OSTEOPOROSIS SCREENING IN ASYMPTOMATIC POSTMENOPAUSAL PATIENT: ICD-10-CM

## 2022-05-19 DIAGNOSIS — Z13.820 ENCOUNTER FOR OSTEOPOROSIS SCREENING IN ASYMPTOMATIC POSTMENOPAUSAL PATIENT: ICD-10-CM

## 2022-05-19 PROCEDURE — 77080 DXA BONE DENSITY AXIAL: CPT | Mod: TC,PO

## 2022-06-28 ENCOUNTER — CLINICAL SUPPORT (OUTPATIENT)
Dept: FAMILY MEDICINE | Facility: CLINIC | Age: 72
End: 2022-06-28
Payer: MEDICARE

## 2022-06-28 VITALS — DIASTOLIC BLOOD PRESSURE: 82 MMHG | SYSTOLIC BLOOD PRESSURE: 160 MMHG

## 2022-06-28 DIAGNOSIS — I10 ESSENTIAL HYPERTENSION: Primary | ICD-10-CM

## 2022-06-28 RX ORDER — LOSARTAN POTASSIUM 100 MG/1
100 TABLET ORAL DAILY
Qty: 30 TABLET | Refills: 0 | Status: SHIPPED | OUTPATIENT
Start: 2022-06-28 | End: 2022-06-29 | Stop reason: SDUPTHER

## 2022-06-28 RX ORDER — LOSARTAN POTASSIUM 100 MG/1
100 TABLET ORAL DAILY
COMMUNITY
End: 2022-06-28 | Stop reason: SDUPTHER

## 2022-06-28 RX ORDER — HYDRALAZINE HYDROCHLORIDE 25 MG/1
25 TABLET, FILM COATED ORAL 2 TIMES DAILY
Qty: 60 TABLET | Refills: 0 | Status: SHIPPED | OUTPATIENT
Start: 2022-06-28 | End: 2022-06-29 | Stop reason: SDUPTHER

## 2022-06-28 RX ORDER — HYDRALAZINE HYDROCHLORIDE 25 MG/1
25 TABLET, FILM COATED ORAL 2 TIMES DAILY
COMMUNITY
End: 2022-06-28 | Stop reason: SDUPTHER

## 2022-06-28 NOTE — PROGRESS NOTES
Pt is here for a nurse visit b/p check. Per Karolina, discontinue the Benicar. Karolina is adding Losartan 100 mg daily and Hydralazine 25 MG BID. Will set up medication to be prescribe by Karolina.

## 2022-06-29 DIAGNOSIS — I10 ESSENTIAL HYPERTENSION: ICD-10-CM

## 2022-06-29 RX ORDER — LOSARTAN POTASSIUM 100 MG/1
100 TABLET ORAL DAILY
Qty: 30 TABLET | Refills: 0 | Status: SHIPPED | OUTPATIENT
Start: 2022-06-29 | End: 2022-07-27 | Stop reason: SDUPTHER

## 2022-06-29 RX ORDER — HYDRALAZINE HYDROCHLORIDE 25 MG/1
25 TABLET, FILM COATED ORAL 2 TIMES DAILY
Qty: 60 TABLET | Refills: 0 | Status: SHIPPED | OUTPATIENT
Start: 2022-06-29 | End: 2022-07-27 | Stop reason: SDUPTHER

## 2022-06-29 NOTE — TELEPHONE ENCOUNTER
----- Message from Kaley Deleon sent at 6/29/2022  9:57 AM CDT -----  Patient called and stated that they sent her blood pressure medicine was sent to the mail order pharmacy and she stated she told the nurse [facility] in [place] and she still sent it to the wrong place please send medicine to [facility] she is out please give her a call at [phone] please give her a call and let her know when it is called in

## 2022-06-29 NOTE — TELEPHONE ENCOUNTER
New prescription from yesterday was sent to the wrong pharmacy. Prescription needs to be sent to her local pharmacy instead of her mail delivery.

## 2022-07-12 ENCOUNTER — CLINICAL SUPPORT (OUTPATIENT)
Dept: FAMILY MEDICINE | Facility: CLINIC | Age: 72
End: 2022-07-12
Payer: MEDICARE

## 2022-07-12 VITALS — SYSTOLIC BLOOD PRESSURE: 136 MMHG | DIASTOLIC BLOOD PRESSURE: 72 MMHG

## 2022-07-12 DIAGNOSIS — I10 ESSENTIAL HYPERTENSION: Primary | ICD-10-CM

## 2022-07-12 NOTE — PROGRESS NOTES
Pt is here today for a blood pressure nurse visit. She states that she has taken her medicine this morning. Per Karolina, the patient was advised to continue her current regimen and follow up as scheduled.

## 2022-07-27 DIAGNOSIS — I10 ESSENTIAL HYPERTENSION: ICD-10-CM

## 2022-07-27 RX ORDER — LOSARTAN POTASSIUM 100 MG/1
100 TABLET ORAL DAILY
Qty: 30 TABLET | Refills: 5 | Status: SHIPPED | OUTPATIENT
Start: 2022-07-27 | End: 2022-11-14 | Stop reason: SDUPTHER

## 2022-07-27 RX ORDER — HYDRALAZINE HYDROCHLORIDE 25 MG/1
25 TABLET, FILM COATED ORAL 2 TIMES DAILY
Qty: 60 TABLET | Refills: 5 | Status: SHIPPED | OUTPATIENT
Start: 2022-07-27 | End: 2022-11-14

## 2022-07-27 NOTE — TELEPHONE ENCOUNTER
----- Message from Syl Hedrick MA sent at 7/27/2022  9:49 AM CDT -----  Pt calling for a refill on her losartan, hydralazine to be sent to [facility] in [place].

## 2022-08-02 ENCOUNTER — OFFICE VISIT (OUTPATIENT)
Dept: FAMILY MEDICINE | Facility: CLINIC | Age: 72
End: 2022-08-02
Payer: MEDICARE

## 2022-08-02 VITALS
DIASTOLIC BLOOD PRESSURE: 80 MMHG | SYSTOLIC BLOOD PRESSURE: 150 MMHG | HEIGHT: 61 IN | BODY MASS INDEX: 26.81 KG/M2 | WEIGHT: 142 LBS

## 2022-08-02 DIAGNOSIS — E11.65 UNCONTROLLED TYPE 2 DIABETES MELLITUS WITH HYPERGLYCEMIA: Primary | ICD-10-CM

## 2022-08-02 DIAGNOSIS — I10 HYPERTENSION, UNSPECIFIED TYPE: ICD-10-CM

## 2022-08-02 DIAGNOSIS — Z63.6 CAREGIVER STRESS: ICD-10-CM

## 2022-08-02 DIAGNOSIS — E78.5 HYPERLIPIDEMIA, UNSPECIFIED HYPERLIPIDEMIA TYPE: ICD-10-CM

## 2022-08-02 DIAGNOSIS — Z79.899 HIGH RISK MEDICATION USE: ICD-10-CM

## 2022-08-02 DIAGNOSIS — M15.9 OSTEOARTHRITIS OF MULTIPLE JOINTS, UNSPECIFIED OSTEOARTHRITIS TYPE: ICD-10-CM

## 2022-08-02 DIAGNOSIS — F41.9 ANXIETY: ICD-10-CM

## 2022-08-02 DIAGNOSIS — E11.65 UNCONTROLLED TYPE 2 DIABETES MELLITUS WITH HYPERGLYCEMIA: ICD-10-CM

## 2022-08-02 PROCEDURE — 83036 HEMOGLOBIN GLYCOSYLATED A1C: CPT | Mod: QW,,, | Performed by: NURSE PRACTITIONER

## 2022-08-02 PROCEDURE — 4010F PR ACE/ARB THEARPY RXD/TAKEN: ICD-10-PCS | Mod: CPTII,S$GLB,, | Performed by: NURSE PRACTITIONER

## 2022-08-02 PROCEDURE — 3066F PR DOCUMENTATION OF TREATMENT FOR NEPHROPATHY: ICD-10-PCS | Mod: CPTII,S$GLB,, | Performed by: NURSE PRACTITIONER

## 2022-08-02 PROCEDURE — 3008F BODY MASS INDEX DOCD: CPT | Mod: CPTII,S$GLB,, | Performed by: NURSE PRACTITIONER

## 2022-08-02 PROCEDURE — 1160F RVW MEDS BY RX/DR IN RCRD: CPT | Mod: CPTII,S$GLB,, | Performed by: NURSE PRACTITIONER

## 2022-08-02 PROCEDURE — 3008F PR BODY MASS INDEX (BMI) DOCUMENTED: ICD-10-PCS | Mod: CPTII,S$GLB,, | Performed by: NURSE PRACTITIONER

## 2022-08-02 PROCEDURE — 3077F SYST BP >= 140 MM HG: CPT | Mod: CPTII,S$GLB,, | Performed by: NURSE PRACTITIONER

## 2022-08-02 PROCEDURE — 1159F MED LIST DOCD IN RCRD: CPT | Mod: CPTII,S$GLB,, | Performed by: NURSE PRACTITIONER

## 2022-08-02 PROCEDURE — 99214 PR OFFICE/OUTPT VISIT, EST, LEVL IV, 30-39 MIN: ICD-10-PCS | Mod: S$GLB,,, | Performed by: NURSE PRACTITIONER

## 2022-08-02 PROCEDURE — 3066F NEPHROPATHY DOC TX: CPT | Mod: CPTII,S$GLB,, | Performed by: NURSE PRACTITIONER

## 2022-08-02 PROCEDURE — 1101F PT FALLS ASSESS-DOCD LE1/YR: CPT | Mod: CPTII,S$GLB,, | Performed by: NURSE PRACTITIONER

## 2022-08-02 PROCEDURE — 3288F FALL RISK ASSESSMENT DOCD: CPT | Mod: CPTII,S$GLB,, | Performed by: NURSE PRACTITIONER

## 2022-08-02 PROCEDURE — 3079F PR MOST RECENT DIASTOLIC BLOOD PRESSURE 80-89 MM HG: ICD-10-PCS | Mod: CPTII,S$GLB,, | Performed by: NURSE PRACTITIONER

## 2022-08-02 PROCEDURE — 3044F PR MOST RECENT HEMOGLOBIN A1C LEVEL <7.0%: ICD-10-PCS | Mod: CPTII,S$GLB,, | Performed by: NURSE PRACTITIONER

## 2022-08-02 PROCEDURE — 1101F PR PT FALLS ASSESS DOC 0-1 FALLS W/OUT INJ PAST YR: ICD-10-PCS | Mod: CPTII,S$GLB,, | Performed by: NURSE PRACTITIONER

## 2022-08-02 PROCEDURE — 3077F PR MOST RECENT SYSTOLIC BLOOD PRESSURE >= 140 MM HG: ICD-10-PCS | Mod: CPTII,S$GLB,, | Performed by: NURSE PRACTITIONER

## 2022-08-02 PROCEDURE — 83036 POCT HEMOGLOBIN A1C: ICD-10-PCS | Mod: QW,,, | Performed by: NURSE PRACTITIONER

## 2022-08-02 PROCEDURE — 3060F PR POS MICROALBUMINURIA RESULT DOCUMENTED/REVIEW: ICD-10-PCS | Mod: CPTII,S$GLB,, | Performed by: NURSE PRACTITIONER

## 2022-08-02 PROCEDURE — 1159F PR MEDICATION LIST DOCUMENTED IN MEDICAL RECORD: ICD-10-PCS | Mod: CPTII,S$GLB,, | Performed by: NURSE PRACTITIONER

## 2022-08-02 PROCEDURE — 3288F PR FALLS RISK ASSESSMENT DOCUMENTED: ICD-10-PCS | Mod: CPTII,S$GLB,, | Performed by: NURSE PRACTITIONER

## 2022-08-02 PROCEDURE — 99214 OFFICE O/P EST MOD 30 MIN: CPT | Mod: S$GLB,,, | Performed by: NURSE PRACTITIONER

## 2022-08-02 PROCEDURE — 3060F POS MICROALBUMINURIA REV: CPT | Mod: CPTII,S$GLB,, | Performed by: NURSE PRACTITIONER

## 2022-08-02 PROCEDURE — 3079F DIAST BP 80-89 MM HG: CPT | Mod: CPTII,S$GLB,, | Performed by: NURSE PRACTITIONER

## 2022-08-02 PROCEDURE — 4010F ACE/ARB THERAPY RXD/TAKEN: CPT | Mod: CPTII,S$GLB,, | Performed by: NURSE PRACTITIONER

## 2022-08-02 PROCEDURE — 3044F HG A1C LEVEL LT 7.0%: CPT | Mod: CPTII,S$GLB,, | Performed by: NURSE PRACTITIONER

## 2022-08-02 PROCEDURE — 1160F PR REVIEW ALL MEDS BY PRESCRIBER/CLIN PHARMACIST DOCUMENTED: ICD-10-PCS | Mod: CPTII,S$GLB,, | Performed by: NURSE PRACTITIONER

## 2022-08-02 SDOH — SOCIAL DETERMINANTS OF HEALTH (SDOH): DEPENDENT RELATIVE NEEDING CARE AT HOME: Z63.6

## 2022-08-03 ENCOUNTER — TELEPHONE (OUTPATIENT)
Dept: FAMILY MEDICINE | Facility: CLINIC | Age: 72
End: 2022-08-03

## 2022-08-03 RX ORDER — MELOXICAM 15 MG/1
15 TABLET ORAL DAILY
Qty: 90 TABLET | Refills: 1 | Status: SHIPPED | OUTPATIENT
Start: 2022-08-03 | End: 2022-11-14 | Stop reason: SDUPTHER

## 2022-08-03 RX ORDER — ALPRAZOLAM 1 MG/1
1 TABLET ORAL NIGHTLY
Qty: 30 TABLET | Refills: 2 | Status: SHIPPED | OUTPATIENT
Start: 2022-08-03 | End: 2022-11-10 | Stop reason: SDUPTHER

## 2022-08-03 RX ORDER — METFORMIN HYDROCHLORIDE 500 MG/1
500 TABLET ORAL 2 TIMES DAILY
Qty: 180 TABLET | Refills: 1 | Status: SHIPPED | OUTPATIENT
Start: 2022-08-03 | End: 2022-11-14 | Stop reason: SDUPTHER

## 2022-08-03 RX ORDER — ATORVASTATIN CALCIUM 40 MG/1
60 TABLET, FILM COATED ORAL DAILY
Qty: 90 TABLET | Refills: 1
Start: 2022-08-03 | End: 2022-08-23 | Stop reason: SDUPTHER

## 2022-08-03 RX ORDER — HYDROCHLOROTHIAZIDE 12.5 MG/1
25 TABLET ORAL DAILY
Start: 2022-08-03 | End: 2022-08-23 | Stop reason: SDUPTHER

## 2022-08-03 RX ORDER — POTASSIUM CHLORIDE 750 MG/1
10 CAPSULE, EXTENDED RELEASE ORAL ONCE
Qty: 30 CAPSULE | Refills: 0 | Status: SHIPPED | OUTPATIENT
Start: 2022-08-03 | End: 2022-08-03

## 2022-08-03 NOTE — TELEPHONE ENCOUNTER
----- Message from Syl Hedrick MA sent at 8/3/2022  2:39 PM CDT -----  Pt calling for her potassium to be sent to the pharmacy. [phone]

## 2022-08-05 LAB — HBA1C MFR BLD: 6.7 %

## 2022-08-10 NOTE — PROGRESS NOTES
SUBJECTIVE:    Patient ID: Kay Valencia is a 71 y.o. female.    Chief Complaint: Hypertension (Brought bottles//medication refills//mammogram scheduled for 10/22//bs), Hyperlipidemia, and Diabetes (Needs A1c )    71-year-old female presents for check up. She is treated for diabetes, htn, oa, anxiety, insomnia, she is taking medications as prescribed. bp in office is well controlled. Does not check bp at home. a1c is 6.7mg/dl. thi is unchanged from previous. Under some stressors. 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 Sleeps ok. Sometimes has trouble falling asleep. Feels rested in am..  Last labs were in april      Office Visit on 08/02/2022   Component Date Value Ref Range Status    Hemoglobin A1C 08/05/2022 6.7  % Final   Office Visit on 04/25/2022   Component Date Value Ref Range Status    Hemoglobin A1C 04/25/2022 6.7  % Final   Office Visit on 03/09/2022   Component Date Value Ref Range Status    WBC 04/20/2022 3.5 (A) 3.8 - 10.8 Thousand/uL Final    RBC 04/20/2022 3.85  3.80 - 5.10 Million/uL Final    Hemoglobin 04/20/2022 12.1  11.7 - 15.5 g/dL Final    Hematocrit 04/20/2022 35.4  35.0 - 45.0 % Final    MCV 04/20/2022 91.9  80.0 - 100.0 fL Final    MCH 04/20/2022 31.4  27.0 - 33.0 pg Final    MCHC 04/20/2022 34.2  32.0 - 36.0 g/dL Final    RDW 04/20/2022 12.9  11.0 - 15.0 % Final    Platelets 04/20/2022 128 (A) 140 - 400 Thousand/uL Final    MPV 04/20/2022 10.4  7.5 - 12.5 fL Final    Neutrophils, Abs 04/20/2022 2,146  1,500 - 7,800 cells/uL Final    Lymph # 04/20/2022 711 (A) 850 - 3,900 cells/uL Final    Mono # 04/20/2022 336  200 - 950 cells/uL Final    Eos # 04/20/2022 249  15 - 500 cells/uL Final    Baso # 04/20/2022 60  0 - 200 cells/uL Final    Neutrophils Relative 04/20/2022 61.3  % Final    Lymph % 04/20/2022 20.3  % Final    Mono % 04/20/2022 9.6  % Final    Eosinophil % 04/20/2022 7.1  % Final    Basophil % 04/20/2022 1.7  % Final    Glucose 04/20/2022 144 (A) 65 - 99 mg/dL Final     BUN 04/20/2022 25  7 - 25 mg/dL Final    Creatinine 04/20/2022 0.92  0.60 - 0.93 mg/dL Final    eGFR if non  04/20/2022 63  > OR = 60 mL/min/1.73m2 Final    eGFR if  04/20/2022 73  > OR = 60 mL/min/1.73m2 Final    BUN/Creatinine Ratio 04/20/2022 NOT APPLICABLE  6 - 22 (calc) Final    Sodium 04/20/2022 137  135 - 146 mmol/L Final    Potassium 04/20/2022 3.8  3.5 - 5.3 mmol/L Final    Chloride 04/20/2022 101  98 - 110 mmol/L Final    CO2 04/20/2022 27  20 - 32 mmol/L Final    Calcium 04/20/2022 9.6  8.6 - 10.4 mg/dL Final    Total Protein 04/20/2022 6.3  6.1 - 8.1 g/dL Final    Albumin 04/20/2022 4.3  3.6 - 5.1 g/dL Final    Globulin, Total 04/20/2022 2.0  1.9 - 3.7 g/dL (calc) Final    Albumin/Globulin Ratio 04/20/2022 2.2  1.0 - 2.5 (calc) Final    Total Bilirubin 04/20/2022 0.7  0.2 - 1.2 mg/dL Final    Alkaline Phosphatase 04/20/2022 68  37 - 153 U/L Final    AST 04/20/2022 17  10 - 35 U/L Final    ALT 04/20/2022 24  6 - 29 U/L Final    Cholesterol 04/20/2022 256 (A) <200 mg/dL Final    HDL 04/20/2022 46 (A) > OR = 50 mg/dL Final    Triglycerides 04/20/2022 301 (A) <150 mg/dL Final    LDL Cholesterol 04/20/2022 164 (A) mg/dL (calc) Final    HDL/Cholesterol Ratio 04/20/2022 5.6 (A) <5.0 (calc) Final    Non HDL Chol. (LDL+VLDL) 04/20/2022 210 (A) <130 mg/dL (calc) Final    TSH w/reflex to FT4 04/20/2022 0.43  0.40 - 4.50 mIU/L Final    Creatinine, Urine 04/20/2022 71  20 - 275 mg/dL Final    Microalb, Ur 04/20/2022 2.4  See Note: mg/dL Final    Microalb/Creat Ratio 04/20/2022 34 (A) <30 mcg/mg creat Final    Color, UA 04/20/2022 YELLOW  YELLOW Final    Appearance, UA 04/20/2022 CLEAR  CLEAR Final    Specific Gravity, UA 04/20/2022 1.016  1.001 - 1.035 Final    pH, UA 04/20/2022 7.0  5.0 - 8.0 Final    Glucose, UA 04/20/2022 NEGATIVE  NEGATIVE Final    Bilirubin, UA 04/20/2022 NEGATIVE  NEGATIVE Final    Ketones, UA 04/20/2022 NEGATIVE  NEGATIVE  Final    Occult Blood UA 04/20/2022 NEGATIVE  NEGATIVE Final    Protein, UA 04/20/2022 NEGATIVE  NEGATIVE Final    Nitrite, UA 04/20/2022 NEGATIVE  NEGATIVE Final    Leukocytes, UA 04/20/2022 TRACE (A) NEGATIVE Final    WBC Casts, UA 04/20/2022 NONE SEEN  < OR = 5 /HPF Final    RBC Casts, UA 04/20/2022 NONE SEEN  < OR = 2 /HPF Final    Squam Epithel, UA 04/20/2022 6-10 (A) < OR = 5 /HPF Final    Bacteria, UA 04/20/2022 NONE SEEN  NONE SEEN /HPF Final    Hyaline Casts, UA 04/20/2022 NONE SEEN  NONE SEEN /LPF Final    Reflexive Urine Culture 04/20/2022    Final    Urine Culture, Routine 04/20/2022  (A)  Final   Office Visit on 02/09/2022   Component Date Value Ref Range Status    Hemoglobin A1C 02/09/2022 7.4  % Final    Color, UA 02/09/2022 YELLOW  YELLOW Final    Appearance, UA 02/09/2022 CLEAR  CLEAR Final    Specific Gravity, UA 02/09/2022 1.006  1.001 - 1.035 Final    pH, UA 02/09/2022 7.0  5.0 - 8.0 Final    Glucose, UA 02/09/2022 NEGATIVE  NEGATIVE Final    Bilirubin, UA 02/09/2022 NEGATIVE  NEGATIVE Final    Ketones, UA 02/09/2022 NEGATIVE  NEGATIVE Final    Occult Blood UA 02/09/2022 NEGATIVE  NEGATIVE Final    Protein, UA 02/09/2022 NEGATIVE  NEGATIVE Final    Nitrite, UA 02/09/2022 NEGATIVE  NEGATIVE Final    Leukocytes, UA 02/09/2022 NEGATIVE  NEGATIVE Final    WBC Casts, UA 02/09/2022 NONE SEEN  < OR = 5 /HPF Final    RBC Casts, UA 02/09/2022 NONE SEEN  < OR = 2 /HPF Final    Squam Epithel, UA 02/09/2022 NONE SEEN  < OR = 5 /HPF Final    Bacteria, UA 02/09/2022 NONE SEEN  NONE SEEN /HPF Final    Hyaline Casts, UA 02/09/2022 NONE SEEN  NONE SEEN /LPF Final    Reflexive Urine Culture 02/09/2022    Final       Past Medical History:   Diagnosis Date    Anxiety     Depression     Diabetes mellitus, type 2     GERD (gastroesophageal reflux disease)     Hyperlipidemia     Hypertension      Social History     Socioeconomic History    Marital status:    Tobacco Use     Smoking status: Former Smoker    Smokeless tobacco: Never Used   Substance and Sexual Activity    Alcohol use: No    Drug use: No    Sexual activity: Yes     Past Surgical History:   Procedure Laterality Date    BREAST BIOPSY Left 1990'S    BENIGN    HYSTERECTOMY      INCONTINENCE SURGERY       Family History   Problem Relation Age of Onset    Diabetes Maternal Grandmother     Hypertension Maternal Grandmother     Diabetes Father     Breast cancer Mother 57    Hypertension Mother     Diabetes Sister     Ovarian cancer Neg Hx        Review of patient's allergies indicates:   Allergen Reactions    Flexeril [cyclobenzaprine] Other (See Comments)     Unknown was a long time ago.    Keflex [cephalexin] Diarrhea    Sulfa (sulfonamide antibiotics) Nausea Only    Augmentin [amoxicillin-pot clavulanate] Other (See Comments)     Gas       Current Outpatient Medications:     famotidine (PEPCID) 40 MG tablet, Take 1 tablet (40 mg total) by mouth once daily., Disp: 30 tablet, Rfl: 11    hydrALAZINE (APRESOLINE) 25 MG tablet, Take 1 tablet (25 mg total) by mouth 2 (two) times a day., Disp: 60 tablet, Rfl: 5    losartan (COZAAR) 100 MG tablet, Take 1 tablet (100 mg total) by mouth once daily., Disp: 30 tablet, Rfl: 5    pantoprazole (PROTONIX) 40 MG tablet, Take 1 tablet (40 mg total) by mouth once daily., Disp: 30 tablet, Rfl: 11    ACCU-CHEK DAYA PLUS METER [facility], , Disp: , Rfl:     ACCU-CHEK SOFTCLIX LANCETS Misc, , Disp: , Rfl:     ALPRAZolam (XANAX) 1 MG tablet, Take 1 tablet (1 mg total) by mouth every evening., Disp: 30 tablet, Rfl: 2    atorvastatin (LIPITOR) 40 MG tablet, Take 1.5 tablets (60 mg total) by mouth once daily., Disp: 90 tablet, Rfl: 1    blood sugar diagnostic Strp, 1 strip by Misc.(Non-Drug; Combo Route) route once daily., Disp: 100 each, Rfl: 2    calcium carbonate/vitamin D3 (CALTRATE 600 PLUS D ORAL), Caltrate 600 plus D, Disp: , Rfl:     estradioL (ESTRACE) 0.01 % (0.1  mg/gram) vaginal cream, Place 1 g vaginally every Mon, Wed, Fri., Disp: 12 g, Rfl: 11    hydroCHLOROthiazide (HYDRODIURIL) 12.5 MG Tab, Take 2 tablets (25 mg total) by mouth once daily., Disp: , Rfl:     HYDROcodone-acetaminophen (NORCO)  mg per tablet, Take 1 tablet by mouth every 8 (eight) hours as needed for Pain., Disp: 30 tablet, Rfl: 0    hydrocortisone 1 % ointment, Apply topically 2 (two) times daily., Disp: 57 g, Rfl: 0    Lactobacillus rhamnosus GG (CULTURELLE) 10 billion cell capsule, Take 1 capsule by mouth once daily., Disp: , Rfl:     lancets 33 gauge Misc, 1 lancet by Misc.(Non-Drug; Combo Route) route 3 (three) times a week., Disp: 100 each, Rfl: 3    melatonin 5 mg Cap, Take by mouth., Disp: , Rfl:     meloxicam (MOBIC) 15 MG tablet, Take 1 tablet (15 mg total) by mouth once daily., Disp: 90 tablet, Rfl: 1    metFORMIN (GLUCOPHAGE) 500 MG tablet, Take 1 tablet (500 mg total) by mouth 2 (two) times daily., Disp: 180 tablet, Rfl: 1    olmesartan-hydrochlorothiazide (BENICAR HCT) 40-12.5 mg Tab, Take 1 tablet by mouth once daily., Disp: 90 tablet, Rfl: 3    omega 3-dha-epa-fish oil 100-160-1,000 mg Cap, Fish Oil, Disp: , Rfl:     traZODone (DESYREL) 50 MG tablet, Take 1 tablet (50 mg total) by mouth every evening., Disp: 30 tablet, Rfl: 1    Review of Systems   Constitutional: Negative for chills, fever and unexpected weight change.   HENT: Negative for ear pain, rhinorrhea and sore throat.    Eyes: Negative for pain and visual disturbance.   Respiratory: Negative for cough and shortness of breath.    Cardiovascular: Negative for chest pain, palpitations and leg swelling.   Gastrointestinal: Negative for abdominal pain, diarrhea, nausea and vomiting.   Genitourinary: Negative for difficulty urinating, hematuria and vaginal bleeding.   Musculoskeletal: Negative for arthralgias.   Skin: Negative for rash.   Neurological: Negative for dizziness, weakness and headaches.  "  Psychiatric/Behavioral: Negative for agitation and sleep disturbance. The patient is not nervous/anxious.           Objective:      Vitals:    08/02/22 1242   BP: (P) 138/78   Pulse: (P) 88   Weight: 64.4 kg (142 lb)   Height: 5' 1" (1.549 m)     Physical Exam  Constitutional:       Appearance: Normal appearance. She is well-developed.   HENT:      Head: Normocephalic.      Right Ear: External ear normal.      Left Ear: External ear normal.   Eyes:      Conjunctiva/sclera: Conjunctivae normal.      Pupils: Pupils are equal, round, and reactive to light.   Neck:      Vascular: No JVD.   Cardiovascular:      Rate and Rhythm: Normal rate and regular rhythm.      Heart sounds: No murmur heard.  Pulmonary:      Effort: Pulmonary effort is normal.      Breath sounds: Normal breath sounds.   Abdominal:      General: Bowel sounds are normal.      Palpations: Abdomen is soft.   Musculoskeletal:         General: No deformity. Normal range of motion.      Cervical back: Normal range of motion and neck supple.   Feet:      Right foot:      Protective Sensation: 5 sites tested. 5 sites sensed.      Left foot:      Protective Sensation: 5 sites tested. 5 sites sensed.   Lymphadenopathy:      Cervical: No cervical adenopathy.   Skin:     General: Skin is warm and dry.      Findings: No rash.   Neurological:      Mental Status: She is alert and oriented to person, place, and time.      Gait: Gait normal.   Psychiatric:         Speech: Speech normal.         Behavior: Behavior normal.           Assessment:       1. Uncontrolled type 2 diabetes mellitus with hyperglycemia    2. Anxiety    3. Hyperlipidemia, unspecified hyperlipidemia type    4. Hypertension, unspecified type    5. Uncontrolled type 2 diabetes mellitus with hyperglycemia    6. Osteoarthritis of multiple joints, unspecified osteoarthritis type    7. High risk medication use         Plan:       Uncontrolled type 2 diabetes mellitus with hyperglycemia  -     POCT " HEMOGLOBIN A1C  -     metFORMIN (GLUCOPHAGE) 500 MG tablet; Take 1 tablet (500 mg total) by mouth 2 (two) times daily.  Dispense: 180 tablet; Refill: 1    Anxiety  Comments:  continue xanax prn.   Orders:  -     ALPRAZolam (XANAX) 1 MG tablet; Take 1 tablet (1 mg total) by mouth every evening.  Dispense: 30 tablet; Refill: 2    Hyperlipidemia, unspecified hyperlipidemia type  -     atorvastatin (LIPITOR) 40 MG tablet; Take 1.5 tablets (60 mg total) by mouth once daily.  Dispense: 90 tablet; Refill: 1    Hypertension, unspecified type  -     hydroCHLOROthiazide (HYDRODIURIL) 12.5 MG Tab; Take 2 tablets (25 mg total) by mouth once daily.    Uncontrolled type 2 diabetes mellitus with hyperglycemia  Comments:  start diet. increase activity  Orders:  -     POCT HEMOGLOBIN A1C  -     metFORMIN (GLUCOPHAGE) 500 MG tablet; Take 1 tablet (500 mg total) by mouth 2 (two) times daily.  Dispense: 180 tablet; Refill: 1    Osteoarthritis of multiple joints, unspecified osteoarthritis type    High risk medication use  -     potassium chloride (MICRO-K) 10 MEQ CpSR; Take 1 capsule (10 mEq total) by mouth once. for 1 dose  Dispense: 30 capsule; Refill: 0    Other orders  -     meloxicam (MOBIC) 15 MG tablet; Take 1 tablet (15 mg total) by mouth once daily.  Dispense: 90 tablet; Refill: 1      Follow up in about 3 months (around 11/2/2022), or if symptoms worsen or fail to improve, for medication management.        8/9/2022 Karolina Ramirez

## 2022-08-17 DIAGNOSIS — E11.65 UNCONTROLLED TYPE 2 DIABETES MELLITUS WITH HYPERGLYCEMIA: ICD-10-CM

## 2022-08-17 NOTE — TELEPHONE ENCOUNTER
----- Message from Syl Hedrick MA sent at 8/17/2022 12:23 PM CDT -----  Pt is calling for a refill on her diabetic test strips to be sent to [facility] mail order pharmacy.

## 2022-08-22 ENCOUNTER — TELEPHONE (OUTPATIENT)
Dept: FAMILY MEDICINE | Facility: CLINIC | Age: 72
End: 2022-08-22

## 2022-08-22 NOTE — TELEPHONE ENCOUNTER
----- Message from Francisco Javier Whitlock sent at 8/22/2022 10:56 AM CDT -----  Contact: Patient  Pt said her Jillian test strips for her diabetes machine are discontinued and she needs the avia guide test strips sent to [facility]   [phone]

## 2022-08-23 DIAGNOSIS — I10 HYPERTENSION, UNSPECIFIED TYPE: ICD-10-CM

## 2022-08-23 DIAGNOSIS — E11.65 UNCONTROLLED TYPE 2 DIABETES MELLITUS WITH HYPERGLYCEMIA: Primary | ICD-10-CM

## 2022-08-23 DIAGNOSIS — E78.5 HYPERLIPIDEMIA, UNSPECIFIED HYPERLIPIDEMIA TYPE: ICD-10-CM

## 2022-08-23 RX ORDER — LANCETS
EACH MISCELLANEOUS
Qty: 200 EACH | Refills: 1 | Status: SHIPPED | OUTPATIENT
Start: 2022-08-23

## 2022-08-23 RX ORDER — INSULIN PUMP SYRINGE, 3 ML
EACH MISCELLANEOUS
Qty: 1 EACH | Refills: 0 | Status: SHIPPED | OUTPATIENT
Start: 2022-08-23 | End: 2024-02-27

## 2022-08-23 RX ORDER — HYDROCHLOROTHIAZIDE 12.5 MG/1
25 TABLET ORAL DAILY
Qty: 30 TABLET | Refills: 0 | Status: SHIPPED | OUTPATIENT
Start: 2022-08-23 | End: 2022-11-14

## 2022-08-23 RX ORDER — ATORVASTATIN CALCIUM 40 MG/1
60 TABLET, FILM COATED ORAL DAILY
Qty: 135 TABLET | Refills: 1 | Status: SHIPPED | OUTPATIENT
Start: 2022-08-23 | End: 2022-11-14 | Stop reason: SDUPTHER

## 2022-08-23 RX ORDER — HYDROCHLOROTHIAZIDE 12.5 MG/1
25 TABLET ORAL DAILY
Qty: 180 TABLET | Refills: 1 | Status: SHIPPED | OUTPATIENT
Start: 2022-08-23 | End: 2022-11-14 | Stop reason: SDUPTHER

## 2022-08-23 RX ORDER — ATORVASTATIN CALCIUM 40 MG/1
60 TABLET, FILM COATED ORAL DAILY
Qty: 30 TABLET | Refills: 0 | Status: SHIPPED | OUTPATIENT
Start: 2022-08-23 | End: 2022-11-14

## 2022-08-23 NOTE — TELEPHONE ENCOUNTER
Spoke to pt. Both rx were set up as no print. Pt is out of both medications. Short supple set up to local pharm and 90 day set up to [facility].     Pt also needing new diabetic supplies sent to [email or web address].

## 2022-08-23 NOTE — TELEPHONE ENCOUNTER
----- Message from Syl Hedrick MA sent at 8/23/2022 12:00 PM CDT -----  Pt is calling because she still hasn't received her atorvastatin nor her hydrochlorothiazide. She uses [facility] mail order and they keep telling her it was never sent on. # [phone]

## 2022-09-06 DIAGNOSIS — Z79.899 HIGH RISK MEDICATION USE: Primary | ICD-10-CM

## 2022-09-06 RX ORDER — POTASSIUM CHLORIDE 750 MG/1
10 CAPSULE, EXTENDED RELEASE ORAL ONCE
Qty: 30 CAPSULE | Refills: 2 | Status: SHIPPED | OUTPATIENT
Start: 2022-09-06 | End: 2022-09-06

## 2022-09-06 NOTE — TELEPHONE ENCOUNTER
----- Message from Kaley Deleon sent at 9/6/2022  2:48 PM CDT -----  Patient called and stated that she need a refill of her potassium chloride called into [facility] in [facility] if any questions please give her a call at [phone]

## 2022-09-08 RX ORDER — POTASSIUM CHLORIDE 750 MG/1
10 CAPSULE, EXTENDED RELEASE ORAL ONCE
Qty: 90 CAPSULE | Refills: 1 | Status: SHIPPED | OUTPATIENT
Start: 2022-09-08 | End: 2022-09-08

## 2022-09-08 NOTE — TELEPHONE ENCOUNTER
----- Message from Barbara Diaz sent at 9/8/2022  9:37 AM CDT -----  Refill for Potasium needs to go to [facility] on [address] in [place] with refills added. Pt #[phone]

## 2022-09-08 NOTE — TELEPHONE ENCOUNTER
Spoke to patient and refill needs to go to [facility] and she did confirm. She is aware medication will be sent today.

## 2022-09-08 NOTE — TELEPHONE ENCOUNTER
Dorene - come and talk with me about this one.   Did you talk with patient and confirm that she is taking potassium?   Also, 30 day supply was sent to the mail order pharmacy.   Is there a reason why you sent it to [facility]?   Karolina sees this patient.

## 2022-09-15 RX ORDER — POTASSIUM CHLORIDE 750 MG/1
10 CAPSULE, EXTENDED RELEASE ORAL DAILY
Qty: 90 CAPSULE | Refills: 1 | Status: SHIPPED | OUTPATIENT
Start: 2022-09-15 | End: 2022-11-14 | Stop reason: SDUPTHER

## 2022-09-15 NOTE — TELEPHONE ENCOUNTER
----- Message from Azul Vivas sent at 9/15/2022  2:25 PM CDT -----  Prince camejo is calling and needs clarification on potassium directions. Ordered received is one capsule for one dose qty 30 with 2 refill. Should it be once daily   Ref# [id number]  phone # [phone]

## 2022-11-10 DIAGNOSIS — F41.9 ANXIETY: ICD-10-CM

## 2022-11-10 RX ORDER — ALPRAZOLAM 1 MG/1
1 TABLET ORAL NIGHTLY
Qty: 30 TABLET | Refills: 2 | Status: SHIPPED | OUTPATIENT
Start: 2022-11-10 | End: 2023-02-10 | Stop reason: SDUPTHER

## 2022-11-10 NOTE — TELEPHONE ENCOUNTER
----- Message from Kaley Deleon sent at 11/10/2022 12:43 PM CST -----  Patient called and stated that she need a refill of her alprazolam called into [facility] on in [place] if any questions please give her a call at [phone]

## 2022-11-14 ENCOUNTER — OFFICE VISIT (OUTPATIENT)
Dept: FAMILY MEDICINE | Facility: CLINIC | Age: 72
End: 2022-11-14
Payer: MEDICARE

## 2022-11-14 ENCOUNTER — TELEPHONE (OUTPATIENT)
Dept: FAMILY MEDICINE | Facility: CLINIC | Age: 72
End: 2022-11-14

## 2022-11-14 ENCOUNTER — HOSPITAL ENCOUNTER (OUTPATIENT)
Dept: RADIOLOGY | Facility: HOSPITAL | Age: 72
Discharge: HOME | End: 2022-11-14
Attending: NURSE PRACTITIONER
Payer: MEDICARE

## 2022-11-14 ENCOUNTER — PATIENT MESSAGE (OUTPATIENT)
Dept: FAMILY MEDICINE | Facility: CLINIC | Age: 72
End: 2022-11-14

## 2022-11-14 VITALS
SYSTOLIC BLOOD PRESSURE: 134 MMHG | DIASTOLIC BLOOD PRESSURE: 68 MMHG | BODY MASS INDEX: 27.19 KG/M2 | WEIGHT: 144 LBS | HEART RATE: 68 BPM | HEIGHT: 61 IN

## 2022-11-14 DIAGNOSIS — K21.9 GASTROESOPHAGEAL REFLUX DISEASE, UNSPECIFIED WHETHER ESOPHAGITIS PRESENT: ICD-10-CM

## 2022-11-14 DIAGNOSIS — I10 ESSENTIAL HYPERTENSION: ICD-10-CM

## 2022-11-14 DIAGNOSIS — Z23 NEED FOR INFLUENZA VACCINATION: Primary | ICD-10-CM

## 2022-11-14 DIAGNOSIS — Z79.899 HIGH RISK MEDICATION USE: ICD-10-CM

## 2022-11-14 DIAGNOSIS — Z12.31 ENCOUNTER FOR SCREENING MAMMOGRAM FOR BREAST CANCER: ICD-10-CM

## 2022-11-14 DIAGNOSIS — I10 HYPERTENSION, UNSPECIFIED TYPE: ICD-10-CM

## 2022-11-14 DIAGNOSIS — E11.65 UNCONTROLLED TYPE 2 DIABETES MELLITUS WITH HYPERGLYCEMIA: ICD-10-CM

## 2022-11-14 DIAGNOSIS — E78.5 HYPERLIPIDEMIA, UNSPECIFIED HYPERLIPIDEMIA TYPE: ICD-10-CM

## 2022-11-14 LAB — HBA1C MFR BLD: 7.4 %

## 2022-11-14 PROCEDURE — G0008 ADMIN INFLUENZA VIRUS VAC: HCPCS | Mod: S$GLB,,, | Performed by: NURSE PRACTITIONER

## 2022-11-14 PROCEDURE — 1159F PR MEDICATION LIST DOCUMENTED IN MEDICAL RECORD: ICD-10-PCS | Mod: CPTII,S$GLB,, | Performed by: NURSE PRACTITIONER

## 2022-11-14 PROCEDURE — 3075F SYST BP GE 130 - 139MM HG: CPT | Mod: CPTII,S$GLB,, | Performed by: NURSE PRACTITIONER

## 2022-11-14 PROCEDURE — 3051F PR MOST RECENT HEMOGLOBIN A1C LEVEL 7.0 - < 8.0%: ICD-10-PCS | Mod: CPTII,S$GLB,, | Performed by: NURSE PRACTITIONER

## 2022-11-14 PROCEDURE — 1160F PR REVIEW ALL MEDS BY PRESCRIBER/CLIN PHARMACIST DOCUMENTED: ICD-10-PCS | Mod: CPTII,S$GLB,, | Performed by: NURSE PRACTITIONER

## 2022-11-14 PROCEDURE — 3078F DIAST BP <80 MM HG: CPT | Mod: CPTII,S$GLB,, | Performed by: NURSE PRACTITIONER

## 2022-11-14 PROCEDURE — 3066F PR DOCUMENTATION OF TREATMENT FOR NEPHROPATHY: ICD-10-PCS | Mod: CPTII,S$GLB,, | Performed by: NURSE PRACTITIONER

## 2022-11-14 PROCEDURE — 3078F PR MOST RECENT DIASTOLIC BLOOD PRESSURE < 80 MM HG: ICD-10-PCS | Mod: CPTII,S$GLB,, | Performed by: NURSE PRACTITIONER

## 2022-11-14 PROCEDURE — 3060F POS MICROALBUMINURIA REV: CPT | Mod: CPTII,S$GLB,, | Performed by: NURSE PRACTITIONER

## 2022-11-14 PROCEDURE — 3060F PR POS MICROALBUMINURIA RESULT DOCUMENTED/REVIEW: ICD-10-PCS | Mod: CPTII,S$GLB,, | Performed by: NURSE PRACTITIONER

## 2022-11-14 PROCEDURE — 3075F PR MOST RECENT SYSTOLIC BLOOD PRESS GE 130-139MM HG: ICD-10-PCS | Mod: CPTII,S$GLB,, | Performed by: NURSE PRACTITIONER

## 2022-11-14 PROCEDURE — 99214 OFFICE O/P EST MOD 30 MIN: CPT | Mod: 25,S$GLB,, | Performed by: NURSE PRACTITIONER

## 2022-11-14 PROCEDURE — 3066F NEPHROPATHY DOC TX: CPT | Mod: CPTII,S$GLB,, | Performed by: NURSE PRACTITIONER

## 2022-11-14 PROCEDURE — G0008 FLU VACCINE - QUADRIVALENT - HIGH DOSE (65+) PRESERVATIVE FREE IM: ICD-10-PCS | Mod: S$GLB,,, | Performed by: NURSE PRACTITIONER

## 2022-11-14 PROCEDURE — 1159F MED LIST DOCD IN RCRD: CPT | Mod: CPTII,S$GLB,, | Performed by: NURSE PRACTITIONER

## 2022-11-14 PROCEDURE — 4010F ACE/ARB THERAPY RXD/TAKEN: CPT | Mod: CPTII,S$GLB,, | Performed by: NURSE PRACTITIONER

## 2022-11-14 PROCEDURE — 4010F PR ACE/ARB THEARPY RXD/TAKEN: ICD-10-PCS | Mod: CPTII,S$GLB,, | Performed by: NURSE PRACTITIONER

## 2022-11-14 PROCEDURE — 3008F PR BODY MASS INDEX (BMI) DOCUMENTED: ICD-10-PCS | Mod: CPTII,S$GLB,, | Performed by: NURSE PRACTITIONER

## 2022-11-14 PROCEDURE — 90662 FLU VACCINE - QUADRIVALENT - HIGH DOSE (65+) PRESERVATIVE FREE IM: ICD-10-PCS | Mod: S$GLB,,, | Performed by: NURSE PRACTITIONER

## 2022-11-14 PROCEDURE — 3008F BODY MASS INDEX DOCD: CPT | Mod: CPTII,S$GLB,, | Performed by: NURSE PRACTITIONER

## 2022-11-14 PROCEDURE — 1160F RVW MEDS BY RX/DR IN RCRD: CPT | Mod: CPTII,S$GLB,, | Performed by: NURSE PRACTITIONER

## 2022-11-14 PROCEDURE — 83036 POCT HEMOGLOBIN A1C: ICD-10-PCS | Mod: QW,,, | Performed by: NURSE PRACTITIONER

## 2022-11-14 PROCEDURE — 99214 PR OFFICE/OUTPT VISIT, EST, LEVL IV, 30-39 MIN: ICD-10-PCS | Mod: 25,S$GLB,, | Performed by: NURSE PRACTITIONER

## 2022-11-14 PROCEDURE — 77063 BREAST TOMOSYNTHESIS BI: CPT | Mod: TC,PO

## 2022-11-14 PROCEDURE — 83036 HEMOGLOBIN GLYCOSYLATED A1C: CPT | Mod: QW,,, | Performed by: NURSE PRACTITIONER

## 2022-11-14 PROCEDURE — 3051F HG A1C>EQUAL 7.0%<8.0%: CPT | Mod: CPTII,S$GLB,, | Performed by: NURSE PRACTITIONER

## 2022-11-14 PROCEDURE — 90662 IIV NO PRSV INCREASED AG IM: CPT | Mod: S$GLB,,, | Performed by: NURSE PRACTITIONER

## 2022-11-14 RX ORDER — MELOXICAM 15 MG/1
15 TABLET ORAL DAILY
Qty: 90 TABLET | Refills: 1 | Status: SHIPPED | OUTPATIENT
Start: 2022-11-14 | End: 2023-06-27 | Stop reason: SDUPTHER

## 2022-11-14 RX ORDER — PANTOPRAZOLE SODIUM 40 MG/1
40 TABLET, DELAYED RELEASE ORAL DAILY
Qty: 30 TABLET | Refills: 11 | Status: SHIPPED | OUTPATIENT
Start: 2022-11-14 | End: 2023-10-04 | Stop reason: SDUPTHER

## 2022-11-14 RX ORDER — CITALOPRAM 10 MG/1
10 TABLET ORAL DAILY
Qty: 30 TABLET | Refills: 11 | Status: SHIPPED | OUTPATIENT
Start: 2022-11-14 | End: 2023-03-01

## 2022-11-14 RX ORDER — FAMOTIDINE 40 MG/1
40 TABLET, FILM COATED ORAL DAILY
Qty: 30 TABLET | Refills: 11 | Status: SHIPPED | OUTPATIENT
Start: 2022-11-14 | End: 2024-02-27

## 2022-11-14 RX ORDER — HYDROCHLOROTHIAZIDE 12.5 MG/1
25 TABLET ORAL DAILY
Qty: 180 TABLET | Refills: 1 | Status: SHIPPED | OUTPATIENT
Start: 2022-11-14 | End: 2023-06-27 | Stop reason: SDUPTHER

## 2022-11-14 RX ORDER — METFORMIN HYDROCHLORIDE 500 MG/1
500 TABLET ORAL 2 TIMES DAILY
Qty: 180 TABLET | Refills: 1 | Status: SHIPPED | OUTPATIENT
Start: 2022-11-14 | End: 2023-06-27 | Stop reason: SDUPTHER

## 2022-11-14 RX ORDER — HYDRALAZINE HYDROCHLORIDE 25 MG/1
25 TABLET, FILM COATED ORAL 2 TIMES DAILY
Qty: 60 TABLET | Refills: 5 | Status: SHIPPED | OUTPATIENT
Start: 2022-11-14 | End: 2023-03-01 | Stop reason: SDUPTHER

## 2022-11-14 RX ORDER — ATORVASTATIN CALCIUM 40 MG/1
60 TABLET, FILM COATED ORAL DAILY
Qty: 135 TABLET | Refills: 1 | Status: SHIPPED | OUTPATIENT
Start: 2022-11-14 | End: 2023-03-01 | Stop reason: SDUPTHER

## 2022-11-14 RX ORDER — LOSARTAN POTASSIUM 100 MG/1
100 TABLET ORAL DAILY
Qty: 30 TABLET | Refills: 5 | Status: SHIPPED | OUTPATIENT
Start: 2022-11-14 | End: 2023-03-01 | Stop reason: SDUPTHER

## 2022-11-14 RX ORDER — POTASSIUM CHLORIDE 750 MG/1
10 CAPSULE, EXTENDED RELEASE ORAL DAILY
Qty: 90 CAPSULE | Refills: 1 | Status: SHIPPED | OUTPATIENT
Start: 2022-11-14 | End: 2023-06-27 | Stop reason: SDUPTHER

## 2022-11-15 LAB
ALBUMIN SERPL-MCNC: 4.9 G/DL (ref 3.6–5.1)
ALBUMIN/GLOB SERPL: 2.5 (CALC) (ref 1–2.5)
ALP SERPL-CCNC: 71 U/L (ref 37–153)
ALT SERPL-CCNC: 63 U/L (ref 6–29)
AST SERPL-CCNC: 34 U/L (ref 10–35)
BILIRUB SERPL-MCNC: 0.9 MG/DL (ref 0.2–1.2)
BUN SERPL-MCNC: 30 MG/DL (ref 7–25)
BUN/CREAT SERPL: 28 (CALC) (ref 6–22)
CALCIUM SERPL-MCNC: 10.1 MG/DL (ref 8.6–10.4)
CHLORIDE SERPL-SCNC: 99 MMOL/L (ref 98–110)
CHOLEST SERPL-MCNC: 206 MG/DL
CHOLEST/HDLC SERPL: 4.1 (CALC)
CO2 SERPL-SCNC: 28 MMOL/L (ref 20–32)
CREAT SERPL-MCNC: 1.09 MG/DL (ref 0.6–1)
EGFR: 54 ML/MIN/1.73M2
GLOBULIN SER CALC-MCNC: 2 G/DL (CALC) (ref 1.9–3.7)
GLUCOSE SERPL-MCNC: 158 MG/DL (ref 65–99)
HDLC SERPL-MCNC: 50 MG/DL
LDLC SERPL CALC-MCNC: 114 MG/DL (CALC)
NONHDLC SERPL-MCNC: 156 MG/DL (CALC)
POTASSIUM SERPL-SCNC: 4.4 MMOL/L (ref 3.5–5.3)
PROT SERPL-MCNC: 6.9 G/DL (ref 6.1–8.1)
SODIUM SERPL-SCNC: 138 MMOL/L (ref 135–146)
TRIGL SERPL-MCNC: 315 MG/DL

## 2022-12-03 NOTE — PROGRESS NOTES
SUBJECTIVE:    Patient ID: Kay Valencia is a 72 y.o. female.    Chief Complaint: Diabetes (3mth, brought bottles, Flu wants// SW)    72-year-old female presents for check up. She is treated for diabetes, htn, oa, anxiety, insomnia, she is taking medications as prescribed. bp in office is well controlled. Does not check bp at home. a1c is 7.4mg/dl. this has increased since last visit. She is not watching diet. She is Under some stress. 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 Does not get much help from family.       Office Visit on 11/14/2022   Component Date Value Ref Range Status    Hemoglobin A1C 11/14/2022 7.4  % Final    Glucose 11/14/2022 158 (H)  65 - 99 mg/dL Final    BUN 11/14/2022 30 (H)  7 - 25 mg/dL Final    Creatinine 11/14/2022 1.09 (H)  0.60 - 1.00 mg/dL Final    eGFR 11/14/2022 54 (L)  > OR = 60 mL/min/1.73m2 Final    BUN/Creatinine Ratio 11/14/2022 28 (H)  6 - 22 (calc) Final    Sodium 11/14/2022 138  135 - 146 mmol/L Final    Potassium 11/14/2022 4.4  3.5 - 5.3 mmol/L Final    Chloride 11/14/2022 99  98 - 110 mmol/L Final    CO2 11/14/2022 28  20 - 32 mmol/L Final    Calcium 11/14/2022 10.1  8.6 - 10.4 mg/dL Final    Total Protein 11/14/2022 6.9  6.1 - 8.1 g/dL Final    Albumin 11/14/2022 4.9  3.6 - 5.1 g/dL Final    Globulin, Total 11/14/2022 2.0  1.9 - 3.7 g/dL (calc) Final    Albumin/Globulin Ratio 11/14/2022 2.5  1.0 - 2.5 (calc) Final    Total Bilirubin 11/14/2022 0.9  0.2 - 1.2 mg/dL Final    Alkaline Phosphatase 11/14/2022 71  37 - 153 U/L Final    AST 11/14/2022 34  10 - 35 U/L Final    ALT 11/14/2022 63 (H)  6 - 29 U/L Final    Cholesterol 11/14/2022 206 (H)  <200 mg/dL Final    HDL 11/14/2022 50  > OR = 50 mg/dL Final    Triglycerides 11/14/2022 315 (H)  <150 mg/dL Final    LDL Cholesterol 11/14/2022 114 (H)  mg/dL (calc) Final    HDL/Cholesterol Ratio 11/14/2022 4.1  <5.0 (calc) Final    Non HDL Chol. (LDL+VLDL) 11/14/2022 156 (H)  <130 mg/dL (calc) Final   Office Visit on 08/02/2022   Component  Date Value Ref Range Status    Hemoglobin A1C 08/05/2022 6.7  % Final       Past Medical History:   Diagnosis Date    Anxiety     Depression     Diabetes mellitus, type 2     GERD (gastroesophageal reflux disease)     Hyperlipidemia     Hypertension      Social History     Socioeconomic History    Marital status:    Tobacco Use    Smoking status: Former    Smokeless tobacco: Never   Substance and Sexual Activity    Alcohol use: No    Drug use: No    Sexual activity: Yes     Past Surgical History:   Procedure Laterality Date    BREAST BIOPSY Left 1990'S    BENIGN    HYSTERECTOMY      INCONTINENCE SURGERY       Family History   Problem Relation Age of Onset    Diabetes Maternal Grandmother     Hypertension Maternal Grandmother     Diabetes Father     Breast cancer Mother 57    Hypertension Mother     Diabetes Sister     Ovarian cancer Neg Hx        Review of patient's allergies indicates:   Allergen Reactions    Flexeril [cyclobenzaprine] Other (See Comments)     Unknown was a long time ago.    Keflex [cephalexin] Diarrhea    Sulfa (sulfonamide antibiotics) Nausea Only    Augmentin [amoxicillin-pot clavulanate] Other (See Comments)     Gas       Current Outpatient Medications:     ALPRAZolam (XANAX) 1 MG tablet, Take 1 tablet (1 mg total) by mouth every evening., Disp: 30 tablet, Rfl: 2    blood sugar diagnostic Strp, To check BG 1 times daily, to use with insurance preferred meter, Disp: 200 strip, Rfl: 1    blood-glucose meter kit, To check BG 1 times daily, to use with insurance preferred meter, Disp: 1 each, Rfl: 0    calcium carbonate/vitamin D3 (CALTRATE 600 PLUS D ORAL), Caltrate 600 plus D, Disp: , Rfl:     estradioL (ESTRACE) 0.01 % (0.1 mg/gram) vaginal cream, Place 1 g vaginally every Mon, Wed, Fri., Disp: 12 g, Rfl: 11    HYDROcodone-acetaminophen (NORCO)  mg per tablet, Take 1 tablet by mouth every 8 (eight) hours as needed for Pain., Disp: 30 tablet, Rfl: 0    hydrocortisone 1 % ointment,  Apply topically 2 (two) times daily., Disp: 57 g, Rfl: 0    Lactobacillus rhamnosus GG (CULTURELLE) 10 billion cell capsule, Take 1 capsule by mouth once daily., Disp: , Rfl:     lancets 33 gauge Misc, 1 lancet by Misc.(Non-Drug; Combo Route) route 3 (three) times a week., Disp: 100 each, Rfl: 3    lancets Misc, To check BG 1 times daily, to use with insurance preferred meter, Disp: 200 each, Rfl: 1    melatonin 5 mg Cap, Take by mouth., Disp: , Rfl:     olmesartan-hydrochlorothiazide (BENICAR HCT) 40-12.5 mg Tab, Take 1 tablet by mouth once daily., Disp: 90 tablet, Rfl: 3    omega 3-dha-epa-fish oil 100-160-1,000 mg Cap, Fish Oil, Disp: , Rfl:     traZODone (DESYREL) 50 MG tablet, Take 1 tablet (50 mg total) by mouth every evening., Disp: 30 tablet, Rfl: 1    atorvastatin (LIPITOR) 40 MG tablet, Take 1.5 tablets (60 mg total) by mouth once daily., Disp: 135 tablet, Rfl: 1    citalopram (CELEXA) 10 MG tablet, Take 1 tablet (10 mg total) by mouth once daily., Disp: 30 tablet, Rfl: 11    famotidine (PEPCID) 40 MG tablet, Take 1 tablet (40 mg total) by mouth once daily., Disp: 30 tablet, Rfl: 11    hydrALAZINE (APRESOLINE) 25 MG tablet, Take 1 tablet (25 mg total) by mouth 2 (two) times a day., Disp: 60 tablet, Rfl: 5    hydroCHLOROthiazide (HYDRODIURIL) 12.5 MG Tab, Take 2 tablets (25 mg total) by mouth once daily., Disp: 180 tablet, Rfl: 1    losartan (COZAAR) 100 MG tablet, Take 1 tablet (100 mg total) by mouth once daily., Disp: 30 tablet, Rfl: 5    meloxicam (MOBIC) 15 MG tablet, Take 1 tablet (15 mg total) by mouth once daily., Disp: 90 tablet, Rfl: 1    metFORMIN (GLUCOPHAGE) 500 MG tablet, Take 1 tablet (500 mg total) by mouth 2 (two) times daily., Disp: 180 tablet, Rfl: 1    pantoprazole (PROTONIX) 40 MG tablet, Take 1 tablet (40 mg total) by mouth once daily., Disp: 30 tablet, Rfl: 11    potassium chloride (MICRO-K) 10 MEQ CpSR, Take 1 capsule (10 mEq total) by mouth once daily., Disp: 90 capsule, Rfl:  "1    Review of Systems   Constitutional:  Negative for chills, fever and unexpected weight change.   HENT:  Negative for ear pain, rhinorrhea and sore throat.    Eyes:  Negative for pain and visual disturbance.   Respiratory:  Negative for cough and shortness of breath.    Cardiovascular:  Negative for chest pain, palpitations and leg swelling.   Gastrointestinal:  Negative for abdominal pain, diarrhea, nausea and vomiting.   Genitourinary:  Negative for difficulty urinating, hematuria and vaginal bleeding.   Musculoskeletal:  Negative for arthralgias.   Skin:  Negative for rash.   Neurological:  Negative for dizziness, weakness and headaches.   Psychiatric/Behavioral:  Negative for agitation and sleep disturbance. The patient is not nervous/anxious.         Objective:      Vitals:    11/14/22 1107   BP: 134/68   Pulse: 68   Weight: 65.3 kg (144 lb)   Height: 5' 1" (1.549 m)     Physical Exam  Vitals and nursing note reviewed.   Constitutional:       Appearance: Normal appearance. She is well-developed.   HENT:      Right Ear: External ear normal.      Left Ear: External ear normal.   Eyes:      Pupils: Pupils are equal, round, and reactive to light.   Cardiovascular:      Rate and Rhythm: Normal rate and regular rhythm.      Pulses:           Dorsalis pedis pulses are 2+ on the right side.        Posterior tibial pulses are 2+ on the right side.      Heart sounds: Normal heart sounds.   Pulmonary:      Effort: Pulmonary effort is normal.      Breath sounds: Normal breath sounds.   Abdominal:      General: Bowel sounds are normal.      Palpations: Abdomen is soft.   Musculoskeletal:         General: No deformity. Normal range of motion.      Cervical back: Normal range of motion and neck supple.      Right foot: Normal range of motion. No deformity.   Feet:      Right foot:      Protective Sensation: 5 sites tested.  5 sites sensed.      Left foot:      Protective Sensation: 5 sites tested.  5 sites sensed.      " Skin integrity: No skin breakdown.   Lymphadenopathy:      Cervical: No cervical adenopathy.   Skin:     General: Skin is warm and dry.   Neurological:      Mental Status: She is alert and oriented to person, place, and time.   Psychiatric:         Behavior: Behavior normal.         Assessment:       1. Need for influenza vaccination    2. Hypertension, unspecified type    3. Uncontrolled type 2 diabetes mellitus with hyperglycemia    4. Hyperlipidemia, unspecified hyperlipidemia type    5. Uncontrolled type 2 diabetes mellitus with hyperglycemia    6. Essential hypertension    7. Gastroesophageal reflux disease, unspecified whether esophagitis present    8. High risk medication use           Plan:       Need for influenza vaccination  -     Influenza - Quadrivalent - High Dose (65+) (PF) (IM)    Hypertension, unspecified type  -     hydroCHLOROthiazide (HYDRODIURIL) 12.5 MG Tab; Take 2 tablets (25 mg total) by mouth once daily.  Dispense: 180 tablet; Refill: 1    Uncontrolled type 2 diabetes mellitus with hyperglycemia  -     hydroCHLOROthiazide (HYDRODIURIL) 12.5 MG Tab; Take 2 tablets (25 mg total) by mouth once daily.  Dispense: 180 tablet; Refill: 1  -     atorvastatin (LIPITOR) 40 MG tablet; Take 1.5 tablets (60 mg total) by mouth once daily.  Dispense: 135 tablet; Refill: 1  -     metFORMIN (GLUCOPHAGE) 500 MG tablet; Take 1 tablet (500 mg total) by mouth 2 (two) times daily.  Dispense: 180 tablet; Refill: 1  -     Hemoglobin A1C, POCT    Hyperlipidemia, unspecified hyperlipidemia type  -     atorvastatin (LIPITOR) 40 MG tablet; Take 1.5 tablets (60 mg total) by mouth once daily.  Dispense: 135 tablet; Refill: 1  -     Comprehensive Metabolic Panel; Future; Expected date: 11/14/2022  -     Lipid Panel; Future; Expected date: 11/14/2022    Uncontrolled type 2 diabetes mellitus with hyperglycemia  Comments:  start diet. increase activity  Orders:  -     hydroCHLOROthiazide (HYDRODIURIL) 12.5 MG Tab; Take 2  tablets (25 mg total) by mouth once daily.  Dispense: 180 tablet; Refill: 1  -     atorvastatin (LIPITOR) 40 MG tablet; Take 1.5 tablets (60 mg total) by mouth once daily.  Dispense: 135 tablet; Refill: 1  -     metFORMIN (GLUCOPHAGE) 500 MG tablet; Take 1 tablet (500 mg total) by mouth 2 (two) times daily.  Dispense: 180 tablet; Refill: 1  -     Hemoglobin A1C, POCT    Essential hypertension  -     hydrALAZINE (APRESOLINE) 25 MG tablet; Take 1 tablet (25 mg total) by mouth 2 (two) times a day.  Dispense: 60 tablet; Refill: 5  -     losartan (COZAAR) 100 MG tablet; Take 1 tablet (100 mg total) by mouth once daily.  Dispense: 30 tablet; Refill: 5    Gastroesophageal reflux disease, unspecified whether esophagitis present  -     famotidine (PEPCID) 40 MG tablet; Take 1 tablet (40 mg total) by mouth once daily.  Dispense: 30 tablet; Refill: 11  -     pantoprazole (PROTONIX) 40 MG tablet; Take 1 tablet (40 mg total) by mouth once daily.  Dispense: 30 tablet; Refill: 11    High risk medication use  -     Comprehensive Metabolic Panel; Future; Expected date: 11/14/2022  -     Lipid Panel; Future; Expected date: 11/14/2022    Other orders  -     meloxicam (MOBIC) 15 MG tablet; Take 1 tablet (15 mg total) by mouth once daily.  Dispense: 90 tablet; Refill: 1  -     potassium chloride (MICRO-K) 10 MEQ CpSR; Take 1 capsule (10 mEq total) by mouth once daily.  Dispense: 90 capsule; Refill: 1  -     citalopram (CELEXA) 10 MG tablet; Take 1 tablet (10 mg total) by mouth once daily.  Dispense: 30 tablet; Refill: 11    Follow up in about 4 weeks (around 12/12/2022), or if symptoms worsen or fail to improve, for medication management.        12/3/2022 Karolina Ramirez

## 2023-01-23 ENCOUNTER — TELEPHONE (OUTPATIENT)
Dept: FAMILY MEDICINE | Facility: CLINIC | Age: 73
End: 2023-01-23

## 2023-01-23 ENCOUNTER — OFFICE VISIT (OUTPATIENT)
Dept: FAMILY MEDICINE | Facility: CLINIC | Age: 73
End: 2023-01-23
Payer: MEDICARE

## 2023-01-23 VITALS
DIASTOLIC BLOOD PRESSURE: 70 MMHG | HEIGHT: 61 IN | HEART RATE: 100 BPM | TEMPERATURE: 99 F | OXYGEN SATURATION: 97 % | BODY MASS INDEX: 26.77 KG/M2 | SYSTOLIC BLOOD PRESSURE: 138 MMHG | WEIGHT: 141.81 LBS

## 2023-01-23 DIAGNOSIS — E11.65 UNCONTROLLED TYPE 2 DIABETES MELLITUS WITH HYPERGLYCEMIA: ICD-10-CM

## 2023-01-23 DIAGNOSIS — I10 ESSENTIAL HYPERTENSION: ICD-10-CM

## 2023-01-23 DIAGNOSIS — U07.1 COVID-19: ICD-10-CM

## 2023-01-23 DIAGNOSIS — R05.9 COUGH, UNSPECIFIED TYPE: Primary | ICD-10-CM

## 2023-01-23 DIAGNOSIS — U07.1 COVID-19 VIRUS DETECTED: ICD-10-CM

## 2023-01-23 LAB
CTP QC/QA: YES
FLUAV AG NPH QL: NEGATIVE
FLUBV AG NPH QL: NEGATIVE
S PYO RRNA THROAT QL PROBE: NEGATIVE
SARS-COV-2 RDRP RESP QL NAA+PROBE: POSITIVE

## 2023-01-23 PROCEDURE — 87804 POCT INFLUENZA A/B: ICD-10-PCS | Mod: QW,,, | Performed by: NURSE PRACTITIONER

## 2023-01-23 PROCEDURE — 1160F RVW MEDS BY RX/DR IN RCRD: CPT | Mod: CPTII,S$GLB,, | Performed by: NURSE PRACTITIONER

## 2023-01-23 PROCEDURE — 3008F PR BODY MASS INDEX (BMI) DOCUMENTED: ICD-10-PCS | Mod: CPTII,S$GLB,, | Performed by: NURSE PRACTITIONER

## 2023-01-23 PROCEDURE — 1160F PR REVIEW ALL MEDS BY PRESCRIBER/CLIN PHARMACIST DOCUMENTED: ICD-10-PCS | Mod: CPTII,S$GLB,, | Performed by: NURSE PRACTITIONER

## 2023-01-23 PROCEDURE — 3008F BODY MASS INDEX DOCD: CPT | Mod: CPTII,S$GLB,, | Performed by: NURSE PRACTITIONER

## 2023-01-23 PROCEDURE — 99213 OFFICE O/P EST LOW 20 MIN: CPT | Mod: S$GLB,,, | Performed by: NURSE PRACTITIONER

## 2023-01-23 PROCEDURE — 99213 PR OFFICE/OUTPT VISIT, EST, LEVL III, 20-29 MIN: ICD-10-PCS | Mod: S$GLB,,, | Performed by: NURSE PRACTITIONER

## 2023-01-23 PROCEDURE — 1159F PR MEDICATION LIST DOCUMENTED IN MEDICAL RECORD: ICD-10-PCS | Mod: CPTII,S$GLB,, | Performed by: NURSE PRACTITIONER

## 2023-01-23 PROCEDURE — 87880 STREP A ASSAY W/OPTIC: CPT | Mod: QW,,, | Performed by: NURSE PRACTITIONER

## 2023-01-23 PROCEDURE — 87804 INFLUENZA ASSAY W/OPTIC: CPT | Mod: QW,,, | Performed by: NURSE PRACTITIONER

## 2023-01-23 PROCEDURE — 3288F FALL RISK ASSESSMENT DOCD: CPT | Mod: CPTII,S$GLB,, | Performed by: NURSE PRACTITIONER

## 2023-01-23 PROCEDURE — 1159F MED LIST DOCD IN RCRD: CPT | Mod: CPTII,S$GLB,, | Performed by: NURSE PRACTITIONER

## 2023-01-23 PROCEDURE — 87635 SARS-COV-2 COVID-19 AMP PRB: CPT | Mod: QW,S$GLB,, | Performed by: NURSE PRACTITIONER

## 2023-01-23 PROCEDURE — 3075F SYST BP GE 130 - 139MM HG: CPT | Mod: CPTII,S$GLB,, | Performed by: NURSE PRACTITIONER

## 2023-01-23 PROCEDURE — 3288F PR FALLS RISK ASSESSMENT DOCUMENTED: ICD-10-PCS | Mod: CPTII,S$GLB,, | Performed by: NURSE PRACTITIONER

## 2023-01-23 PROCEDURE — 3078F DIAST BP <80 MM HG: CPT | Mod: CPTII,S$GLB,, | Performed by: NURSE PRACTITIONER

## 2023-01-23 PROCEDURE — 87880 POCT RAPID STREP A: ICD-10-PCS | Mod: QW,,, | Performed by: NURSE PRACTITIONER

## 2023-01-23 PROCEDURE — 1101F PR PT FALLS ASSESS DOC 0-1 FALLS W/OUT INJ PAST YR: ICD-10-PCS | Mod: CPTII,S$GLB,, | Performed by: NURSE PRACTITIONER

## 2023-01-23 PROCEDURE — 3078F PR MOST RECENT DIASTOLIC BLOOD PRESSURE < 80 MM HG: ICD-10-PCS | Mod: CPTII,S$GLB,, | Performed by: NURSE PRACTITIONER

## 2023-01-23 PROCEDURE — 3075F PR MOST RECENT SYSTOLIC BLOOD PRESS GE 130-139MM HG: ICD-10-PCS | Mod: CPTII,S$GLB,, | Performed by: NURSE PRACTITIONER

## 2023-01-23 PROCEDURE — 87635: ICD-10-PCS | Mod: QW,S$GLB,, | Performed by: NURSE PRACTITIONER

## 2023-01-23 PROCEDURE — 1101F PT FALLS ASSESS-DOCD LE1/YR: CPT | Mod: CPTII,S$GLB,, | Performed by: NURSE PRACTITIONER

## 2023-01-23 NOTE — TELEPHONE ENCOUNTER
----- Message from Kaley Deleon sent at 1/23/2023  8:32 AM CST -----  Patient called and would like to see if she can come in today she is unable to talk and she has a sore throat please give her a call at [phone]

## 2023-01-23 NOTE — PROGRESS NOTES
SUBJECTIVE:    Patient ID: Kay Valencia is a 72 y.o. female.    Chief Complaint: Cough, Generalized Body Aches, Chills, and Sore Throat (X couple days, burning and pain)    72-year-old female presents for urgent visit. She is treated for diabetes, htn, oa, anxiety, insomnia, sshe reports that she started with sore throat Thursday pm. No fever. Mild congestion. No aware of any sick contacts. Has not taken anything.       Office Visit on 01/23/2023   Component Date Value Ref Range Status    POC Rapid COVID 01/23/2023 Positive (A)  Negative Final     Acceptable 01/23/2023 Yes   Final    Rapid Influenza A Ag 01/23/2023 Negative  Negative Final    Rapid Influenza B Ag 01/23/2023 Negative  Negative Final     Acceptable 01/23/2023 Yes   Final    Rapid Strep A Screen 01/23/2023 Negative  Negative Final     Acceptable 01/23/2023 Yes   Final   Office Visit on 11/14/2022   Component Date Value Ref Range Status    Hemoglobin A1C, POC 11/14/2022 7.4  % Final    Glucose 11/14/2022 158 (H)  65 - 99 mg/dL Final    BUN 11/14/2022 30 (H)  7 - 25 mg/dL Final    Creatinine 11/14/2022 1.09 (H)  0.60 - 1.00 mg/dL Final    eGFR 11/14/2022 54 (L)  > OR = 60 mL/min/1.73m2 Final    BUN/Creatinine Ratio 11/14/2022 28 (H)  6 - 22 (calc) Final    Sodium 11/14/2022 138  135 - 146 mmol/L Final    Potassium 11/14/2022 4.4  3.5 - 5.3 mmol/L Final    Chloride 11/14/2022 99  98 - 110 mmol/L Final    CO2 11/14/2022 28  20 - 32 mmol/L Final    Calcium 11/14/2022 10.1  8.6 - 10.4 mg/dL Final    Total Protein 11/14/2022 6.9  6.1 - 8.1 g/dL Final    Albumin 11/14/2022 4.9  3.6 - 5.1 g/dL Final    Globulin, Total 11/14/2022 2.0  1.9 - 3.7 g/dL (calc) Final    Albumin/Globulin Ratio 11/14/2022 2.5  1.0 - 2.5 (calc) Final    Total Bilirubin 11/14/2022 0.9  0.2 - 1.2 mg/dL Final    Alkaline Phosphatase 11/14/2022 71  37 - 153 U/L Final    AST 11/14/2022 34  10 - 35 U/L Final    ALT  11/14/2022 63 (H)  6 - 29 U/L Final    Cholesterol 11/14/2022 206 (H)  <200 mg/dL Final    HDL 11/14/2022 50  > OR = 50 mg/dL Final    Triglycerides 11/14/2022 315 (H)  <150 mg/dL Final    LDL Cholesterol 11/14/2022 114 (H)  mg/dL (calc) Final    HDL/Cholesterol Ratio 11/14/2022 4.1  <5.0 (calc) Final    Non HDL Chol. (LDL+VLDL) 11/14/2022 156 (H)  <130 mg/dL (calc) Final   Office Visit on 08/02/2022   Component Date Value Ref Range Status    Hemoglobin A1C, POC 08/05/2022 6.7  % Final       Past Medical History:   Diagnosis Date    Anxiety     Depression     Diabetes mellitus, type 2     GERD (gastroesophageal reflux disease)     Hyperlipidemia     Hypertension      Social History     Socioeconomic History    Marital status:    Tobacco Use    Smoking status: Former    Smokeless tobacco: Never   Substance and Sexual Activity    Alcohol use: No    Drug use: No    Sexual activity: Yes     Past Surgical History:   Procedure Laterality Date    BREAST BIOPSY Left 1990'S    BENIGN    HYSTERECTOMY      INCONTINENCE SURGERY       Family History   Problem Relation Age of Onset    Diabetes Maternal Grandmother     Hypertension Maternal Grandmother     Diabetes Father     Breast cancer Mother 57    Hypertension Mother     Diabetes Sister     Ovarian cancer Neg Hx        Review of patient's allergies indicates:   Allergen Reactions    Flexeril [cyclobenzaprine] Other (See Comments)     Unknown was a long time ago.    Keflex [cephalexin] Diarrhea    Sulfa (sulfonamide antibiotics) Nausea Only    Augmentin [amoxicillin-pot clavulanate] Other (See Comments)     Gas       Current Outpatient Medications:     ALPRAZolam (XANAX) 1 MG tablet, Take 1 tablet (1 mg total) by mouth every evening., Disp: 30 tablet, Rfl: 2    atorvastatin (LIPITOR) 40 MG tablet, Take 1.5 tablets (60 mg total) by mouth once daily., Disp: 135 tablet, Rfl: 1    blood sugar diagnostic Strp, To check BG 1 times daily, to  use with insurance preferred meter, Disp: 200 strip, Rfl: 1    blood-glucose meter kit, To check BG 1 times daily, to use with insurance preferred meter, Disp: 1 each, Rfl: 0    calcium carbonate/vitamin D3 (CALTRATE 600 PLUS D ORAL), Caltrate 600 plus D, Disp: , Rfl:     citalopram (CELEXA) 10 MG tablet, Take 1 tablet (10 mg total) by mouth once daily., Disp: 30 tablet, Rfl: 11    estradioL (ESTRACE) 0.01 % (0.1 mg/gram) vaginal cream, Place 1 g vaginally every Mon, Wed, Fri., Disp: 12 g, Rfl: 11    famotidine (PEPCID) 40 MG tablet, Take 1 tablet (40 mg total) by mouth once daily., Disp: 30 tablet, Rfl: 11    hydrALAZINE (APRESOLINE) 25 MG tablet, Take 1 tablet (25 mg total) by mouth 2 (two) times a day., Disp: 60 tablet, Rfl: 5    hydroCHLOROthiazide (HYDRODIURIL) 12.5 MG Tab, Take 2 tablets (25 mg total) by mouth once daily., Disp: 180 tablet, Rfl: 1    HYDROcodone-acetaminophen (NORCO)  mg per tablet, Take 1 tablet by mouth every 8 (eight) hours as needed for Pain., Disp: 30 tablet, Rfl: 0    hydrocortisone 1 % ointment, Apply topically 2 (two) times daily., Disp: 57 g, Rfl: 0    Lactobacillus rhamnosus GG (CULTURELLE) 10 billion cell capsule, Take 1 capsule by mouth once daily., Disp: , Rfl:     lancets 33 gauge Misc, 1 lancet by Misc.(Non-Drug; Combo Route) route 3 (three) times a week., Disp: 100 each, Rfl: 3    lancets Misc, To check BG 1 times daily, to use with insurance preferred meter, Disp: 200 each, Rfl: 1    losartan (COZAAR) 100 MG tablet, Take 1 tablet (100 mg total) by mouth once daily., Disp: 30 tablet, Rfl: 5    melatonin 5 mg Cap, Take by mouth., Disp: , Rfl:     meloxicam (MOBIC) 15 MG tablet, Take 1 tablet (15 mg total) by mouth once daily., Disp: 90 tablet, Rfl: 1    metFORMIN (GLUCOPHAGE) 500 MG tablet, Take 1 tablet (500 mg total) by mouth 2 (two) times daily., Disp: 180 tablet, Rfl: 1    olmesartan-hydrochlorothiazide (BENICAR HCT) 40-12.5 mg Tab, Take 1 tablet by  "mouth once daily., Disp: 90 tablet, Rfl: 3    omega 3-dha-epa-fish oil 100-160-1,000 mg Cap, Fish Oil, Disp: , Rfl:     pantoprazole (PROTONIX) 40 MG tablet, Take 1 tablet (40 mg total) by mouth once daily., Disp: 30 tablet, Rfl: 11    potassium chloride (MICRO-K) 10 MEQ CpSR, Take 1 capsule (10 mEq total) by mouth once daily., Disp: 90 capsule, Rfl: 1    traZODone (DESYREL) 50 MG tablet, Take 1 tablet (50 mg total) by mouth every evening., Disp: 30 tablet, Rfl: 1    Review of Systems   Constitutional:  Negative for chills and fever.   HENT:  Positive for congestion, postnasal drip and sore throat. Negative for ear discharge, ear pain and sinus pressure.    Eyes:  Negative for discharge.   Respiratory:  Negative for cough and shortness of breath.    Cardiovascular:  Negative for chest pain and leg swelling.        Objective:      Vitals:    01/23/23 1224   BP: 138/70   Pulse: 100   Temp: 98.6 °F (37 °C)   TempSrc: Oral   SpO2: 97%   Weight: 64.3 kg (141 lb 12.8 oz)   Height: 5' 1" (1.549 m)     Physical Exam  Vitals and nursing note reviewed.   Constitutional:       General: She is not in acute distress.     Appearance: Normal appearance. She is well-developed. She is not ill-appearing.   HENT:      Right Ear: External ear normal.      Left Ear: External ear normal.      Nose:      Right Turbinates: Not enlarged.      Left Turbinates: Not enlarged.      Right Sinus: Frontal sinus tenderness present.      Left Sinus: Frontal sinus tenderness present.      Mouth/Throat:      Pharynx: Posterior oropharyngeal erythema present.      Tonsils: No tonsillar exudate.   Cardiovascular:      Rate and Rhythm: Normal rate and regular rhythm.      Heart sounds: Normal heart sounds.   Pulmonary:      Breath sounds: Normal breath sounds.   Lymphadenopathy:      Cervical: No cervical adenopathy.   Neurological:      Mental Status: She is alert.         Assessment:       1. Cough, unspecified type    2. Uncontrolled type 2 " diabetes mellitus with hyperglycemia    3. Essential hypertension    4. COVID-19           Plan:       Cough, unspecified type  -     POCT COVID-19 Rapid Screening  -     POCT Influenza A/B  -     POCT rapid strep A    Uncontrolled type 2 diabetes mellitus with hyperglycemia    Essential hypertension    COVID-19  Comments:  treat symptoms      Follow up if symptoms worsen or fail to improve, for medication management.        1/23/2023 Karolina Ramirez

## 2023-01-26 ENCOUNTER — TELEPHONE (OUTPATIENT)
Dept: FAMILY MEDICINE | Facility: CLINIC | Age: 73
End: 2023-01-26

## 2023-01-26 ENCOUNTER — NURSE TRIAGE (OUTPATIENT)
Dept: ADMINISTRATIVE | Facility: CLINIC | Age: 73
End: 2023-01-26
Payer: MEDICARE

## 2023-01-26 RX ORDER — AZITHROMYCIN 250 MG/1
TABLET, FILM COATED ORAL
Qty: 6 TABLET | Refills: 0 | Status: SHIPPED | OUTPATIENT
Start: 2023-01-26 | End: 2023-01-31

## 2023-01-26 RX ORDER — GUAIFENESIN 600 MG/1
1200 TABLET, EXTENDED RELEASE ORAL 2 TIMES DAILY
Qty: 20 TABLET | Refills: 0 | Status: SHIPPED | OUTPATIENT
Start: 2023-01-26 | End: 2023-03-01

## 2023-01-26 NOTE — TELEPHONE ENCOUNTER
4   Spoke with patient that states she is still very hoarse and nasally, states she just doesn't feel herself. No fever. Slight cough. Not taking anything over the counter. Wants to know if something can be sent to the pharmacy to get her feeling better. I let her know with mild symptoms we are generally recommend OTC meds to treat the symptoms you are having - patient wants to know which meds Karolina recommends specifically or have something sent.

## 2023-01-26 NOTE — TELEPHONE ENCOUNTER
----- Message from Fabienne Cochran sent at 1/26/2023  9:02 AM CST -----  Pt was dx with Covid on Monday and still feeling bad and would like to know if she can get a medication to help her get well. [phone]

## 2023-01-26 NOTE — TELEPHONE ENCOUNTER
Pt. Escalated due to responding to [facility] text message. Pt  Tested positive for COVID 1/23 reports that she lost sense of taste, and reports congestion. Advised will reach out to PCP, and should have call back by nurse within 1 hour. States will call her PCP. ED /UC advised if unable to speak with anyone in office.OCA offered as well  Reason for Disposition   [1] HIGH RISK patient AND [2] influenza is widespread in the community AND [3] ONE OR MORE respiratory symptoms: cough, sore throat, runny or stuffy nose    Additional Information   Negative: SEVERE difficulty breathing (e.g., struggling for each breath, speaks in single words)   Negative: Difficult to awaken or acting confused (e.g., disoriented, slurred speech)   Negative: Bluish (or gray) lips or face now   Negative: Shock suspected (e.g., cold/pale/clammy skin, too weak to stand, low BP, rapid pulse)   Negative: Sounds like a life-threatening emergency to the triager   Negative: SEVERE or constant chest pain or pressure  (Exception: Mild central chest pain, present only when coughing.)   Negative: MODERATE difficulty breathing (e.g., speaks in phrases, SOB even at rest, pulse 100-120)   Negative: Headache and stiff neck (can't touch chin to chest)   Negative: Oxygen level (e.g., pulse oximetry) 90 percent or lower   Negative: Chest pain or pressure  (Exception: MILD central chest pain, present only when coughing.)   Negative: Patient sounds very sick or weak to the triager   Negative: MILD difficulty breathing (e.g., minimal/no SOB at rest, SOB with walking, pulse <100)   Negative: Fever > 103 F (39.4 C)   Negative: [1] Fever > 101 F (38.3 C) AND [2] over 60 years of age   Negative: [1] Fever > 100.0 F (37.8 C) AND [2] bedridden (e.g., CVA, chronic illness, recovering from surgery)   Negative: [1] HIGH RISK for severe COVID complications (e.g., weak immune system, age > 64 years, obesity with BMI of 30 or higher, pregnant, chronic lung disease or other chronic  medical condition) AND [2] COVID symptoms (e.g., cough, fever)  (Exceptions: Already seen by PCP and no new or worsening symptoms.)    Protocols used: Coronavirus (COVID-19) Diagnosed or [id number]

## 2023-02-10 DIAGNOSIS — F41.9 ANXIETY: ICD-10-CM

## 2023-02-10 RX ORDER — ALPRAZOLAM 1 MG/1
1 TABLET ORAL NIGHTLY
Qty: 30 TABLET | Refills: 2 | Status: SHIPPED | OUTPATIENT
Start: 2023-02-10 | End: 2023-05-08 | Stop reason: SDUPTHER

## 2023-02-10 NOTE — TELEPHONE ENCOUNTER
----- Message from Kaley Deleon sent at 2/10/2023  8:59 AM CST -----  Patient called and stated that she need a refill of her alprazolam called into [facility] [address] in [place] if any questions please give her a call at [phone]

## 2023-02-17 ENCOUNTER — TELEPHONE (OUTPATIENT)
Dept: FAMILY MEDICINE | Facility: CLINIC | Age: 73
End: 2023-02-17

## 2023-02-17 DIAGNOSIS — Z79.899 ENCOUNTER FOR LONG-TERM (CURRENT) USE OF OTHER MEDICATIONS: ICD-10-CM

## 2023-02-17 DIAGNOSIS — E11.65 UNCONTROLLED TYPE 2 DIABETES MELLITUS WITH HYPERGLYCEMIA: Primary | ICD-10-CM

## 2023-02-17 DIAGNOSIS — E03.9 HYPOTHYROIDISM, UNSPECIFIED TYPE: ICD-10-CM

## 2023-02-17 DIAGNOSIS — I10 ESSENTIAL HYPERTENSION: ICD-10-CM

## 2023-02-17 DIAGNOSIS — E78.5 HYPERLIPIDEMIA, UNSPECIFIED HYPERLIPIDEMIA TYPE: ICD-10-CM

## 2023-03-01 ENCOUNTER — OFFICE VISIT (OUTPATIENT)
Dept: FAMILY MEDICINE | Facility: CLINIC | Age: 73
End: 2023-03-01
Payer: MEDICARE

## 2023-03-01 VITALS
DIASTOLIC BLOOD PRESSURE: 60 MMHG | SYSTOLIC BLOOD PRESSURE: 138 MMHG | HEIGHT: 62 IN | OXYGEN SATURATION: 96 % | WEIGHT: 135 LBS | HEART RATE: 86 BPM | BODY MASS INDEX: 24.84 KG/M2

## 2023-03-01 DIAGNOSIS — E11.65 UNCONTROLLED TYPE 2 DIABETES MELLITUS WITH HYPERGLYCEMIA: ICD-10-CM

## 2023-03-01 DIAGNOSIS — F51.01 PRIMARY INSOMNIA: ICD-10-CM

## 2023-03-01 DIAGNOSIS — Z63.79 STRESS DUE TO ILLNESS OF FAMILY MEMBER: ICD-10-CM

## 2023-03-01 DIAGNOSIS — Z79.899 HIGH RISK MEDICATION USE: ICD-10-CM

## 2023-03-01 DIAGNOSIS — E78.5 HYPERLIPIDEMIA, UNSPECIFIED HYPERLIPIDEMIA TYPE: ICD-10-CM

## 2023-03-01 DIAGNOSIS — K21.9 GASTROESOPHAGEAL REFLUX DISEASE, UNSPECIFIED WHETHER ESOPHAGITIS PRESENT: ICD-10-CM

## 2023-03-01 DIAGNOSIS — Z51.81 ENCOUNTER FOR THERAPEUTIC DRUG MONITORING: ICD-10-CM

## 2023-03-01 DIAGNOSIS — I10 HYPERTENSION, UNSPECIFIED TYPE: ICD-10-CM

## 2023-03-01 DIAGNOSIS — I10 ESSENTIAL HYPERTENSION: ICD-10-CM

## 2023-03-01 DIAGNOSIS — Z79.899 ENCOUNTER FOR LONG-TERM (CURRENT) USE OF OTHER MEDICATIONS: Primary | ICD-10-CM

## 2023-03-01 DIAGNOSIS — M15.9 OSTEOARTHRITIS OF MULTIPLE JOINTS, UNSPECIFIED OSTEOARTHRITIS TYPE: ICD-10-CM

## 2023-03-01 DIAGNOSIS — F41.9 ANXIETY: ICD-10-CM

## 2023-03-01 PROCEDURE — 1101F PR PT FALLS ASSESS DOC 0-1 FALLS W/OUT INJ PAST YR: ICD-10-PCS | Mod: CPTII,S$GLB,, | Performed by: NURSE PRACTITIONER

## 2023-03-01 PROCEDURE — 99214 OFFICE O/P EST MOD 30 MIN: CPT | Mod: S$GLB,,, | Performed by: NURSE PRACTITIONER

## 2023-03-01 PROCEDURE — 1160F PR REVIEW ALL MEDS BY PRESCRIBER/CLIN PHARMACIST DOCUMENTED: ICD-10-PCS | Mod: CPTII,S$GLB,, | Performed by: NURSE PRACTITIONER

## 2023-03-01 PROCEDURE — 3078F PR MOST RECENT DIASTOLIC BLOOD PRESSURE < 80 MM HG: ICD-10-PCS | Mod: CPTII,S$GLB,, | Performed by: NURSE PRACTITIONER

## 2023-03-01 PROCEDURE — 3075F PR MOST RECENT SYSTOLIC BLOOD PRESS GE 130-139MM HG: ICD-10-PCS | Mod: CPTII,S$GLB,, | Performed by: NURSE PRACTITIONER

## 2023-03-01 PROCEDURE — 1101F PT FALLS ASSESS-DOCD LE1/YR: CPT | Mod: CPTII,S$GLB,, | Performed by: NURSE PRACTITIONER

## 2023-03-01 PROCEDURE — 3008F PR BODY MASS INDEX (BMI) DOCUMENTED: ICD-10-PCS | Mod: CPTII,S$GLB,, | Performed by: NURSE PRACTITIONER

## 2023-03-01 PROCEDURE — 3075F SYST BP GE 130 - 139MM HG: CPT | Mod: CPTII,S$GLB,, | Performed by: NURSE PRACTITIONER

## 2023-03-01 PROCEDURE — 3044F HG A1C LEVEL LT 7.0%: CPT | Mod: CPTII,S$GLB,, | Performed by: NURSE PRACTITIONER

## 2023-03-01 PROCEDURE — 1160F RVW MEDS BY RX/DR IN RCRD: CPT | Mod: CPTII,S$GLB,, | Performed by: NURSE PRACTITIONER

## 2023-03-01 PROCEDURE — 1159F MED LIST DOCD IN RCRD: CPT | Mod: CPTII,S$GLB,, | Performed by: NURSE PRACTITIONER

## 2023-03-01 PROCEDURE — 3078F DIAST BP <80 MM HG: CPT | Mod: CPTII,S$GLB,, | Performed by: NURSE PRACTITIONER

## 2023-03-01 PROCEDURE — 3288F FALL RISK ASSESSMENT DOCD: CPT | Mod: CPTII,S$GLB,, | Performed by: NURSE PRACTITIONER

## 2023-03-01 PROCEDURE — 4010F PR ACE/ARB THEARPY RXD/TAKEN: ICD-10-PCS | Mod: CPTII,S$GLB,, | Performed by: NURSE PRACTITIONER

## 2023-03-01 PROCEDURE — 3288F PR FALLS RISK ASSESSMENT DOCUMENTED: ICD-10-PCS | Mod: CPTII,S$GLB,, | Performed by: NURSE PRACTITIONER

## 2023-03-01 PROCEDURE — 3044F PR MOST RECENT HEMOGLOBIN A1C LEVEL <7.0%: ICD-10-PCS | Mod: CPTII,S$GLB,, | Performed by: NURSE PRACTITIONER

## 2023-03-01 PROCEDURE — 3008F BODY MASS INDEX DOCD: CPT | Mod: CPTII,S$GLB,, | Performed by: NURSE PRACTITIONER

## 2023-03-01 PROCEDURE — 99214 PR OFFICE/OUTPT VISIT, EST, LEVL IV, 30-39 MIN: ICD-10-PCS | Mod: S$GLB,,, | Performed by: NURSE PRACTITIONER

## 2023-03-01 PROCEDURE — 1159F PR MEDICATION LIST DOCUMENTED IN MEDICAL RECORD: ICD-10-PCS | Mod: CPTII,S$GLB,, | Performed by: NURSE PRACTITIONER

## 2023-03-01 PROCEDURE — 4010F ACE/ARB THERAPY RXD/TAKEN: CPT | Mod: CPTII,S$GLB,, | Performed by: NURSE PRACTITIONER

## 2023-03-01 RX ORDER — LOSARTAN POTASSIUM 100 MG/1
100 TABLET ORAL DAILY
Qty: 90 TABLET | Refills: 0 | Status: SHIPPED | OUTPATIENT
Start: 2023-05-02 | End: 2023-06-27 | Stop reason: SDUPTHER

## 2023-03-01 RX ORDER — HYDRALAZINE HYDROCHLORIDE 25 MG/1
25 TABLET, FILM COATED ORAL 2 TIMES DAILY
Qty: 60 TABLET | Refills: 5 | Status: SHIPPED | OUTPATIENT
Start: 2023-03-01 | End: 2023-06-27 | Stop reason: SDUPTHER

## 2023-03-01 RX ORDER — ATORVASTATIN CALCIUM 40 MG/1
60 TABLET, FILM COATED ORAL DAILY
Qty: 135 TABLET | Refills: 3 | Status: SHIPPED | OUTPATIENT
Start: 2023-05-02 | End: 2023-06-27 | Stop reason: SDUPTHER

## 2023-03-01 RX ORDER — HYDROCODONE BITARTRATE AND ACETAMINOPHEN 10; 325 MG/1; MG/1
1 TABLET ORAL EVERY 6 HOURS PRN
Qty: 30 TABLET | Refills: 0 | Status: SHIPPED | OUTPATIENT
Start: 2023-03-01 | End: 2023-03-06

## 2023-03-01 NOTE — PROGRESS NOTES
SUBJECTIVE:    Patient ID: Kay Valencia is a 72 y.o. female.    Chief Complaint: Follow-up (Bottles brought/ no complaints/ med refills/last took xanax  last night/BG)    72-year-old female presents for check up. She is treated for diabetes, htn, oa, anxiety, insomnia, she is taking medications as prescribed. bp in office is well controlled. Does not check bp at home. a1c is 6.4mg/dl. this has improved since previous.  Under some stressors. Her father recently had stroke. She is primary caregiver. Sleeping ok off and on. Had labs would like to discuss results      Telephone on 02/17/2023   Component Date Value Ref Range Status    WBC 03/01/2023 3.6 (L)  3.8 - 10.8 Thousand/uL Final    RBC 03/01/2023 3.72 (L)  3.80 - 5.10 Million/uL Final    Hemoglobin 03/01/2023 11.2 (L)  11.7 - 15.5 g/dL Final    Hematocrit 03/01/2023 33.7 (L)  35.0 - 45.0 % Final    MCV 03/01/2023 90.6  80.0 - 100.0 fL Final    MCH 03/01/2023 30.1  27.0 - 33.0 pg Final    MCHC 03/01/2023 33.2  32.0 - 36.0 g/dL Final    RDW 03/01/2023 13.2  11.0 - 15.0 % Final    Platelets 03/01/2023 135 (L)  140 - 400 Thousand/uL Final    MPV 03/01/2023 11.3  7.5 - 12.5 fL Final    Neutrophils, Abs 03/01/2023 2,228  1,500 - 7,800 cells/uL Final    Lymph # 03/01/2023 742 (L)  850 - 3,900 cells/uL Final    Mono # 03/01/2023 349  200 - 950 cells/uL Final    Eos # 03/01/2023 230  15 - 500 cells/uL Final    Baso # 03/01/2023 50  0 - 200 cells/uL Final    Neutrophils Relative 03/01/2023 61.9  % Final    Lymph % 03/01/2023 20.6  % Final    Mono % 03/01/2023 9.7  % Final    Eosinophil % 03/01/2023 6.4  % Final    Basophil % 03/01/2023 1.4  % Final    Glucose 03/01/2023 109 (H)  65 - 99 mg/dL Final    BUN 03/01/2023 25  7 - 25 mg/dL Final    Creatinine 03/01/2023 0.98  0.60 - 1.00 mg/dL Final    eGFR 03/01/2023 62  > OR = 60 mL/min/1.73m2 Final    BUN/Creatinine Ratio 03/01/2023 NOT APPLICABLE  6 - 22 (calc) Final    Sodium 03/01/2023 139  135 - 146 mmol/L Final     Potassium 03/01/2023 4.3  3.5 - 5.3 mmol/L Final    Chloride 03/01/2023 102  98 - 110 mmol/L Final    CO2 03/01/2023 30  20 - 32 mmol/L Final    Calcium 03/01/2023 10.1  8.6 - 10.4 mg/dL Final    Total Protein 03/01/2023 6.6  6.1 - 8.1 g/dL Final    Albumin 03/01/2023 4.5  3.6 - 5.1 g/dL Final    Globulin, Total 03/01/2023 2.1  1.9 - 3.7 g/dL (calc) Final    Albumin/Globulin Ratio 03/01/2023 2.1  1.0 - 2.5 (calc) Final    Total Bilirubin 03/01/2023 0.7  0.2 - 1.2 mg/dL Final    Alkaline Phosphatase 03/01/2023 67  37 - 153 U/L Final    AST 03/01/2023 40 (H)  10 - 35 U/L Final    ALT 03/01/2023 66 (H)  6 - 29 U/L Final    Hemoglobin A1C 03/01/2023 6.4 (H)  <5.7 % of total Hgb Final    Cholesterol 03/01/2023 176  <200 mg/dL Final    HDL 03/01/2023 51  > OR = 50 mg/dL Final    Triglycerides 03/01/2023 183 (H)  <150 mg/dL Final    LDL Cholesterol 03/01/2023 97  mg/dL (calc) Final    HDL/Cholesterol Ratio 03/01/2023 3.5  <5.0 (calc) Final    Non HDL Chol. (LDL+VLDL) 03/01/2023 125  <130 mg/dL (calc) Final    TSH w/reflex to FT4 03/01/2023 0.53  0.40 - 4.50 mIU/L Final   Office Visit on 01/23/2023   Component Date Value Ref Range Status    POC Rapid COVID 01/23/2023 Positive (A)  Negative Final     Acceptable 01/23/2023 Yes   Final    Rapid Influenza A Ag 01/23/2023 Negative  Negative Final    Rapid Influenza B Ag 01/23/2023 Negative  Negative Final     Acceptable 01/23/2023 Yes   Final    Rapid Strep A Screen 01/23/2023 Negative  Negative Final     Acceptable 01/23/2023 Yes   Final   Office Visit on 11/14/2022   Component Date Value Ref Range Status    Hemoglobin A1C, POC 11/14/2022 7.4  % Final    Glucose 11/14/2022 158 (H)  65 - 99 mg/dL Final    BUN 11/14/2022 30 (H)  7 - 25 mg/dL Final    Creatinine 11/14/2022 1.09 (H)  0.60 - 1.00 mg/dL Final    eGFR 11/14/2022 54 (L)  > OR = 60 mL/min/1.73m2 Final    BUN/Creatinine Ratio 11/14/2022 28 (H)  6 - 22 (calc) Final    Sodium  11/14/2022 138  135 - 146 mmol/L Final    Potassium 11/14/2022 4.4  3.5 - 5.3 mmol/L Final    Chloride 11/14/2022 99  98 - 110 mmol/L Final    CO2 11/14/2022 28  20 - 32 mmol/L Final    Calcium 11/14/2022 10.1  8.6 - 10.4 mg/dL Final    Total Protein 11/14/2022 6.9  6.1 - 8.1 g/dL Final    Albumin 11/14/2022 4.9  3.6 - 5.1 g/dL Final    Globulin, Total 11/14/2022 2.0  1.9 - 3.7 g/dL (calc) Final    Albumin/Globulin Ratio 11/14/2022 2.5  1.0 - 2.5 (calc) Final    Total Bilirubin 11/14/2022 0.9  0.2 - 1.2 mg/dL Final    Alkaline Phosphatase 11/14/2022 71  37 - 153 U/L Final    AST 11/14/2022 34  10 - 35 U/L Final    ALT 11/14/2022 63 (H)  6 - 29 U/L Final    Cholesterol 11/14/2022 206 (H)  <200 mg/dL Final    HDL 11/14/2022 50  > OR = 50 mg/dL Final    Triglycerides 11/14/2022 315 (H)  <150 mg/dL Final    LDL Cholesterol 11/14/2022 114 (H)  mg/dL (calc) Final    HDL/Cholesterol Ratio 11/14/2022 4.1  <5.0 (calc) Final    Non HDL Chol. (LDL+VLDL) 11/14/2022 156 (H)  <130 mg/dL (calc) Final       Past Medical History:   Diagnosis Date    Anxiety     Depression     Diabetes mellitus, type 2     GERD (gastroesophageal reflux disease)     Hyperlipidemia     Hypertension      Social History     Socioeconomic History    Marital status:    Tobacco Use    Smoking status: Former    Smokeless tobacco: Never   Substance and Sexual Activity    Alcohol use: No    Drug use: No    Sexual activity: Yes     Past Surgical History:   Procedure Laterality Date    BREAST BIOPSY Left 1990'S    BENIGN    HYSTERECTOMY      INCONTINENCE SURGERY       Family History   Problem Relation Age of Onset    Diabetes Maternal Grandmother     Hypertension Maternal Grandmother     Diabetes Father     Breast cancer Mother 57    Hypertension Mother     Diabetes Sister     Ovarian cancer Neg Hx        Review of patient's allergies indicates:   Allergen Reactions    Flexeril [cyclobenzaprine] Other (See Comments)     Unknown was a long time ago.     Keflex [cephalexin] Diarrhea    Sulfa (sulfonamide antibiotics) Nausea Only    Augmentin [amoxicillin-pot clavulanate] Other (See Comments)     Gas       Current Outpatient Medications:     ALPRAZolam (XANAX) 1 MG tablet, Take 1 tablet (1 mg total) by mouth every evening., Disp: 30 tablet, Rfl: 2    blood sugar diagnostic Strp, To check BG 1 times daily, to use with insurance preferred meter, Disp: 200 strip, Rfl: 1    blood-glucose meter kit, To check BG 1 times daily, to use with insurance preferred meter, Disp: 1 each, Rfl: 0    calcium carbonate/vitamin D3 (CALTRATE 600 PLUS D ORAL), Caltrate 600 plus D, Disp: , Rfl:     famotidine (PEPCID) 40 MG tablet, Take 1 tablet (40 mg total) by mouth once daily., Disp: 30 tablet, Rfl: 11    hydroCHLOROthiazide (HYDRODIURIL) 12.5 MG Tab, Take 2 tablets (25 mg total) by mouth once daily., Disp: 180 tablet, Rfl: 1    HYDROcodone-acetaminophen (NORCO)  mg per tablet, Take 1 tablet by mouth every 8 (eight) hours as needed for Pain., Disp: 30 tablet, Rfl: 0    Lactobacillus rhamnosus GG (CULTURELLE) 10 billion cell capsule, Take 1 capsule by mouth once daily., Disp: , Rfl:     lancets 33 gauge Misc, 1 lancet by Misc.(Non-Drug; Combo Route) route 3 (three) times a week., Disp: 100 each, Rfl: 3    lancets Misc, To check BG 1 times daily, to use with insurance preferred meter, Disp: 200 each, Rfl: 1    melatonin 5 mg Cap, Take by mouth., Disp: , Rfl:     meloxicam (MOBIC) 15 MG tablet, Take 1 tablet (15 mg total) by mouth once daily., Disp: 90 tablet, Rfl: 1    metFORMIN (GLUCOPHAGE) 500 MG tablet, Take 1 tablet (500 mg total) by mouth 2 (two) times daily., Disp: 180 tablet, Rfl: 1    omega 3-dha-epa-fish oil 100-160-1,000 mg Cap, Fish Oil, Disp: , Rfl:     pantoprazole (PROTONIX) 40 MG tablet, Take 1 tablet (40 mg total) by mouth once daily., Disp: 30 tablet, Rfl: 11    potassium chloride (MICRO-K) 10 MEQ CpSR, Take 1 capsule (10 mEq total) by mouth once daily., Disp: 90  capsule, Rfl: 1    [START ON 5/2/2023] atorvastatin (LIPITOR) 40 MG tablet, Take 1.5 tablets (60 mg total) by mouth once daily., Disp: 135 tablet, Rfl: 3    estradioL (ESTRACE) 0.01 % (0.1 mg/gram) vaginal cream, Place 1 g vaginally every Mon, Wed, Fri., Disp: 12 g, Rfl: 11    hydrALAZINE (APRESOLINE) 25 MG tablet, Take 1 tablet (25 mg total) by mouth 2 (two) times a day., Disp: 60 tablet, Rfl: 5    HYDROcodone-acetaminophen (NORCO)  mg per tablet, Take 1 tablet by mouth every 6 (six) hours as needed for Pain., Disp: 30 tablet, Rfl: 0    hydrocortisone 1 % ointment, Apply topically 2 (two) times daily. (Patient not taking: Reported on 3/1/2023), Disp: 57 g, Rfl: 0    [START ON 5/2/2023] losartan (COZAAR) 100 MG tablet, Take 1 tablet (100 mg total) by mouth once daily., Disp: 90 tablet, Rfl: 0    olmesartan-hydrochlorothiazide (BENICAR HCT) 40-12.5 mg Tab, Take 1 tablet by mouth once daily. (Patient not taking: Reported on 3/1/2023), Disp: 90 tablet, Rfl: 3    traZODone (DESYREL) 50 MG tablet, Take 1 tablet (50 mg total) by mouth every evening., Disp: 30 tablet, Rfl: 1    Review of Systems   Constitutional:  Negative for chills, fever and unexpected weight change.   HENT:  Negative for ear pain, rhinorrhea and sore throat.    Eyes:  Negative for pain and visual disturbance.   Respiratory:  Negative for cough and shortness of breath.    Cardiovascular:  Negative for chest pain, palpitations and leg swelling.   Gastrointestinal:  Negative for abdominal pain, diarrhea, nausea and vomiting.   Genitourinary:  Negative for difficulty urinating, hematuria and vaginal bleeding.   Musculoskeletal:  Negative for arthralgias.   Skin:  Negative for rash.   Neurological:  Negative for dizziness, weakness and headaches.   Psychiatric/Behavioral:  Negative for agitation and sleep disturbance. The patient is not nervous/anxious.         Objective:      Vitals:    03/01/23 0951   BP: 138/60   Pulse: 86   SpO2: 96%   Weight:  "61.2 kg (135 lb)   Height: 5' 2" (1.575 m)     Physical Exam  Vitals and nursing note reviewed.   Constitutional:       General: She is not in acute distress.     Appearance: Normal appearance. She is well-developed.   HENT:      Head: Normocephalic and atraumatic.      Right Ear: External ear normal.      Left Ear: External ear normal.   Eyes:      Pupils: Pupils are equal, round, and reactive to light.   Cardiovascular:      Rate and Rhythm: Normal rate and regular rhythm.      Pulses:           Dorsalis pedis pulses are 2+ on the right side.        Posterior tibial pulses are 2+ on the right side.      Heart sounds: Normal heart sounds.   Pulmonary:      Effort: Pulmonary effort is normal.      Breath sounds: Normal breath sounds.   Abdominal:      General: Bowel sounds are normal.      Palpations: Abdomen is soft.   Musculoskeletal:         General: No deformity. Normal range of motion.      Cervical back: Normal range of motion and neck supple.      Right foot: Normal range of motion. No deformity.   Feet:      Right foot:      Protective Sensation: 5 sites tested.  5 sites sensed.      Left foot:      Protective Sensation: 5 sites tested.  5 sites sensed.      Skin integrity: No skin breakdown.   Lymphadenopathy:      Cervical: No cervical adenopathy.   Skin:     General: Skin is warm and dry.   Neurological:      Mental Status: She is alert and oriented to person, place, and time.   Psychiatric:         Behavior: Behavior normal.         Assessment:       1. Encounter for long-term (current) use of other medications    2. Essential hypertension    3. Uncontrolled type 2 diabetes mellitus with hyperglycemia    4. Hyperlipidemia, unspecified hyperlipidemia type    5. Encounter for therapeutic drug monitoring    6. Anxiety    7. Primary insomnia    8. Hypertension, unspecified type    9. High risk medication use    10. Osteoarthritis of multiple joints, unspecified osteoarthritis type    11. Gastroesophageal " reflux disease, unspecified whether esophagitis present    12. Stress due to illness of family member           Plan:       Encounter for long-term (current) use of other medications  -     DRUG MONITOR, PANEL 4, W/CONF, URINE; Future; Expected date: 03/01/2023    Essential hypertension  -     hydrALAZINE (APRESOLINE) 25 MG tablet; Take 1 tablet (25 mg total) by mouth 2 (two) times a day.  Dispense: 60 tablet; Refill: 5  -     losartan (COZAAR) 100 MG tablet; Take 1 tablet (100 mg total) by mouth once daily.  Dispense: 90 tablet; Refill: 0    Uncontrolled type 2 diabetes mellitus with hyperglycemia  -     atorvastatin (LIPITOR) 40 MG tablet; Take 1.5 tablets (60 mg total) by mouth once daily.  Dispense: 135 tablet; Refill: 3    Hyperlipidemia, unspecified hyperlipidemia type  -     atorvastatin (LIPITOR) 40 MG tablet; Take 1.5 tablets (60 mg total) by mouth once daily.  Dispense: 135 tablet; Refill: 3    Encounter for therapeutic drug monitoring  -     DRUG MONITOR, PANEL 4, W/CONF, URINE; Future; Expected date: 03/01/2023    Anxiety  -     DRUG MONITOR, PANEL 4, W/CONF, URINE; Future; Expected date: 03/01/2023    Primary insomnia    Hypertension, unspecified type    High risk medication use    Osteoarthritis of multiple joints, unspecified osteoarthritis type    Gastroesophageal reflux disease, unspecified whether esophagitis present    Stress due to illness of family member      Follow up in about 3 months (around 6/1/2023), or if symptoms worsen or fail to improve, for medication management.        3/6/2023 Karolina Ramirez

## 2023-03-02 LAB
ALBUMIN SERPL-MCNC: 4.5 G/DL (ref 3.6–5.1)
ALBUMIN/GLOB SERPL: 2.1 (CALC) (ref 1–2.5)
ALP SERPL-CCNC: 67 U/L (ref 37–153)
ALT SERPL-CCNC: 66 U/L (ref 6–29)
AST SERPL-CCNC: 40 U/L (ref 10–35)
BASOPHILS # BLD AUTO: 50 CELLS/UL (ref 0–200)
BASOPHILS NFR BLD AUTO: 1.4 %
BILIRUB SERPL-MCNC: 0.7 MG/DL (ref 0.2–1.2)
BUN SERPL-MCNC: 25 MG/DL (ref 7–25)
BUN/CREAT SERPL: ABNORMAL (CALC) (ref 6–22)
CALCIUM SERPL-MCNC: 10.1 MG/DL (ref 8.6–10.4)
CHLORIDE SERPL-SCNC: 102 MMOL/L (ref 98–110)
CHOLEST SERPL-MCNC: 176 MG/DL
CHOLEST/HDLC SERPL: 3.5 (CALC)
CO2 SERPL-SCNC: 30 MMOL/L (ref 20–32)
CREAT SERPL-MCNC: 0.98 MG/DL (ref 0.6–1)
EGFR: 62 ML/MIN/1.73M2
EOSINOPHIL # BLD AUTO: 230 CELLS/UL (ref 15–500)
EOSINOPHIL NFR BLD AUTO: 6.4 %
ERYTHROCYTE [DISTWIDTH] IN BLOOD BY AUTOMATED COUNT: 13.2 % (ref 11–15)
GLOBULIN SER CALC-MCNC: 2.1 G/DL (CALC) (ref 1.9–3.7)
GLUCOSE SERPL-MCNC: 109 MG/DL (ref 65–99)
HBA1C MFR BLD: 6.4 % OF TOTAL HGB
HCT VFR BLD AUTO: 33.7 % (ref 35–45)
HDLC SERPL-MCNC: 51 MG/DL
HGB BLD-MCNC: 11.2 G/DL (ref 11.7–15.5)
LDLC SERPL CALC-MCNC: 97 MG/DL (CALC)
LYMPHOCYTES # BLD AUTO: 742 CELLS/UL (ref 850–3900)
LYMPHOCYTES NFR BLD AUTO: 20.6 %
MCH RBC QN AUTO: 30.1 PG (ref 27–33)
MCHC RBC AUTO-ENTMCNC: 33.2 G/DL (ref 32–36)
MCV RBC AUTO: 90.6 FL (ref 80–100)
MONOCYTES # BLD AUTO: 349 CELLS/UL (ref 200–950)
MONOCYTES NFR BLD AUTO: 9.7 %
NEUTROPHILS # BLD AUTO: 2228 CELLS/UL (ref 1500–7800)
NEUTROPHILS NFR BLD AUTO: 61.9 %
NONHDLC SERPL-MCNC: 125 MG/DL (CALC)
PLATELET # BLD AUTO: 135 THOUSAND/UL (ref 140–400)
PMV BLD REES-ECKER: 11.3 FL (ref 7.5–12.5)
POTASSIUM SERPL-SCNC: 4.3 MMOL/L (ref 3.5–5.3)
PROT SERPL-MCNC: 6.6 G/DL (ref 6.1–8.1)
RBC # BLD AUTO: 3.72 MILLION/UL (ref 3.8–5.1)
SODIUM SERPL-SCNC: 139 MMOL/L (ref 135–146)
TRIGL SERPL-MCNC: 183 MG/DL
TSH SERPL-ACNC: 0.53 MIU/L (ref 0.4–4.5)
WBC # BLD AUTO: 3.6 THOUSAND/UL (ref 3.8–10.8)

## 2023-03-06 ENCOUNTER — TELEPHONE (OUTPATIENT)
Dept: FAMILY MEDICINE | Facility: CLINIC | Age: 73
End: 2023-03-06

## 2023-03-06 RX ORDER — HYDROCODONE BITARTRATE AND ACETAMINOPHEN 10; 325 MG/1; MG/1
1 TABLET ORAL EVERY 6 HOURS PRN
Qty: 30 TABLET | Refills: 0 | Status: SHIPPED | OUTPATIENT
Start: 2023-03-06 | End: 2023-04-05

## 2023-03-06 NOTE — TELEPHONE ENCOUNTER
----- Message from Karolina Ramirez NP sent at 3/6/2023  2:45 AM CST -----  Cholesterol has improved. Liver enzyme is slightly elevated. We will monitor for now. Mild anemia. We will repeat at next visit. Rest of labs are normal.

## 2023-03-06 NOTE — TELEPHONE ENCOUNTER
----- Message from Fabienne Cochran sent at 3/6/2023 10:23 AM CST -----  Pt JANE was sent to mail order pharmacy. She would like to have norco sent to [id number] - [place], [place] [facility] [address] [facility] [place] [address]  [phone]

## 2023-05-08 DIAGNOSIS — F41.9 ANXIETY: ICD-10-CM

## 2023-05-08 RX ORDER — ALPRAZOLAM 1 MG/1
1 TABLET ORAL NIGHTLY
Qty: 30 TABLET | Refills: 2 | Status: SHIPPED | OUTPATIENT
Start: 2023-05-08 | End: 2023-06-12 | Stop reason: SDUPTHER

## 2023-05-08 NOTE — TELEPHONE ENCOUNTER
----- Message from Azul Vivas sent at 5/8/2023 10:08 AM CDT -----  Pt needs refill on xanax   [facility]  [phone]

## 2023-06-12 DIAGNOSIS — F41.9 ANXIETY: ICD-10-CM

## 2023-06-12 RX ORDER — ALPRAZOLAM 1 MG/1
1 TABLET ORAL NIGHTLY
Qty: 30 TABLET | Refills: 2 | Status: SHIPPED | OUTPATIENT
Start: 2023-06-12 | End: 2023-09-06 | Stop reason: SDUPTHER

## 2023-06-12 NOTE — TELEPHONE ENCOUNTER
----- Message from Fabienne Lake MA sent at 6/12/2023  8:35 AM CDT -----  Regarding: pharm out of med  [facility] [address] [place] does not have her xanax. Please send new rx to [facility] [address] [phone]  Patient phone: [phone]

## 2023-06-27 ENCOUNTER — OFFICE VISIT (OUTPATIENT)
Dept: FAMILY MEDICINE | Facility: CLINIC | Age: 73
End: 2023-06-27
Payer: MEDICARE

## 2023-06-27 VITALS
HEART RATE: 75 BPM | OXYGEN SATURATION: 98 % | DIASTOLIC BLOOD PRESSURE: 60 MMHG | BODY MASS INDEX: 25.21 KG/M2 | SYSTOLIC BLOOD PRESSURE: 110 MMHG | WEIGHT: 137 LBS | HEIGHT: 62 IN

## 2023-06-27 DIAGNOSIS — E78.5 HYPERLIPIDEMIA, UNSPECIFIED HYPERLIPIDEMIA TYPE: ICD-10-CM

## 2023-06-27 DIAGNOSIS — E11.65 UNCONTROLLED TYPE 2 DIABETES MELLITUS WITH HYPERGLYCEMIA: ICD-10-CM

## 2023-06-27 DIAGNOSIS — K21.9 GASTROESOPHAGEAL REFLUX DISEASE, UNSPECIFIED WHETHER ESOPHAGITIS PRESENT: ICD-10-CM

## 2023-06-27 DIAGNOSIS — F51.01 PRIMARY INSOMNIA: ICD-10-CM

## 2023-06-27 DIAGNOSIS — I10 ESSENTIAL HYPERTENSION: ICD-10-CM

## 2023-06-27 DIAGNOSIS — M15.9 OSTEOARTHRITIS OF MULTIPLE JOINTS, UNSPECIFIED OSTEOARTHRITIS TYPE: ICD-10-CM

## 2023-06-27 DIAGNOSIS — I10 HYPERTENSION, UNSPECIFIED TYPE: ICD-10-CM

## 2023-06-27 DIAGNOSIS — F41.9 ANXIETY: Primary | ICD-10-CM

## 2023-06-27 LAB — HBA1C MFR BLD: 6.1 %

## 2023-06-27 PROCEDURE — 4010F PR ACE/ARB THEARPY RXD/TAKEN: ICD-10-PCS | Mod: CPTII,S$GLB,, | Performed by: NURSE PRACTITIONER

## 2023-06-27 PROCEDURE — 83036 POCT HEMOGLOBIN A1C: ICD-10-PCS | Mod: QW,,, | Performed by: NURSE PRACTITIONER

## 2023-06-27 PROCEDURE — 1159F MED LIST DOCD IN RCRD: CPT | Mod: CPTII,S$GLB,, | Performed by: NURSE PRACTITIONER

## 2023-06-27 PROCEDURE — 3044F HG A1C LEVEL LT 7.0%: CPT | Mod: CPTII,S$GLB,, | Performed by: NURSE PRACTITIONER

## 2023-06-27 PROCEDURE — 3074F PR MOST RECENT SYSTOLIC BLOOD PRESSURE < 130 MM HG: ICD-10-PCS | Mod: CPTII,S$GLB,, | Performed by: NURSE PRACTITIONER

## 2023-06-27 PROCEDURE — 3074F SYST BP LT 130 MM HG: CPT | Mod: CPTII,S$GLB,, | Performed by: NURSE PRACTITIONER

## 2023-06-27 PROCEDURE — 1160F RVW MEDS BY RX/DR IN RCRD: CPT | Mod: CPTII,S$GLB,, | Performed by: NURSE PRACTITIONER

## 2023-06-27 PROCEDURE — 3008F BODY MASS INDEX DOCD: CPT | Mod: CPTII,S$GLB,, | Performed by: NURSE PRACTITIONER

## 2023-06-27 PROCEDURE — 1159F PR MEDICATION LIST DOCUMENTED IN MEDICAL RECORD: ICD-10-PCS | Mod: CPTII,S$GLB,, | Performed by: NURSE PRACTITIONER

## 2023-06-27 PROCEDURE — 99214 PR OFFICE/OUTPT VISIT, EST, LEVL IV, 30-39 MIN: ICD-10-PCS | Mod: S$GLB,,, | Performed by: NURSE PRACTITIONER

## 2023-06-27 PROCEDURE — 1160F PR REVIEW ALL MEDS BY PRESCRIBER/CLIN PHARMACIST DOCUMENTED: ICD-10-PCS | Mod: CPTII,S$GLB,, | Performed by: NURSE PRACTITIONER

## 2023-06-27 PROCEDURE — 83036 HEMOGLOBIN GLYCOSYLATED A1C: CPT | Mod: QW,,, | Performed by: NURSE PRACTITIONER

## 2023-06-27 PROCEDURE — 3078F PR MOST RECENT DIASTOLIC BLOOD PRESSURE < 80 MM HG: ICD-10-PCS | Mod: CPTII,S$GLB,, | Performed by: NURSE PRACTITIONER

## 2023-06-27 PROCEDURE — 99214 OFFICE O/P EST MOD 30 MIN: CPT | Mod: S$GLB,,, | Performed by: NURSE PRACTITIONER

## 2023-06-27 PROCEDURE — 3044F PR MOST RECENT HEMOGLOBIN A1C LEVEL <7.0%: ICD-10-PCS | Mod: CPTII,S$GLB,, | Performed by: NURSE PRACTITIONER

## 2023-06-27 PROCEDURE — 3078F DIAST BP <80 MM HG: CPT | Mod: CPTII,S$GLB,, | Performed by: NURSE PRACTITIONER

## 2023-06-27 PROCEDURE — 4010F ACE/ARB THERAPY RXD/TAKEN: CPT | Mod: CPTII,S$GLB,, | Performed by: NURSE PRACTITIONER

## 2023-06-27 PROCEDURE — 3008F PR BODY MASS INDEX (BMI) DOCUMENTED: ICD-10-PCS | Mod: CPTII,S$GLB,, | Performed by: NURSE PRACTITIONER

## 2023-06-27 RX ORDER — ATORVASTATIN CALCIUM 40 MG/1
60 TABLET, FILM COATED ORAL DAILY
Qty: 135 TABLET | Refills: 3 | Status: SHIPPED | OUTPATIENT
Start: 2023-06-27

## 2023-06-27 RX ORDER — MELOXICAM 15 MG/1
15 TABLET ORAL DAILY
Qty: 90 TABLET | Refills: 1 | Status: SHIPPED | OUTPATIENT
Start: 2023-06-27 | End: 2024-02-21 | Stop reason: SDUPTHER

## 2023-06-27 RX ORDER — POTASSIUM CHLORIDE 750 MG/1
10 CAPSULE, EXTENDED RELEASE ORAL DAILY
Qty: 90 CAPSULE | Refills: 1 | Status: SHIPPED | OUTPATIENT
Start: 2023-06-27 | End: 2023-10-04 | Stop reason: SDUPTHER

## 2023-06-27 RX ORDER — HYDRALAZINE HYDROCHLORIDE 25 MG/1
25 TABLET, FILM COATED ORAL 2 TIMES DAILY
Qty: 60 TABLET | Refills: 5 | Status: SHIPPED | OUTPATIENT
Start: 2023-06-27 | End: 2023-10-04 | Stop reason: SDUPTHER

## 2023-06-27 RX ORDER — HYDROCHLOROTHIAZIDE 12.5 MG/1
25 TABLET ORAL DAILY
Qty: 180 TABLET | Refills: 1 | Status: SHIPPED | OUTPATIENT
Start: 2023-06-27 | End: 2023-10-04 | Stop reason: SDUPTHER

## 2023-06-27 RX ORDER — METFORMIN HYDROCHLORIDE 500 MG/1
500 TABLET ORAL 2 TIMES DAILY
Qty: 180 TABLET | Refills: 1 | Status: SHIPPED | OUTPATIENT
Start: 2023-06-27

## 2023-06-27 RX ORDER — LOSARTAN POTASSIUM 100 MG/1
100 TABLET ORAL DAILY
Qty: 90 TABLET | Refills: 0 | Status: SHIPPED | OUTPATIENT
Start: 2023-06-27 | End: 2023-10-04 | Stop reason: SDUPTHER

## 2023-06-28 NOTE — PROGRESS NOTES
SUBJECTIVE:    Patient ID: Kay Valencia is a 72 y.o. female.    Chief Complaint: Follow-up (Bottles brought/3 month f/u/med refill/bg)    72-year-old female presents for check up. She is treated for diabetes, htn, oa, anxiety, insomnia, she is taking medications as prescribed. bp in office is well controlled. Does not check bp at home. a1c is 6.1mg/dl. this has improved since previous.  Under some stressors. Her father passed away. In the process and settling his estate. Sleeping ok. Not eating as much. Last labs in march      Office Visit on 06/27/2023   Component Date Value Ref Range Status    Hemoglobin A1C, POC 06/27/2023 6.1  % Final   Telephone on 02/17/2023   Component Date Value Ref Range Status    WBC 03/01/2023 3.6 (L)  3.8 - 10.8 Thousand/uL Final    RBC 03/01/2023 3.72 (L)  3.80 - 5.10 Million/uL Final    Hemoglobin 03/01/2023 11.2 (L)  11.7 - 15.5 g/dL Final    Hematocrit 03/01/2023 33.7 (L)  35.0 - 45.0 % Final    MCV 03/01/2023 90.6  80.0 - 100.0 fL Final    MCH 03/01/2023 30.1  27.0 - 33.0 pg Final    MCHC 03/01/2023 33.2  32.0 - 36.0 g/dL Final    RDW 03/01/2023 13.2  11.0 - 15.0 % Final    Platelets 03/01/2023 135 (L)  140 - 400 Thousand/uL Final    MPV 03/01/2023 11.3  7.5 - 12.5 fL Final    Neutrophils, Abs 03/01/2023 2,228  1,500 - 7,800 cells/uL Final    Lymph # 03/01/2023 742 (L)  850 - 3,900 cells/uL Final    Mono # 03/01/2023 349  200 - 950 cells/uL Final    Eos # 03/01/2023 230  15 - 500 cells/uL Final    Baso # 03/01/2023 50  0 - 200 cells/uL Final    Neutrophils Relative 03/01/2023 61.9  % Final    Lymph % 03/01/2023 20.6  % Final    Mono % 03/01/2023 9.7  % Final    Eosinophil % 03/01/2023 6.4  % Final    Basophil % 03/01/2023 1.4  % Final    Glucose 03/01/2023 109 (H)  65 - 99 mg/dL Final    BUN 03/01/2023 25  7 - 25 mg/dL Final    Creatinine 03/01/2023 0.98  0.60 - 1.00 mg/dL Final    eGFR 03/01/2023 62  > OR = 60 mL/min/1.73m2 Final    BUN/Creatinine Ratio 03/01/2023 NOT  APPLICABLE  6 - 22 (calc) Final    Sodium 03/01/2023 139  135 - 146 mmol/L Final    Potassium 03/01/2023 4.3  3.5 - 5.3 mmol/L Final    Chloride 03/01/2023 102  98 - 110 mmol/L Final    CO2 03/01/2023 30  20 - 32 mmol/L Final    Calcium 03/01/2023 10.1  8.6 - 10.4 mg/dL Final    Total Protein 03/01/2023 6.6  6.1 - 8.1 g/dL Final    Albumin 03/01/2023 4.5  3.6 - 5.1 g/dL Final    Globulin, Total 03/01/2023 2.1  1.9 - 3.7 g/dL (calc) Final    Albumin/Globulin Ratio 03/01/2023 2.1  1.0 - 2.5 (calc) Final    Total Bilirubin 03/01/2023 0.7  0.2 - 1.2 mg/dL Final    Alkaline Phosphatase 03/01/2023 67  37 - 153 U/L Final    AST 03/01/2023 40 (H)  10 - 35 U/L Final    ALT 03/01/2023 66 (H)  6 - 29 U/L Final    Hemoglobin A1C 03/01/2023 6.4 (H)  <5.7 % of total Hgb Final    Cholesterol 03/01/2023 176  <200 mg/dL Final    HDL 03/01/2023 51  > OR = 50 mg/dL Final    Triglycerides 03/01/2023 183 (H)  <150 mg/dL Final    LDL Cholesterol 03/01/2023 97  mg/dL (calc) Final    HDL/Cholesterol Ratio 03/01/2023 3.5  <5.0 (calc) Final    Non HDL Chol. (LDL+VLDL) 03/01/2023 125  <130 mg/dL (calc) Final    TSH w/reflex to FT4 03/01/2023 0.53  0.40 - 4.50 mIU/L Final   Office Visit on 01/23/2023   Component Date Value Ref Range Status    POC Rapid COVID 01/23/2023 Positive (A)  Negative Final     Acceptable 01/23/2023 Yes   Final    Rapid Influenza A Ag 01/23/2023 Negative  Negative Final    Rapid Influenza B Ag 01/23/2023 Negative  Negative Final     Acceptable 01/23/2023 Yes   Final    Rapid Strep A Screen 01/23/2023 Negative  Negative Final     Acceptable 01/23/2023 Yes   Final       Past Medical History:   Diagnosis Date    Anxiety     Depression     Diabetes mellitus, type 2     GERD (gastroesophageal reflux disease)     Hyperlipidemia     Hypertension      Social History     Socioeconomic History    Marital status:    Tobacco Use    Smoking status: Former    Smokeless tobacco:  Never   Substance and Sexual Activity    Alcohol use: No    Drug use: No    Sexual activity: Yes     Past Surgical History:   Procedure Laterality Date    BREAST BIOPSY Left 1990'S    BENIGN    HYSTERECTOMY      INCONTINENCE SURGERY       Family History   Problem Relation Age of Onset    Diabetes Maternal Grandmother     Hypertension Maternal Grandmother     Diabetes Father     Breast cancer Mother 57    Hypertension Mother     Diabetes Sister     Ovarian cancer Neg Hx        Review of patient's allergies indicates:   Allergen Reactions    Flexeril [cyclobenzaprine] Other (See Comments)     Unknown was a long time ago.    Keflex [cephalexin] Diarrhea    Sulfa (sulfonamide antibiotics) Nausea Only    Augmentin [amoxicillin-pot clavulanate] Other (See Comments)     Gas       Current Outpatient Medications:     ALPRAZolam (XANAX) 1 MG tablet, Take 1 tablet (1 mg total) by mouth every evening., Disp: 30 tablet, Rfl: 2    blood-glucose meter kit, To check BG 1 times daily, to use with insurance preferred meter, Disp: 1 each, Rfl: 0    calcium carbonate/vitamin D3 (CALTRATE 600 PLUS D ORAL), Caltrate 600 plus D, Disp: , Rfl:     famotidine (PEPCID) 40 MG tablet, Take 1 tablet (40 mg total) by mouth once daily., Disp: 30 tablet, Rfl: 11    Lactobacillus rhamnosus GG (CULTURELLE) 10 billion cell capsule, Take 1 capsule by mouth once daily., Disp: , Rfl:     lancets 33 gauge Misc, 1 lancet by Misc.(Non-Drug; Combo Route) route 3 (three) times a week., Disp: 100 each, Rfl: 3    lancets Misc, To check BG 1 times daily, to use with insurance preferred meter, Disp: 200 each, Rfl: 1    melatonin 5 mg Cap, Take by mouth., Disp: , Rfl:     omega 3-dha-epa-fish oil 100-160-1,000 mg Cap, Fish Oil, Disp: , Rfl:     pantoprazole (PROTONIX) 40 MG tablet, Take 1 tablet (40 mg total) by mouth once daily., Disp: 30 tablet, Rfl: 11    atorvastatin (LIPITOR) 40 MG tablet, Take 1.5 tablets (60 mg total) by mouth once daily., Disp: 135 tablet,  "Rfl: 3    blood sugar diagnostic Strp, To check BG 1 times daily, to use with insurance preferred meter, Disp: 200 strip, Rfl: 1    estradioL (ESTRACE) 0.01 % (0.1 mg/gram) vaginal cream, Place 1 g vaginally every Mon, Wed, Fri., Disp: 12 g, Rfl: 11    hydrALAZINE (APRESOLINE) 25 MG tablet, Take 1 tablet (25 mg total) by mouth 2 (two) times a day., Disp: 60 tablet, Rfl: 5    hydroCHLOROthiazide (HYDRODIURIL) 12.5 MG Tab, Take 2 tablets (25 mg total) by mouth once daily., Disp: 180 tablet, Rfl: 1    losartan (COZAAR) 100 MG tablet, Take 1 tablet (100 mg total) by mouth once daily., Disp: 90 tablet, Rfl: 0    meloxicam (MOBIC) 15 MG tablet, Take 1 tablet (15 mg total) by mouth once daily., Disp: 90 tablet, Rfl: 1    metFORMIN (GLUCOPHAGE) 500 MG tablet, Take 1 tablet (500 mg total) by mouth 2 (two) times daily., Disp: 180 tablet, Rfl: 1    potassium chloride (MICRO-K) 10 MEQ CpSR, Take 1 capsule (10 mEq total) by mouth once daily., Disp: 90 capsule, Rfl: 1    Review of Systems   Constitutional:  Negative for chills, fever and unexpected weight change.   HENT:  Negative for ear pain, rhinorrhea and sore throat.    Eyes:  Negative for pain and visual disturbance.   Respiratory:  Negative for cough and shortness of breath.    Cardiovascular:  Negative for chest pain, palpitations and leg swelling.   Gastrointestinal:  Negative for abdominal pain, diarrhea, nausea and vomiting.   Genitourinary:  Negative for difficulty urinating, hematuria and vaginal bleeding.   Musculoskeletal:  Negative for arthralgias.   Skin:  Negative for rash.   Neurological:  Negative for dizziness, weakness and headaches.   Psychiatric/Behavioral:  Negative for agitation and sleep disturbance. The patient is not nervous/anxious.         Objective:      Vitals:    06/27/23 1143   BP: 110/60   Pulse: 75   SpO2: 98%   Weight: 62.1 kg (137 lb)   Height: 5' 2" (1.575 m)     Physical Exam  Vitals and nursing note reviewed.   Constitutional:       " Appearance: Normal appearance. She is well-developed.   HENT:      Head: Normocephalic and atraumatic.      Right Ear: External ear normal.      Left Ear: External ear normal.   Eyes:      Pupils: Pupils are equal, round, and reactive to light.   Cardiovascular:      Rate and Rhythm: Normal rate and regular rhythm.      Pulses:           Dorsalis pedis pulses are 2+ on the right side.        Posterior tibial pulses are 2+ on the right side.      Heart sounds: Normal heart sounds.   Pulmonary:      Effort: Pulmonary effort is normal.      Breath sounds: Normal breath sounds.   Abdominal:      General: Bowel sounds are normal.      Palpations: Abdomen is soft.   Musculoskeletal:         General: No deformity. Normal range of motion.      Cervical back: Normal range of motion and neck supple.      Right foot: Normal range of motion. No deformity.   Feet:      Right foot:      Protective Sensation: 5 sites tested.  5 sites sensed.      Left foot:      Protective Sensation: 5 sites tested.  5 sites sensed.      Skin integrity: No skin breakdown.   Lymphadenopathy:      Cervical: No cervical adenopathy.   Skin:     General: Skin is warm and dry.   Neurological:      Mental Status: She is alert and oriented to person, place, and time.   Psychiatric:         Behavior: Behavior normal.         Assessment:       1. Anxiety    2. Hypertension, unspecified type    3. Uncontrolled type 2 diabetes mellitus with hyperglycemia    4. Uncontrolled type 2 diabetes mellitus with hyperglycemia    5. Essential hypertension    6. Hyperlipidemia, unspecified hyperlipidemia type    7. Primary insomnia    8. Gastroesophageal reflux disease, unspecified whether esophagitis present    9. Osteoarthritis of multiple joints, unspecified osteoarthritis type         Plan:       Anxiety  Comments:  xanax controlling symptoms    Hypertension, unspecified type  Comments:  well controlled. continue as is  Orders:  -     hydroCHLOROthiazide  (HYDRODIURIL) 12.5 MG Tab; Take 2 tablets (25 mg total) by mouth once daily.  Dispense: 180 tablet; Refill: 1    Uncontrolled type 2 diabetes mellitus with hyperglycemia  Comments:  hga1c is 6.1. continue as is  Orders:  -     hydroCHLOROthiazide (HYDRODIURIL) 12.5 MG Tab; Take 2 tablets (25 mg total) by mouth once daily.  Dispense: 180 tablet; Refill: 1  -     metFORMIN (GLUCOPHAGE) 500 MG tablet; Take 1 tablet (500 mg total) by mouth 2 (two) times daily.  Dispense: 180 tablet; Refill: 1  -     atorvastatin (LIPITOR) 40 MG tablet; Take 1.5 tablets (60 mg total) by mouth once daily.  Dispense: 135 tablet; Refill: 3  -     blood sugar diagnostic Strp; To check BG 1 times daily, to use with insurance preferred meter  Dispense: 200 strip; Refill: 1  -     Hemoglobin A1C, POCT    Uncontrolled type 2 diabetes mellitus with hyperglycemia  Comments:  start diet. increase activity  Orders:  -     hydroCHLOROthiazide (HYDRODIURIL) 12.5 MG Tab; Take 2 tablets (25 mg total) by mouth once daily.  Dispense: 180 tablet; Refill: 1  -     metFORMIN (GLUCOPHAGE) 500 MG tablet; Take 1 tablet (500 mg total) by mouth 2 (two) times daily.  Dispense: 180 tablet; Refill: 1  -     atorvastatin (LIPITOR) 40 MG tablet; Take 1.5 tablets (60 mg total) by mouth once daily.  Dispense: 135 tablet; Refill: 3  -     blood sugar diagnostic Strp; To check BG 1 times daily, to use with insurance preferred meter  Dispense: 200 strip; Refill: 1  -     Hemoglobin A1C, POCT    Essential hypertension  -     hydrALAZINE (APRESOLINE) 25 MG tablet; Take 1 tablet (25 mg total) by mouth 2 (two) times a day.  Dispense: 60 tablet; Refill: 5  -     losartan (COZAAR) 100 MG tablet; Take 1 tablet (100 mg total) by mouth once daily.  Dispense: 90 tablet; Refill: 0    Hyperlipidemia, unspecified hyperlipidemia type  Comments:  continue lipitor  Orders:  -     atorvastatin (LIPITOR) 40 MG tablet; Take 1.5 tablets (60 mg total) by mouth once daily.  Dispense: 135  tablet; Refill: 3    Primary insomnia  Comments:  melatonin    Gastroesophageal reflux disease, unspecified whether esophagitis present  Comments:  continue protonix. controlling symptoms.     Osteoarthritis of multiple joints, unspecified osteoarthritis type  Comments:  mobic prn    Other orders  -     meloxicam (MOBIC) 15 MG tablet; Take 1 tablet (15 mg total) by mouth once daily.  Dispense: 90 tablet; Refill: 1  -     potassium chloride (MICRO-K) 10 MEQ CpSR; Take 1 capsule (10 mEq total) by mouth once daily.  Dispense: 90 capsule; Refill: 1      Follow up in about 3 months (around 9/27/2023) for medication management, Diabetic Check-Up.        6/27/2023 Karolina Ramirez

## 2023-07-28 ENCOUNTER — TELEPHONE (OUTPATIENT)
Dept: FAMILY MEDICINE | Facility: CLINIC | Age: 73
End: 2023-07-28

## 2023-07-28 NOTE — TELEPHONE ENCOUNTER
Spoke with patient who states she has a headache and sinus issues, states it just started yesterday. She has not taken anything over the counter. Just took ibuprofen. States she has a lot of things she cannot take over the counter because she has high blood pressure and diabetes and she would really like an abx called in to make sure this doesn't get worse over the weekend.

## 2023-07-28 NOTE — TELEPHONE ENCOUNTER
Sxs starting yesterday does not warrant abx treatment. She can take otc coricedin, flonase, antihistamine of her choice. If her symptoms persist for at minimum 10-14 days we may consider abx at that time.

## 2023-07-28 NOTE — TELEPHONE ENCOUNTER
----- Message from Olga Kyle sent at 7/28/2023 10:50 AM CDT -----  The patient has a sinus infection. She has a Headache, head and chest congestion. She does not have fever. Can you call in an antibiotic. [facility] Pt's #  [phone] GH

## 2023-07-28 NOTE — TELEPHONE ENCOUNTER
Spoke with patient gave recommendations verbatim per Nico. Patient verbalized understanding. Informed to take coricidin HBP due to her high blood pressure. Patient verbalized understanding.

## 2023-09-06 DIAGNOSIS — F41.9 ANXIETY: ICD-10-CM

## 2023-09-06 RX ORDER — ALPRAZOLAM 1 MG/1
1 TABLET ORAL NIGHTLY
Qty: 30 TABLET | Refills: 2 | Status: SHIPPED | OUTPATIENT
Start: 2023-09-06 | End: 2023-10-04 | Stop reason: SDUPTHER

## 2023-09-06 NOTE — TELEPHONE ENCOUNTER
----- Message from Kaley Deleon sent at 9/6/2023  2:44 PM CDT -----  Patient called and stated that she need a refill of her alprazolam called into [facility] on [address]. If any questions please give her a call at [phone]

## 2023-10-04 ENCOUNTER — OFFICE VISIT (OUTPATIENT)
Dept: FAMILY MEDICINE | Facility: CLINIC | Age: 73
End: 2023-10-04
Payer: MEDICARE

## 2023-10-04 VITALS
HEART RATE: 88 BPM | SYSTOLIC BLOOD PRESSURE: 130 MMHG | OXYGEN SATURATION: 98 % | BODY MASS INDEX: 26.17 KG/M2 | WEIGHT: 142.19 LBS | DIASTOLIC BLOOD PRESSURE: 82 MMHG | HEIGHT: 62 IN

## 2023-10-04 DIAGNOSIS — Z23 NEED FOR VACCINATION: ICD-10-CM

## 2023-10-04 DIAGNOSIS — E11.65 UNCONTROLLED TYPE 2 DIABETES MELLITUS WITH HYPERGLYCEMIA: Primary | ICD-10-CM

## 2023-10-04 DIAGNOSIS — I10 ESSENTIAL HYPERTENSION: ICD-10-CM

## 2023-10-04 DIAGNOSIS — K21.9 GASTROESOPHAGEAL REFLUX DISEASE, UNSPECIFIED WHETHER ESOPHAGITIS PRESENT: ICD-10-CM

## 2023-10-04 DIAGNOSIS — I10 HYPERTENSION, UNSPECIFIED TYPE: ICD-10-CM

## 2023-10-04 DIAGNOSIS — E11.65 UNCONTROLLED TYPE 2 DIABETES MELLITUS WITH HYPERGLYCEMIA: ICD-10-CM

## 2023-10-04 DIAGNOSIS — F41.9 ANXIETY: ICD-10-CM

## 2023-10-04 LAB — HBA1C MFR BLD: 6.4 %

## 2023-10-04 PROCEDURE — 4010F ACE/ARB THERAPY RXD/TAKEN: CPT | Mod: CPTII,S$GLB,, | Performed by: NURSE PRACTITIONER

## 2023-10-04 PROCEDURE — 3288F PR FALLS RISK ASSESSMENT DOCUMENTED: ICD-10-PCS | Mod: CPTII,S$GLB,, | Performed by: NURSE PRACTITIONER

## 2023-10-04 PROCEDURE — 83036 HEMOGLOBIN GLYCOSYLATED A1C: CPT | Mod: QW,,, | Performed by: NURSE PRACTITIONER

## 2023-10-04 PROCEDURE — 3288F FALL RISK ASSESSMENT DOCD: CPT | Mod: CPTII,S$GLB,, | Performed by: NURSE PRACTITIONER

## 2023-10-04 PROCEDURE — 3075F PR MOST RECENT SYSTOLIC BLOOD PRESS GE 130-139MM HG: ICD-10-PCS | Mod: CPTII,S$GLB,, | Performed by: NURSE PRACTITIONER

## 2023-10-04 PROCEDURE — 3008F BODY MASS INDEX DOCD: CPT | Mod: CPTII,S$GLB,, | Performed by: NURSE PRACTITIONER

## 2023-10-04 PROCEDURE — G0008 FLU VACCINE - QUADRIVALENT - ADJUVANTED: ICD-10-PCS | Mod: S$GLB,,, | Performed by: NURSE PRACTITIONER

## 2023-10-04 PROCEDURE — 3060F PR POS MICROALBUMINURIA RESULT DOCUMENTED/REVIEW: ICD-10-PCS | Mod: CPTII,S$GLB,, | Performed by: NURSE PRACTITIONER

## 2023-10-04 PROCEDURE — 90694 VACC AIIV4 NO PRSRV 0.5ML IM: CPT | Mod: S$GLB,,, | Performed by: NURSE PRACTITIONER

## 2023-10-04 PROCEDURE — 1159F MED LIST DOCD IN RCRD: CPT | Mod: CPTII,S$GLB,, | Performed by: NURSE PRACTITIONER

## 2023-10-04 PROCEDURE — 3066F NEPHROPATHY DOC TX: CPT | Mod: CPTII,S$GLB,, | Performed by: NURSE PRACTITIONER

## 2023-10-04 PROCEDURE — 99214 OFFICE O/P EST MOD 30 MIN: CPT | Mod: S$GLB,,, | Performed by: NURSE PRACTITIONER

## 2023-10-04 PROCEDURE — 3060F POS MICROALBUMINURIA REV: CPT | Mod: CPTII,S$GLB,, | Performed by: NURSE PRACTITIONER

## 2023-10-04 PROCEDURE — 1101F PR PT FALLS ASSESS DOC 0-1 FALLS W/OUT INJ PAST YR: ICD-10-PCS | Mod: CPTII,S$GLB,, | Performed by: NURSE PRACTITIONER

## 2023-10-04 PROCEDURE — 1160F PR REVIEW ALL MEDS BY PRESCRIBER/CLIN PHARMACIST DOCUMENTED: ICD-10-PCS | Mod: CPTII,S$GLB,, | Performed by: NURSE PRACTITIONER

## 2023-10-04 PROCEDURE — 1101F PT FALLS ASSESS-DOCD LE1/YR: CPT | Mod: CPTII,S$GLB,, | Performed by: NURSE PRACTITIONER

## 2023-10-04 PROCEDURE — 90694 FLU VACCINE - QUADRIVALENT - ADJUVANTED: ICD-10-PCS | Mod: S$GLB,,, | Performed by: NURSE PRACTITIONER

## 2023-10-04 PROCEDURE — 3079F DIAST BP 80-89 MM HG: CPT | Mod: CPTII,S$GLB,, | Performed by: NURSE PRACTITIONER

## 2023-10-04 PROCEDURE — 3075F SYST BP GE 130 - 139MM HG: CPT | Mod: CPTII,S$GLB,, | Performed by: NURSE PRACTITIONER

## 2023-10-04 PROCEDURE — 1160F RVW MEDS BY RX/DR IN RCRD: CPT | Mod: CPTII,S$GLB,, | Performed by: NURSE PRACTITIONER

## 2023-10-04 PROCEDURE — 3079F PR MOST RECENT DIASTOLIC BLOOD PRESSURE 80-89 MM HG: ICD-10-PCS | Mod: CPTII,S$GLB,, | Performed by: NURSE PRACTITIONER

## 2023-10-04 PROCEDURE — 83036 POCT HEMOGLOBIN A1C: ICD-10-PCS | Mod: QW,,, | Performed by: NURSE PRACTITIONER

## 2023-10-04 PROCEDURE — 1159F PR MEDICATION LIST DOCUMENTED IN MEDICAL RECORD: ICD-10-PCS | Mod: CPTII,S$GLB,, | Performed by: NURSE PRACTITIONER

## 2023-10-04 PROCEDURE — 3008F PR BODY MASS INDEX (BMI) DOCUMENTED: ICD-10-PCS | Mod: CPTII,S$GLB,, | Performed by: NURSE PRACTITIONER

## 2023-10-04 PROCEDURE — G0008 ADMIN INFLUENZA VIRUS VAC: HCPCS | Mod: S$GLB,,, | Performed by: NURSE PRACTITIONER

## 2023-10-04 PROCEDURE — 4010F PR ACE/ARB THEARPY RXD/TAKEN: ICD-10-PCS | Mod: CPTII,S$GLB,, | Performed by: NURSE PRACTITIONER

## 2023-10-04 PROCEDURE — 3066F PR DOCUMENTATION OF TREATMENT FOR NEPHROPATHY: ICD-10-PCS | Mod: CPTII,S$GLB,, | Performed by: NURSE PRACTITIONER

## 2023-10-04 PROCEDURE — 3044F PR MOST RECENT HEMOGLOBIN A1C LEVEL <7.0%: ICD-10-PCS | Mod: CPTII,S$GLB,, | Performed by: NURSE PRACTITIONER

## 2023-10-04 PROCEDURE — 3044F HG A1C LEVEL LT 7.0%: CPT | Mod: CPTII,S$GLB,, | Performed by: NURSE PRACTITIONER

## 2023-10-04 PROCEDURE — 99214 PR OFFICE/OUTPT VISIT, EST, LEVL IV, 30-39 MIN: ICD-10-PCS | Mod: S$GLB,,, | Performed by: NURSE PRACTITIONER

## 2023-10-04 RX ORDER — ALPRAZOLAM 1 MG/1
1 TABLET ORAL NIGHTLY
Qty: 30 TABLET | Refills: 2 | Status: SHIPPED | OUTPATIENT
Start: 2023-10-04 | End: 2024-01-05 | Stop reason: SDUPTHER

## 2023-10-04 RX ORDER — HYDRALAZINE HYDROCHLORIDE 25 MG/1
25 TABLET, FILM COATED ORAL 2 TIMES DAILY
Qty: 60 TABLET | Refills: 5 | Status: SHIPPED | OUTPATIENT
Start: 2023-10-04

## 2023-10-04 RX ORDER — HYDROCODONE BITARTRATE AND ACETAMINOPHEN 10; 325 MG/1; MG/1
1 TABLET ORAL EVERY 6 HOURS PRN
COMMUNITY

## 2023-10-04 RX ORDER — LOSARTAN POTASSIUM 100 MG/1
100 TABLET ORAL DAILY
Qty: 90 TABLET | Refills: 0 | Status: SHIPPED | OUTPATIENT
Start: 2023-10-04 | End: 2024-02-19 | Stop reason: SDUPTHER

## 2023-10-04 RX ORDER — HYDROCHLOROTHIAZIDE 12.5 MG/1
25 TABLET ORAL DAILY
Qty: 180 TABLET | Refills: 1 | Status: SHIPPED | OUTPATIENT
Start: 2023-10-04

## 2023-10-04 RX ORDER — PANTOPRAZOLE SODIUM 40 MG/1
40 TABLET, DELAYED RELEASE ORAL DAILY
Qty: 30 TABLET | Refills: 11 | Status: SHIPPED | OUTPATIENT
Start: 2023-10-04 | End: 2024-10-03

## 2023-10-04 RX ORDER — POTASSIUM CHLORIDE 750 MG/1
10 CAPSULE, EXTENDED RELEASE ORAL DAILY
Qty: 90 CAPSULE | Refills: 1 | Status: SHIPPED | OUTPATIENT
Start: 2023-10-04

## 2023-10-05 ENCOUNTER — PATIENT OUTREACH (OUTPATIENT)
Dept: ADMINISTRATIVE | Facility: HOSPITAL | Age: 73
End: 2023-10-05
Payer: MEDICARE

## 2023-10-05 NOTE — PROGRESS NOTES
Population Health Chart Review & Patient Outreach Details:     Reason for Outreach Encounter:     [x]  Non-Compliant Report   []  Payor Report ([employer], PHN, BCBS, MSSP, MCIP, UHC, etc.)   []  Pre-Visit Chart Review     Updates Requested / Reviewed:     []  Care Everywhere    []     []  External Sources ([facility], [facility], [facility], etc.)   [x]  Care Team Updated    Patient Outreach Method:    []  Telephone Outreach Completed   [] Successful   [] Left Voicemail   [] Unable to Contact (wrong number, no voicemail)  []  [email or web address] Portal Outreach Sent  []  Letter Outreach Mailed  [x]  Fax Sent for External Records  []  External Records Upload    Health Maintenance Topics Addressed and Outreach Outcomes / Actions Taken:        []      Breast Cancer Screening []  Mammo Scheduled      []  External Records Requested     []  Added Reminder to Complete to Upcoming Primary Care Appt Notes     []  Patient Declined     []  Patient Will Call Back to Schedule     []  Patient Will Schedule with External Provider / Order Routed if Applicable             []       Cervical Cancer Screening []  Pap Scheduled      []  External Records Requested     []  Added Reminder to Complete to Upcoming Primary Care Appt Notes     []  Patient Declined     []  Patient Will Call Back to Schedule     []  Patient Will Schedule with External Provider               []          Colorectal Cancer Screening []  Colonoscopy Case Request or Referral Placed     []  External Records Requested     []  Added Reminder to Complete to Upcoming Primary Care Appt Notes     []  Patient Declined     []  Patient Will Call Back to Schedule     []  Patient Will Schedule with External Provider     []  Fit Kit Mailed (add the SmartPhrase under additional notes)     []  Reminded Patient to Complete Home Test             [x]      Diabetic Eye Exam []  Eye Camera Scheduled or Optometry Referral Placed     [x]  External Records Requested     []  Added Reminder to Complete to  Upcoming Primary Care Appt Notes     []  Patient Declined     []  Patient Will Call Back to Schedule     []  Patient Will Schedule with External Provider             []      Blood Pressure Control []  Primary Care Follow Up Visit Scheduled     []  Remote Blood Pressure Reading Captured     []  Added Reminder to Complete to Upcoming Primary Care Appt Notes     []  Patient Declined     []  Patient Will Call Back / Patient Will Send Portal Message with Reading     []  Patient Will Call Back to Schedule Provider Visit             []       HbA1c & Other Labs []  Lab Appt Scheduled for Due Labs     []  Primary Care Follow Up Visit Scheduled      []  Reminded Patient to Complete Home Test     []  Added Reminder to Complete to Upcoming Primary Care Appt Notes     []  Patient Declined     []  Patient Will Call Back to Schedule     []  Patient Will Schedule with External Provider / Order Routed if Applicable           []    Schedule Primary Care Appt []  Primary Care Appt Scheduled     []  Patient Declined     []  Patient Will Call Back to Schedule     []  Pt Established with External Provider & Updated Care Team             []      Medication Adherence []  Primary Care Appointment Scheduled     []  Added Reminder to Upcoming Primary Care Appt Notes     []  Patient Reminded to  Prescription     []  Patient Declined, Provider Notified if Needed     []  Sent Provider Message to Review and/or Add Exclusion to Problem List             []      Osteoporosis Screening []  DXA Appointment Scheduled     []  External Records Requested     []  Added Reminder to Complete to Upcoming Primary Care Appt Notes     []  Patient Declined     []  Patient Will Call Back to Schedule     []  Patient Will Schedule with External Provider / Order Routed if Applicable     Additional Care Coordinator Notes:         Further Action Needed If Patient Returns Outreach:

## 2023-10-11 LAB
ALBUMIN/CREAT UR: 31 MCG/MG CREAT
APPEARANCE UR: CLEAR
BACTERIA #/AREA URNS HPF: ABNORMAL /HPF
BACTERIA UR CULT: ABNORMAL
BILIRUB UR QL STRIP: NEGATIVE
COLOR UR: YELLOW
CREAT UR-MCNC: 61 MG/DL (ref 20–275)
GLUCOSE UR QL STRIP: ABNORMAL
HGB UR QL STRIP: NEGATIVE
HYALINE CASTS #/AREA URNS LPF: ABNORMAL /LPF
KETONES UR QL STRIP: NEGATIVE
LEUKOCYTE ESTERASE UR QL STRIP: NEGATIVE
MICROALBUMIN UR-MCNC: 1.9 MG/DL
NITRITE UR QL STRIP: NEGATIVE
PH UR STRIP: 7.5 [PH] (ref 5–8)
PROT UR QL STRIP: NEGATIVE
RBC #/AREA URNS HPF: ABNORMAL /HPF
SERVICE CMNT-IMP: ABNORMAL
SP GR UR STRIP: 1.02 (ref 1–1.03)
SQUAMOUS #/AREA URNS HPF: ABNORMAL /HPF
WBC #/AREA URNS HPF: ABNORMAL /HPF

## 2023-10-12 ENCOUNTER — TELEPHONE (OUTPATIENT)
Dept: FAMILY MEDICINE | Facility: CLINIC | Age: 73
End: 2023-10-12

## 2023-10-12 NOTE — TELEPHONE ENCOUNTER
----- Message from Karolina Ramirez NP sent at 10/11/2023  4:05 PM CDT -----  Urine test are acceptable. Continue as is.

## 2023-10-18 NOTE — PROGRESS NOTES
SUBJECTIVE:    Patient ID: Kay Valencia is a 73 y.o. female.    Chief Complaint: Follow-up (Brought bottles//Pt is here for check up and medication refills//pt would like flu vaccine//-SC)    73-year-old female presents for check up. She is treated for diabetes, htn, oa, anxiety, insomnia, she is taking medications as prescribed. bp in office is well controlled. Does not check bp at home. a1c is 6.4mg/dl. trying to eat better. Stays active doing the day. Plans to have urine test done today. Sleeping better. Wants to get flu injection today      Office Visit on 10/04/2023   Component Date Value Ref Range Status    Hemoglobin A1C, POC 10/04/2023 6.4  % Final   Office Visit on 06/27/2023   Component Date Value Ref Range Status    Hemoglobin A1C, POC 06/27/2023 6.1  % Final       Past Medical History:   Diagnosis Date    Anxiety     Depression     Diabetes mellitus, type 2     GERD (gastroesophageal reflux disease)     Hyperlipidemia     Hypertension      Social History     Socioeconomic History    Marital status:    Tobacco Use    Smoking status: Former    Smokeless tobacco: Never   Substance and Sexual Activity    Alcohol use: No    Drug use: No    Sexual activity: Yes     Social Determinants of Health     Financial Resource Strain: Low Risk  (9/8/2020)    Overall Financial Resource Strain (CARDIA)     Difficulty of Paying Living Expenses: Not very hard   Transportation Needs: No Transportation Needs (9/8/2020)    PRAPARE - Transportation     Lack of Transportation (Medical): No     Lack of Transportation (Non-Medical): No   Stress: Stress Concern Present (9/8/2020)    [employer] - Occupational Stress Questionnaire     Feeling of Stress : To some extent   Social Connections: Unknown (9/8/2020)    Social Connection and Isolation Panel [NHANES]     Frequency of Communication with Friends and Family: More than three times a week     Frequency of Social Gatherings with Friends and  Family: Twice a week     Active Member of Clubs or Organizations: No     Attends Club or Organization Meetings: Never     Marital Status:      Past Surgical History:   Procedure Laterality Date    BREAST BIOPSY Left 1990'S    BENIGN    HYSTERECTOMY      INCONTINENCE SURGERY       Family History   Problem Relation Age of Onset    Diabetes Maternal Grandmother     Hypertension Maternal Grandmother     Diabetes Father     Breast cancer Mother 57    Hypertension Mother     Diabetes Sister     Ovarian cancer Neg Hx        All of your core healthy metrics are met.      Review of patient's allergies indicates:   Allergen Reactions    Flexeril [cyclobenzaprine] Other (See Comments)     Unknown was a long time ago.    Keflex [cephalexin] Diarrhea    Sulfa (sulfonamide antibiotics) Nausea Only    Augmentin [amoxicillin-pot clavulanate] Other (See Comments)     Gas       Current Outpatient Medications:     atorvastatin (LIPITOR) 40 MG tablet, Take 1.5 tablets (60 mg total) by mouth once daily., Disp: 135 tablet, Rfl: 3    blood sugar diagnostic Strp, To check BG 1 times daily, to use with insurance preferred meter, Disp: 200 strip, Rfl: 1    blood-glucose meter kit, To check BG 1 times daily, to use with insurance preferred meter, Disp: 1 each, Rfl: 0    calcium carbonate/vitamin D3 (CALTRATE 600 PLUS D ORAL), Caltrate 600 plus D, Disp: , Rfl:     famotidine (PEPCID) 40 MG tablet, Take 1 tablet (40 mg total) by mouth once daily., Disp: 30 tablet, Rfl: 11    HYDROcodone-acetaminophen (NORCO)  mg per tablet, Take 1 tablet by mouth every 6 (six) hours as needed for Pain., Disp: , Rfl:     Lactobacillus rhamnosus GG (CULTURELLE) 10 billion cell capsule, Take 1 capsule by mouth once daily., Disp: , Rfl:     lancets 33 gauge Misc, 1 lancet by Misc.(Non-Drug; Combo Route) route 3 (three) times a week., Disp: 100 each, Rfl: 3    lancets Misc, To check BG 1 times daily, to use with insurance preferred meter, Disp: 200  each, Rfl: 1    melatonin 5 mg Cap, Take by mouth., Disp: , Rfl:     meloxicam (MOBIC) 15 MG tablet, Take 1 tablet (15 mg total) by mouth once daily., Disp: 90 tablet, Rfl: 1    metFORMIN (GLUCOPHAGE) 500 MG tablet, Take 1 tablet (500 mg total) by mouth 2 (two) times daily., Disp: 180 tablet, Rfl: 1    omega 3-dha-epa-fish oil 100-160-1,000 mg Cap, Fish Oil, Disp: , Rfl:     ALPRAZolam (XANAX) 1 MG tablet, Take 1 tablet (1 mg total) by mouth every evening., Disp: 30 tablet, Rfl: 2    hydrALAZINE (APRESOLINE) 25 MG tablet, Take 1 tablet (25 mg total) by mouth 2 (two) times a day., Disp: 60 tablet, Rfl: 5    hydroCHLOROthiazide (HYDRODIURIL) 12.5 MG Tab, Take 2 tablets (25 mg total) by mouth once daily., Disp: 180 tablet, Rfl: 1    losartan (COZAAR) 100 MG tablet, Take 1 tablet (100 mg total) by mouth once daily., Disp: 90 tablet, Rfl: 0    pantoprazole (PROTONIX) 40 MG tablet, Take 1 tablet (40 mg total) by mouth once daily., Disp: 30 tablet, Rfl: 11    potassium chloride (MICRO-K) 10 MEQ CpSR, Take 1 capsule (10 mEq total) by mouth once daily., Disp: 90 capsule, Rfl: 1    Review of Systems   Constitutional:  Negative for chills, fever and unexpected weight change.   HENT:  Negative for ear pain, rhinorrhea and sore throat.    Eyes:  Negative for pain and visual disturbance.   Respiratory:  Negative for cough and shortness of breath.    Cardiovascular:  Negative for chest pain, palpitations and leg swelling.   Gastrointestinal:  Negative for abdominal pain, diarrhea, nausea and vomiting.   Genitourinary:  Negative for difficulty urinating, hematuria and vaginal bleeding.   Musculoskeletal:  Negative for arthralgias.   Skin:  Negative for rash.   Neurological:  Negative for dizziness, weakness and headaches.   Psychiatric/Behavioral:  Negative for agitation and sleep disturbance. The patient is not nervous/anxious.           Objective:      Vitals:    10/04/23 1344 10/04/23 1353   BP: (!) 140/74 130/82   Pulse: 80 88  "  SpO2: 98% 98%   Weight: 64.5 kg (142 lb 3.2 oz) 64.5 kg (142 lb 3.2 oz)   Height: 5' 2" (1.575 m) 5' 2" (1.575 m)     Physical Exam  Vitals and nursing note reviewed.   Constitutional:       General: She is not in acute distress.     Appearance: Normal appearance. She is well-developed.   HENT:      Head: Normocephalic.      Right Ear: External ear normal.      Left Ear: External ear normal.   Eyes:      Conjunctiva/sclera: Conjunctivae normal.      Pupils: Pupils are equal, round, and reactive to light.   Neck:      Vascular: No JVD.   Cardiovascular:      Rate and Rhythm: Normal rate and regular rhythm.      Heart sounds: No murmur heard.  Pulmonary:      Effort: Pulmonary effort is normal.      Breath sounds: Normal breath sounds.   Abdominal:      General: Bowel sounds are normal.      Palpations: Abdomen is soft.   Musculoskeletal:         General: No deformity. Normal range of motion.      Cervical back: Normal range of motion and neck supple.   Feet:      Right foot:      Protective Sensation: 5 sites tested.  5 sites sensed.      Left foot:      Protective Sensation: 5 sites tested.  5 sites sensed.   Lymphadenopathy:      Cervical: No cervical adenopathy.   Skin:     General: Skin is warm and dry.      Findings: No rash.   Neurological:      Mental Status: She is alert and oriented to person, place, and time.      Gait: Gait normal.   Psychiatric:         Speech: Speech normal.         Behavior: Behavior normal.           Assessment:       1. Uncontrolled type 2 diabetes mellitus with hyperglycemia    2. Anxiety    3. Hypertension, unspecified type    4. Uncontrolled type 2 diabetes mellitus with hyperglycemia    5. Essential hypertension    6. Gastroesophageal reflux disease, unspecified whether esophagitis present    7. Need for vaccination         Plan:       Uncontrolled type 2 diabetes mellitus with hyperglycemia  Comments:  hga1c is well controlled . 6.4  Orders:  -     Hemoglobin A1C, POCT  -     " hydroCHLOROthiazide (HYDRODIURIL) 12.5 MG Tab; Take 2 tablets (25 mg total) by mouth once daily.  Dispense: 180 tablet; Refill: 1    Anxiety  Comments:  continue xanax prn.   Orders:  -     ALPRAZolam (XANAX) 1 MG tablet; Take 1 tablet (1 mg total) by mouth every evening.  Dispense: 30 tablet; Refill: 2    Hypertension, unspecified type  Comments:  well controlled. continue as is  Orders:  -     hydroCHLOROthiazide (HYDRODIURIL) 12.5 MG Tab; Take 2 tablets (25 mg total) by mouth once daily.  Dispense: 180 tablet; Refill: 1    Uncontrolled type 2 diabetes mellitus with hyperglycemia  Comments:  hga1c is 6.1. continue as is  Orders:  -     Hemoglobin A1C, POCT  -     hydroCHLOROthiazide (HYDRODIURIL) 12.5 MG Tab; Take 2 tablets (25 mg total) by mouth once daily.  Dispense: 180 tablet; Refill: 1    Essential hypertension  -     hydrALAZINE (APRESOLINE) 25 MG tablet; Take 1 tablet (25 mg total) by mouth 2 (two) times a day.  Dispense: 60 tablet; Refill: 5  -     losartan (COZAAR) 100 MG tablet; Take 1 tablet (100 mg total) by mouth once daily.  Dispense: 90 tablet; Refill: 0    Gastroesophageal reflux disease, unspecified whether esophagitis present  Comments:  protonix  Orders:  -     pantoprazole (PROTONIX) 40 MG tablet; Take 1 tablet (40 mg total) by mouth once daily.  Dispense: 30 tablet; Refill: 11    Need for vaccination  -     Influenza (FLUAD) - Quadrivalent (Adjuvanted) *Preferred* (65+) (PF)    Other orders  -     potassium chloride (MICRO-K) 10 MEQ CpSR; Take 1 capsule (10 mEq total) by mouth once daily.  Dispense: 90 capsule; Refill: 1      Follow up in about 3 months (around 1/4/2024), or if symptoms worsen or fail to improve, for medication management, Diabetic Check-Up.        10/30/2023 Karolina Ramirez

## 2023-11-06 DIAGNOSIS — Z12.31 ENCOUNTER FOR SCREENING MAMMOGRAM FOR MALIGNANT NEOPLASM OF BREAST: Primary | ICD-10-CM

## 2023-11-30 ENCOUNTER — TELEPHONE (OUTPATIENT)
Dept: FAMILY MEDICINE | Facility: CLINIC | Age: 73
End: 2023-11-30

## 2023-11-30 ENCOUNTER — HOSPITAL ENCOUNTER (OUTPATIENT)
Dept: RADIOLOGY | Facility: HOSPITAL | Age: 73
Discharge: HOME | End: 2023-11-30
Attending: NURSE PRACTITIONER
Payer: MEDICARE

## 2023-11-30 DIAGNOSIS — Z12.31 ENCOUNTER FOR SCREENING MAMMOGRAM FOR MALIGNANT NEOPLASM OF BREAST: ICD-10-CM

## 2023-11-30 PROCEDURE — 77067 SCR MAMMO BI INCL CAD: CPT | Mod: TC,PO

## 2024-01-05 DIAGNOSIS — F41.9 ANXIETY: ICD-10-CM

## 2024-01-05 RX ORDER — ALPRAZOLAM 1 MG/1
1 TABLET ORAL NIGHTLY
Qty: 30 TABLET | Refills: 2 | Status: SHIPPED | OUTPATIENT
Start: 2024-01-07

## 2024-01-05 NOTE — TELEPHONE ENCOUNTER
----- Message from Sumi Maki sent at 1/5/2024  8:27 AM CST -----  Pt needs zanax refilled and sent to [facility] on [address] in [place].  [phone]

## 2024-01-05 NOTE — TELEPHONE ENCOUNTER
Last OV 10/4/2023  Upcoming OV 1/11/2024  UDS 3/1/2023    Unable to run . Pt in [place]. Per chart last dispensed 12/08/2023     Rx order set up.

## 2024-01-11 ENCOUNTER — OFFICE VISIT (OUTPATIENT)
Dept: FAMILY MEDICINE | Facility: CLINIC | Age: 74
End: 2024-01-11
Payer: MEDICARE

## 2024-01-11 DIAGNOSIS — E11.65 UNCONTROLLED TYPE 2 DIABETES MELLITUS WITH HYPERGLYCEMIA: Primary | ICD-10-CM

## 2024-01-11 DIAGNOSIS — E78.5 HYPERLIPIDEMIA, UNSPECIFIED HYPERLIPIDEMIA TYPE: ICD-10-CM

## 2024-01-11 DIAGNOSIS — F41.9 ANXIETY: ICD-10-CM

## 2024-01-11 DIAGNOSIS — Z79.899 ENCOUNTER FOR LONG-TERM (CURRENT) USE OF OTHER MEDICATIONS: ICD-10-CM

## 2024-01-11 DIAGNOSIS — Z51.81 ENCOUNTER FOR THERAPEUTIC DRUG MONITORING: ICD-10-CM

## 2024-01-11 DIAGNOSIS — K21.9 GASTROESOPHAGEAL REFLUX DISEASE, UNSPECIFIED WHETHER ESOPHAGITIS PRESENT: ICD-10-CM

## 2024-01-11 DIAGNOSIS — F51.01 PRIMARY INSOMNIA: ICD-10-CM

## 2024-01-11 DIAGNOSIS — I10 ESSENTIAL HYPERTENSION: ICD-10-CM

## 2024-01-11 DIAGNOSIS — D69.6 THROMBOCYTOPENIA, UNSPECIFIED: ICD-10-CM

## 2024-01-11 LAB — HBA1C MFR BLD: 7 %

## 2024-01-11 PROCEDURE — 1126F AMNT PAIN NOTED NONE PRSNT: CPT | Mod: CPTII,S$GLB,, | Performed by: NURSE PRACTITIONER

## 2024-01-11 PROCEDURE — 1101F PT FALLS ASSESS-DOCD LE1/YR: CPT | Mod: CPTII,S$GLB,, | Performed by: NURSE PRACTITIONER

## 2024-01-11 PROCEDURE — 3288F FALL RISK ASSESSMENT DOCD: CPT | Mod: CPTII,S$GLB,, | Performed by: NURSE PRACTITIONER

## 2024-01-11 PROCEDURE — 3066F NEPHROPATHY DOC TX: CPT | Mod: CPTII,S$GLB,, | Performed by: NURSE PRACTITIONER

## 2024-01-11 PROCEDURE — 3075F SYST BP GE 130 - 139MM HG: CPT | Mod: CPTII,S$GLB,, | Performed by: NURSE PRACTITIONER

## 2024-01-11 PROCEDURE — 83036 HEMOGLOBIN GLYCOSYLATED A1C: CPT | Mod: QW,,, | Performed by: NURSE PRACTITIONER

## 2024-01-11 PROCEDURE — 1160F RVW MEDS BY RX/DR IN RCRD: CPT | Mod: CPTII,S$GLB,, | Performed by: NURSE PRACTITIONER

## 2024-01-11 PROCEDURE — 99214 OFFICE O/P EST MOD 30 MIN: CPT | Mod: S$GLB,,, | Performed by: NURSE PRACTITIONER

## 2024-01-11 PROCEDURE — 3051F HG A1C>EQUAL 7.0%<8.0%: CPT | Mod: CPTII,S$GLB,, | Performed by: NURSE PRACTITIONER

## 2024-01-11 PROCEDURE — 3008F BODY MASS INDEX DOCD: CPT | Mod: CPTII,S$GLB,, | Performed by: NURSE PRACTITIONER

## 2024-01-11 PROCEDURE — 3078F DIAST BP <80 MM HG: CPT | Mod: CPTII,S$GLB,, | Performed by: NURSE PRACTITIONER

## 2024-01-11 PROCEDURE — 3061F NEG MICROALBUMINURIA REV: CPT | Mod: CPTII,S$GLB,, | Performed by: NURSE PRACTITIONER

## 2024-01-11 PROCEDURE — 1159F MED LIST DOCD IN RCRD: CPT | Mod: CPTII,S$GLB,, | Performed by: NURSE PRACTITIONER

## 2024-01-11 RX ORDER — TIZANIDINE 4 MG/1
4 TABLET ORAL EVERY 6 HOURS PRN
Qty: 30 TABLET | Refills: 0 | Status: SHIPPED | OUTPATIENT
Start: 2024-01-11 | End: 2024-01-21

## 2024-01-11 RX ORDER — BUSPIRONE HYDROCHLORIDE 10 MG/1
10 TABLET ORAL 3 TIMES DAILY
Qty: 90 TABLET | Refills: 11 | Status: SHIPPED | OUTPATIENT
Start: 2024-01-11 | End: 2025-01-10

## 2024-01-11 NOTE — PROGRESS NOTES
SUBJECTIVE:    Patient ID: Kay Valencia is a 73 y.o. female.    Chief Complaint: Follow-up (Brought bottles. Pt is here for 3 month follow up.)    73-year-old female presents for check up. She is treated for diabetes, htn, oa, anxiety, insomnia, she is taking medications as prescribed. bp in office is well controlled. Does not check bp at home. a1c is 7.0mg/dl. this is up from previous. Not watching diet as much. Not as active. Has been experiencing some anxiety. Has been tried on ssri in the past with negative effects. Would like to discuss. Takes xanax at bedtime. Sleeps ok. Bp is ok.         Office Visit on 01/11/2024   Component Date Value Ref Range Status    Hemoglobin A1C, POC 01/11/2024 7.0  % Final    WBC 01/11/2024 4.2  3.8 - 10.8 Thousand/uL Final    RBC 01/11/2024 3.71 (L)  3.80 - 5.10 Million/uL Final    Hemoglobin 01/11/2024 11.2 (L)  11.7 - 15.5 g/dL Final    Hematocrit 01/11/2024 34.0 (L)  35.0 - 45.0 % Final    MCV 01/11/2024 91.6  80.0 - 100.0 fL Final    MCH 01/11/2024 30.2  27.0 - 33.0 pg Final    MCHC 01/11/2024 32.9  32.0 - 36.0 g/dL Final    RDW 01/11/2024 12.8  11.0 - 15.0 % Final    Platelets 01/11/2024 147  140 - 400 Thousand/uL Final    MPV 01/11/2024 10.8  7.5 - 12.5 fL Final    Neutrophils, Abs 01/11/2024 2,306  1,500 - 7,800 cells/uL Final    Lymph # 01/11/2024 1,130  850 - 3,900 cells/uL Final    Mono # 01/11/2024 487  200 - 950 cells/uL Final    Eos # 01/11/2024 218  15 - 500 cells/uL Final    Baso # 01/11/2024 59  0 - 200 cells/uL Final    Neutrophils Relative 01/11/2024 54.9  % Final    Lymph % 01/11/2024 26.9  % Final    Mono % 01/11/2024 11.6  % Final    Eosinophil % 01/11/2024 5.2  % Final    Basophil % 01/11/2024 1.4  % Final    Glucose 01/11/2024 107 (H)  65 - 99 mg/dL Final    BUN 01/11/2024 25  7 - 25 mg/dL Final    Creatinine 01/11/2024 1.08 (H)  0.60 - 1.00 mg/dL Final    eGFR 01/11/2024 55 (L)  > OR = 60 mL/min/1.73m2 Final    BUN/Creatinine Ratio 01/11/2024 23 (H)  6 -  22 (calc) Final    Sodium 01/11/2024 140  135 - 146 mmol/L Final    Potassium 01/11/2024 4.4  3.5 - 5.3 mmol/L Final    Chloride 01/11/2024 102  98 - 110 mmol/L Final    CO2 01/11/2024 28  20 - 32 mmol/L Final    Calcium 01/11/2024 10.3  8.6 - 10.4 mg/dL Final    Total Protein 01/11/2024 6.9  6.1 - 8.1 g/dL Final    Albumin 01/11/2024 4.6  3.6 - 5.1 g/dL Final    Globulin, Total 01/11/2024 2.3  1.9 - 3.7 g/dL (calc) Final    Albumin/Globulin Ratio 01/11/2024 2.0  1.0 - 2.5 (calc) Final    Total Bilirubin 01/11/2024 0.8  0.2 - 1.2 mg/dL Final    Alkaline Phosphatase 01/11/2024 67  37 - 153 U/L Final    AST 01/11/2024 22  10 - 35 U/L Final    ALT 01/11/2024 27  6 - 29 U/L Final    Cholesterol 01/11/2024 203 (H)  <200 mg/dL Final    HDL 01/11/2024 50  > OR = 50 mg/dL Final    Triglycerides 01/11/2024 273 (H)  <150 mg/dL Final    LDL Cholesterol 01/11/2024 114 (H)  mg/dL (calc) Final    HDL/Cholesterol Ratio 01/11/2024 4.1  <5.0 (calc) Final    Non HDL Chol. (LDL+VLDL) 01/11/2024 153 (H)  <130 mg/dL (calc) Final    TSH w/reflex to FT4 01/11/2024 0.63  0.40 - 4.50 mIU/L Final    Creatinine, Urine 01/11/2024 29  20 - 275 mg/dL Final    Microalb, Ur 01/11/2024 0.6  See Note: mg/dL Final    Microalb/Creat Ratio 01/11/2024 21  <30 mcg/mg creat Final    Color, UA 01/11/2024 YELLOW  YELLOW Final    Appearance, UA 01/11/2024 CLEAR  CLEAR Final    Specific Gravity, UA 01/11/2024 1.008  1.001 - 1.035 Final    pH, UA 01/11/2024 8.0  5.0 - 8.0 Final    Glucose, UA 01/11/2024 NEGATIVE  NEGATIVE Final    Bilirubin, UA 01/11/2024 NEGATIVE  NEGATIVE Final    Ketones, UA 01/11/2024 NEGATIVE  NEGATIVE Final    Occult Blood UA 01/11/2024 NEGATIVE  NEGATIVE Final    Protein, UA 01/11/2024 NEGATIVE  NEGATIVE Final    Nitrite, UA 01/11/2024 NEGATIVE  NEGATIVE Final    Leukocytes, UA 01/11/2024 1+ (A)  NEGATIVE Final    WBC Casts, UA 01/11/2024 NONE SEEN  < OR = 5 /HPF Final    RBC Casts, UA 01/11/2024 NONE SEEN  < OR = 2 /HPF Final    Squam  Epithel, UA 01/11/2024 0-5  < OR = 5 /HPF Final    Bacteria, UA 01/11/2024 NONE SEEN  NONE SEEN /HPF Final    Hyaline Casts, UA 01/11/2024 NONE SEEN  NONE SEEN /LPF Final    Service Cmt: 01/11/2024    Final    Reflexive Urine Culture 01/11/2024    Final    Urine Culture, Routine 01/11/2024    Final   Office Visit on 10/04/2023   Component Date Value Ref Range Status    Hemoglobin A1C, POC 10/04/2023 6.4  % Final       Past Medical History:   Diagnosis Date    Anxiety     Depression     Diabetes mellitus, type 2     GERD (gastroesophageal reflux disease)     Hyperlipidemia     Hypertension      Social History     Socioeconomic History    Marital status:    Tobacco Use    Smoking status: Former    Smokeless tobacco: Never   Substance and Sexual Activity    Alcohol use: No    Drug use: No    Sexual activity: Yes     Social Determinants of Health     Financial Resource Strain: Low Risk  (9/8/2020)    Overall Financial Resource Strain (CARDIA)     Difficulty of Paying Living Expenses: Not very hard   Transportation Needs: No Transportation Needs (9/8/2020)    PRAPARE - Transportation     Lack of Transportation (Medical): No     Lack of Transportation (Non-Medical): No   Stress: Stress Concern Present (9/8/2020)    [place] [place] [employer] - Occupational Stress Questionnaire     Feeling of Stress : To some extent   Social Connections: Unknown (9/8/2020)    Social Connection and Isolation Panel [NHANES]     Frequency of Communication with Friends and Family: More than three times a week     Frequency of Social Gatherings with Friends and Family: Twice a week     Active Member of Clubs or Organizations: No     Attends Club or Organization Meetings: Never     Marital Status:      Past Surgical History:   Procedure Laterality Date    BREAST BIOPSY Left 1990'S    BENIGN    HYSTERECTOMY      INCONTINENCE SURGERY       Family History   Problem Relation Age of Onset    Diabetes Maternal Grandmother      Hypertension Maternal Grandmother     Diabetes Father     Breast cancer Mother 57    Hypertension Mother     Diabetes Sister     Ovarian cancer Neg Hx        Tests to Keep You Healthy    Mammogram: Met on 11/30/2023  Eye Exam: Met on 8/22/2023  Colon Cancer Screening: Met on 9/21/2017  Last Blood Pressure <= 139/89 (1/11/2024): NO  Last HbA1c < 8 (01/11/2024): Yes      Review of patient's allergies indicates:   Allergen Reactions    Flexeril [cyclobenzaprine] Other (See Comments)     Unknown was a long time ago.    Keflex [cephalexin] Diarrhea    Sulfa (sulfonamide antibiotics) Nausea Only    Augmentin [amoxicillin-pot clavulanate] Other (See Comments)     Gas       Current Outpatient Medications:     ALPRAZolam (XANAX) 1 MG tablet, Take 1 tablet (1 mg total) by mouth every evening., Disp: 30 tablet, Rfl: 2    atorvastatin (LIPITOR) 40 MG tablet, Take 1.5 tablets (60 mg total) by mouth once daily., Disp: 135 tablet, Rfl: 3    blood sugar diagnostic Strp, To check BG 1 times daily, to use with insurance preferred meter, Disp: 200 strip, Rfl: 1    calcium carbonate/vitamin D3 (CALTRATE 600 PLUS D ORAL), Caltrate 600 plus D, Disp: , Rfl:     hydrALAZINE (APRESOLINE) 25 MG tablet, Take 1 tablet (25 mg total) by mouth 2 (two) times a day., Disp: 60 tablet, Rfl: 5    hydroCHLOROthiazide (HYDRODIURIL) 12.5 MG Tab, Take 2 tablets (25 mg total) by mouth once daily., Disp: 180 tablet, Rfl: 1    HYDROcodone-acetaminophen (NORCO)  mg per tablet, Take 1 tablet by mouth every 6 (six) hours as needed for Pain., Disp: , Rfl:     lancets 33 gauge Misc, 1 lancet by Misc.(Non-Drug; Combo Route) route 3 (three) times a week., Disp: 100 each, Rfl: 3    lancets Misc, To check BG 1 times daily, to use with insurance preferred meter, Disp: 200 each, Rfl: 1    melatonin 5 mg Cap, Take by mouth., Disp: , Rfl:     meloxicam (MOBIC) 15 MG tablet, Take 1 tablet (15 mg total) by mouth once daily., Disp: 90 tablet, Rfl: 1    metFORMIN  "(GLUCOPHAGE) 500 MG tablet, Take 1 tablet (500 mg total) by mouth 2 (two) times daily., Disp: 180 tablet, Rfl: 1    omega 3-dha-epa-fish oil 100-160-1,000 mg Cap, Fish Oil, Disp: , Rfl:     pantoprazole (PROTONIX) 40 MG tablet, Take 1 tablet (40 mg total) by mouth once daily., Disp: 30 tablet, Rfl: 11    potassium chloride (MICRO-K) 10 MEQ CpSR, Take 1 capsule (10 mEq total) by mouth once daily., Disp: 90 capsule, Rfl: 1    blood-glucose meter kit, To check BG 1 times daily, to use with insurance preferred meter, Disp: 1 each, Rfl: 0    busPIRone (BUSPAR) 10 MG tablet, Take 1 tablet (10 mg total) by mouth 3 (three) times daily., Disp: 90 tablet, Rfl: 11    famotidine (PEPCID) 40 MG tablet, Take 1 tablet (40 mg total) by mouth once daily., Disp: 30 tablet, Rfl: 11    Lactobacillus rhamnosus GG (CULTURELLE) 10 billion cell capsule, Take 1 capsule by mouth once daily., Disp: , Rfl:     losartan (COZAAR) 100 MG tablet, Take 1 tablet (100 mg total) by mouth once daily., Disp: 90 tablet, Rfl: 0    Review of Systems   Constitutional:  Negative for chills, fever and unexpected weight change.   HENT:  Negative for ear pain, rhinorrhea and sore throat.    Eyes:  Negative for pain and visual disturbance.   Respiratory:  Negative for cough and shortness of breath.    Cardiovascular:  Negative for chest pain, palpitations and leg swelling.   Gastrointestinal:  Negative for abdominal pain, diarrhea, nausea and vomiting.   Genitourinary:  Negative for difficulty urinating, hematuria and vaginal bleeding.   Musculoskeletal:  Negative for arthralgias.   Skin:  Negative for rash.   Neurological:  Negative for dizziness, weakness and headaches.   Psychiatric/Behavioral:  Negative for agitation and sleep disturbance. The patient is nervous/anxious.           Objective:      Vitals:    01/11/24 1312 01/11/24 1320   BP: (!) 144/64 130/60   Pulse: 75    SpO2: 99%    Weight: 64.4 kg (142 lb)    Height: 5' 2" (1.575 m)      Physical " Exam  Vitals and nursing note reviewed.   Constitutional:       General: She is not in acute distress.     Appearance: Normal appearance. She is well-developed.   HENT:      Head: Normocephalic.      Right Ear: External ear normal.      Left Ear: External ear normal.   Eyes:      Conjunctiva/sclera: Conjunctivae normal.      Pupils: Pupils are equal, round, and reactive to light.   Neck:      Vascular: No JVD.   Cardiovascular:      Rate and Rhythm: Normal rate and regular rhythm.      Heart sounds: No murmur heard.  Pulmonary:      Effort: Pulmonary effort is normal.      Breath sounds: Normal breath sounds.   Abdominal:      General: Bowel sounds are normal.      Palpations: Abdomen is soft.   Musculoskeletal:         General: No deformity. Normal range of motion.      Cervical back: Normal range of motion and neck supple.   Feet:      Right foot:      Protective Sensation: 5 sites tested.  2 sites sensed.      Left foot:      Protective Sensation: 5 sites tested.  5 sites sensed.   Lymphadenopathy:      Cervical: No cervical adenopathy.   Skin:     General: Skin is warm and dry.      Findings: No rash.   Neurological:      Mental Status: She is alert and oriented to person, place, and time.      Gait: Gait normal.   Psychiatric:         Mood and Affect: Mood and affect normal.         Speech: Speech normal.         Behavior: Behavior normal.           Assessment:       1. Uncontrolled type 2 diabetes mellitus with hyperglycemia    2. Anxiety    3. Encounter for long-term (current) use of other medications    4. Encounter for therapeutic drug monitoring    5. Hyperlipidemia, unspecified hyperlipidemia type    6. Gastroesophageal reflux disease, unspecified whether esophagitis present    7. Primary insomnia    8. Essential hypertension    9. Thrombocytopenia, unspecified         Plan:       Uncontrolled type 2 diabetes mellitus with hyperglycemia  Comments:  work on diet  Orders:  -     Hemoglobin A1C, POCT  -      CBC Auto Differential; Future; Expected date: 01/11/2024  -     Comprehensive Metabolic Panel; Future; Expected date: 01/11/2024  -     Lipid Panel; Future; Expected date: 01/11/2024  -     TSH w/reflex to FT4; Future; Expected date: 01/11/2024  -     Microalbumin/Creatinine Ratio, Urine; Future; Expected date: 01/11/2024  -     Urinalysis, Reflex to Urine Culture Urine, Clean Catch; Future; Expected date: 01/11/2024    Anxiety  Comments:  try buspar during the day  Orders:  -     CBC Auto Differential; Future; Expected date: 01/11/2024  -     Comprehensive Metabolic Panel; Future; Expected date: 01/11/2024  -     Lipid Panel; Future; Expected date: 01/11/2024  -     TSH w/reflex to FT4; Future; Expected date: 01/11/2024  -     Microalbumin/Creatinine Ratio, Urine; Future; Expected date: 01/11/2024  -     Urinalysis, Reflex to Urine Culture Urine, Clean Catch; Future; Expected date: 01/11/2024    Encounter for long-term (current) use of other medications    Encounter for therapeutic drug monitoring    Hyperlipidemia, unspecified hyperlipidemia type  Comments:  lipitor  Orders:  -     CBC Auto Differential; Future; Expected date: 01/11/2024  -     Comprehensive Metabolic Panel; Future; Expected date: 01/11/2024  -     Lipid Panel; Future; Expected date: 01/11/2024  -     TSH w/reflex to FT4; Future; Expected date: 01/11/2024  -     Microalbumin/Creatinine Ratio, Urine; Future; Expected date: 01/11/2024  -     Urinalysis, Reflex to Urine Culture Urine, Clean Catch; Future; Expected date: 01/11/2024    Gastroesophageal reflux disease, unspecified whether esophagitis present    Primary insomnia  Comments:  xanax prn \[facility] [place] [facility]\""  Orders:  -     CBC Auto Differential; Future; Expected date: 01/11/2024  -     Comprehensive Metabolic Panel; Future; Expected date: 01/11/2024  -     Lipid Panel; Future; Expected date: 01/11/2024  -     TSH w/reflex to FT4; Future; Expected date: 01/11/2024  -     Microalbumin/Creatinine Ratio, Urine;  Future; Expected date: 01/11/2024  -     Urinalysis, Reflex to Urine Culture Urine, Clean Catch; Future; Expected date: 01/11/2024    Essential hypertension  Comments:  bp controlled    Thrombocytopenia, unspecified    Other orders  -     busPIRone (BUSPAR) 10 MG tablet; Take 1 tablet (10 mg total) by mouth 3 (three) times daily.  Dispense: 90 tablet; Refill: 11  -     tiZANidine (ZANAFLEX) 4 MG tablet; Take 1 tablet (4 mg total) by mouth every 6 (six) hours as needed.  Dispense: 30 tablet; Refill: 0  -     Microalbumin/Creatinine Ratio, Urine  -     Urinalysis, Reflex to Urine Culture      Follow up in about 3 months (around 4/11/2024), or if symptoms worsen or fail to improve, for medication management.        2/12/2024 Karolina Ramirez

## 2024-01-13 LAB
ALBUMIN SERPL-MCNC: 4.6 G/DL (ref 3.6–5.1)
ALBUMIN/CREAT UR: 21 MCG/MG CREAT
ALBUMIN/GLOB SERPL: 2 (CALC) (ref 1–2.5)
ALP SERPL-CCNC: 67 U/L (ref 37–153)
ALT SERPL-CCNC: 27 U/L (ref 6–29)
APPEARANCE UR: CLEAR
AST SERPL-CCNC: 22 U/L (ref 10–35)
BACTERIA #/AREA URNS HPF: ABNORMAL /HPF
BACTERIA UR CULT: ABNORMAL
BACTERIA UR CULT: ABNORMAL
BASOPHILS # BLD AUTO: 59 CELLS/UL (ref 0–200)
BASOPHILS NFR BLD AUTO: 1.4 %
BILIRUB SERPL-MCNC: 0.8 MG/DL (ref 0.2–1.2)
BILIRUB UR QL STRIP: NEGATIVE
BUN SERPL-MCNC: 25 MG/DL (ref 7–25)
BUN/CREAT SERPL: 23 (CALC) (ref 6–22)
CALCIUM SERPL-MCNC: 10.3 MG/DL (ref 8.6–10.4)
CHLORIDE SERPL-SCNC: 102 MMOL/L (ref 98–110)
CHOLEST SERPL-MCNC: 203 MG/DL
CHOLEST/HDLC SERPL: 4.1 (CALC)
CO2 SERPL-SCNC: 28 MMOL/L (ref 20–32)
COLOR UR: YELLOW
CREAT SERPL-MCNC: 1.08 MG/DL (ref 0.6–1)
CREAT UR-MCNC: 29 MG/DL (ref 20–275)
EGFR: 55 ML/MIN/1.73M2
EOSINOPHIL # BLD AUTO: 218 CELLS/UL (ref 15–500)
EOSINOPHIL NFR BLD AUTO: 5.2 %
ERYTHROCYTE [DISTWIDTH] IN BLOOD BY AUTOMATED COUNT: 12.8 % (ref 11–15)
GLOBULIN SER CALC-MCNC: 2.3 G/DL (CALC) (ref 1.9–3.7)
GLUCOSE SERPL-MCNC: 107 MG/DL (ref 65–99)
GLUCOSE UR QL STRIP: NEGATIVE
HCT VFR BLD AUTO: 34 % (ref 35–45)
HDLC SERPL-MCNC: 50 MG/DL
HGB BLD-MCNC: 11.2 G/DL (ref 11.7–15.5)
HGB UR QL STRIP: NEGATIVE
HYALINE CASTS #/AREA URNS LPF: ABNORMAL /LPF
KETONES UR QL STRIP: NEGATIVE
LDLC SERPL CALC-MCNC: 114 MG/DL (CALC)
LEUKOCYTE ESTERASE UR QL STRIP: ABNORMAL
LYMPHOCYTES # BLD AUTO: 1130 CELLS/UL (ref 850–3900)
LYMPHOCYTES NFR BLD AUTO: 26.9 %
MCH RBC QN AUTO: 30.2 PG (ref 27–33)
MCHC RBC AUTO-ENTMCNC: 32.9 G/DL (ref 32–36)
MCV RBC AUTO: 91.6 FL (ref 80–100)
MICROALBUMIN UR-MCNC: 0.6 MG/DL
MONOCYTES # BLD AUTO: 487 CELLS/UL (ref 200–950)
MONOCYTES NFR BLD AUTO: 11.6 %
NEUTROPHILS # BLD AUTO: 2306 CELLS/UL (ref 1500–7800)
NEUTROPHILS NFR BLD AUTO: 54.9 %
NITRITE UR QL STRIP: NEGATIVE
NONHDLC SERPL-MCNC: 153 MG/DL (CALC)
PH UR STRIP: 8 [PH] (ref 5–8)
PLATELET # BLD AUTO: 147 THOUSAND/UL (ref 140–400)
PMV BLD REES-ECKER: 10.8 FL (ref 7.5–12.5)
POTASSIUM SERPL-SCNC: 4.4 MMOL/L (ref 3.5–5.3)
PROT SERPL-MCNC: 6.9 G/DL (ref 6.1–8.1)
PROT UR QL STRIP: NEGATIVE
RBC # BLD AUTO: 3.71 MILLION/UL (ref 3.8–5.1)
RBC #/AREA URNS HPF: ABNORMAL /HPF
SERVICE CMNT-IMP: ABNORMAL
SODIUM SERPL-SCNC: 140 MMOL/L (ref 135–146)
SP GR UR STRIP: 1.01 (ref 1–1.03)
SQUAMOUS #/AREA URNS HPF: ABNORMAL /HPF
TRIGL SERPL-MCNC: 273 MG/DL
TSH SERPL-ACNC: 0.63 MIU/L (ref 0.4–4.5)
WBC # BLD AUTO: 4.2 THOUSAND/UL (ref 3.8–10.8)
WBC #/AREA URNS HPF: ABNORMAL /HPF

## 2024-01-16 ENCOUNTER — TELEPHONE (OUTPATIENT)
Dept: FAMILY MEDICINE | Facility: CLINIC | Age: 74
End: 2024-01-16
Payer: MEDICARE

## 2024-01-16 NOTE — TELEPHONE ENCOUNTER
Kidney function is slightly decreased. Liver enzymes have returned to normal. Need to work on getting sugar controlled. Continue to stay hydrated. We will monitor for now.

## 2024-01-17 ENCOUNTER — PATIENT MESSAGE (OUTPATIENT)
Dept: ADMINISTRATIVE | Facility: HOSPITAL | Age: 74
End: 2024-01-17
Payer: MEDICARE

## 2024-01-30 ENCOUNTER — TELEPHONE (OUTPATIENT)
Dept: FAMILY MEDICINE | Facility: CLINIC | Age: 74
End: 2024-01-30
Payer: MEDICARE

## 2024-01-30 NOTE — TELEPHONE ENCOUNTER
[facility] requesting additional code for urine culture. Added R82.90 to billing trailer.    Cephalexin Counseling: I counseled the patient regarding use of cephalexin as an antibiotic for prophylactic and/or therapeutic purposes. Cephalexin (commonly prescribed under brand name Keflex) is a cephalosporin antibiotic which is active against numerous classes of bacteria, including most skin bacteria. Side effects may include nausea, diarrhea, gastrointestinal upset, rash, hives, yeast infections, and in rare cases, hepatitis, kidney disease, seizures, fever, confusion, neurologic symptoms, and others. Patients with severe allergies to penicillin medications are cautioned that there is about a 10% incidence of cross-reactivity with cephalosporins. When possible, patients with penicillin allergies should use alternatives to cephalosporins for antibiotic therapy.

## 2024-02-12 ENCOUNTER — TELEPHONE (OUTPATIENT)
Dept: FAMILY MEDICINE | Facility: CLINIC | Age: 74
End: 2024-02-12
Payer: MEDICARE

## 2024-02-12 VITALS
OXYGEN SATURATION: 99 % | BODY MASS INDEX: 26.13 KG/M2 | WEIGHT: 142 LBS | HEIGHT: 62 IN | DIASTOLIC BLOOD PRESSURE: 60 MMHG | HEART RATE: 75 BPM | SYSTOLIC BLOOD PRESSURE: 130 MMHG

## 2024-02-12 RX ORDER — AZITHROMYCIN 250 MG/1
TABLET, FILM COATED ORAL
Qty: 6 TABLET | Refills: 0 | Status: SHIPPED | OUTPATIENT
Start: 2024-02-12 | End: 2024-02-17

## 2024-02-12 NOTE — TELEPHONE ENCOUNTER
----- Message from Karolina Ramirez NP sent at 2/12/2024 12:07 AM CST -----  Cholesterol has increased . Start diet and exercise. This will help cholesterol as well as sugar. Rest of labs are stable.

## 2024-02-12 NOTE — TELEPHONE ENCOUNTER
Spoke with pt, pt c/o sinus/nasal congestion and sore throat x 7 days. Denies fever or chills. Pt stated that she has taken coricidin cold and flu, with no relief with symptoms. Pt would like for something to be call into her pharmacy.

## 2024-02-12 NOTE — TELEPHONE ENCOUNTER
Spoke with pt in regards to recent lab results. Verbalized per Karolina that pt's cholesterol level has increased. Karolina recommends you start a low fat diet and exercise. This will help cholesterol as well as sugar. Rest of labs are stable. Pt acknowledged understanding.

## 2024-02-19 DIAGNOSIS — I10 ESSENTIAL HYPERTENSION: ICD-10-CM

## 2024-02-19 RX ORDER — LOSARTAN POTASSIUM 100 MG/1
100 TABLET ORAL DAILY
Qty: 90 TABLET | Refills: 0 | Status: SHIPPED | OUTPATIENT
Start: 2024-02-19 | End: 2024-04-25 | Stop reason: SDUPTHER

## 2024-02-19 NOTE — TELEPHONE ENCOUNTER
----- Message from Azul Vivas sent at 2/19/2024 10:33 AM CST -----  Pt needs refill on losartan   [facility] [address]   [phone]

## 2024-02-20 ENCOUNTER — TELEPHONE (OUTPATIENT)
Dept: FAMILY MEDICINE | Facility: CLINIC | Age: 74
End: 2024-02-20
Payer: MEDICARE

## 2024-02-20 NOTE — TELEPHONE ENCOUNTER
Spoke to pt and she stated she had cough, and congestions. And eva sent in a z pack and she took it all. Pt stated when she blows her nose she has yellow mucus/green. This has been going on for 3 weeks.     Come in tomorrow?

## 2024-02-20 NOTE — TELEPHONE ENCOUNTER
----- Message from Olga Kyle sent at 2/20/2024 10:40 AM CST -----  The patient said she took all the antibiotics Karolina gave her. She is still having blowing her nose sometimes its yellow and thick. Should she be on more antibiotics? [facility] in [place] [address] [place]. PT;S # [phone] GH

## 2024-02-21 ENCOUNTER — OFFICE VISIT (OUTPATIENT)
Dept: FAMILY MEDICINE | Facility: CLINIC | Age: 74
End: 2024-02-21
Payer: MEDICARE

## 2024-02-21 VITALS
SYSTOLIC BLOOD PRESSURE: 138 MMHG | BODY MASS INDEX: 26.31 KG/M2 | DIASTOLIC BLOOD PRESSURE: 78 MMHG | HEIGHT: 62 IN | WEIGHT: 143 LBS | HEART RATE: 82 BPM | OXYGEN SATURATION: 99 %

## 2024-02-21 DIAGNOSIS — F41.9 ANXIETY: ICD-10-CM

## 2024-02-21 DIAGNOSIS — M15.9 PRIMARY OSTEOARTHRITIS INVOLVING MULTIPLE JOINTS: ICD-10-CM

## 2024-02-21 DIAGNOSIS — J32.9 SINUSITIS, UNSPECIFIED CHRONICITY, UNSPECIFIED LOCATION: ICD-10-CM

## 2024-02-21 DIAGNOSIS — E11.65 UNCONTROLLED TYPE 2 DIABETES MELLITUS WITH HYPERGLYCEMIA: Primary | ICD-10-CM

## 2024-02-21 DIAGNOSIS — I10 ESSENTIAL HYPERTENSION: ICD-10-CM

## 2024-02-21 DIAGNOSIS — E78.5 HYPERLIPIDEMIA, UNSPECIFIED HYPERLIPIDEMIA TYPE: ICD-10-CM

## 2024-02-21 PROCEDURE — 3008F BODY MASS INDEX DOCD: CPT | Mod: CPTII,S$GLB,, | Performed by: NURSE PRACTITIONER

## 2024-02-21 PROCEDURE — 1159F MED LIST DOCD IN RCRD: CPT | Mod: CPTII,S$GLB,, | Performed by: NURSE PRACTITIONER

## 2024-02-21 PROCEDURE — 3061F NEG MICROALBUMINURIA REV: CPT | Mod: CPTII,S$GLB,, | Performed by: NURSE PRACTITIONER

## 2024-02-21 PROCEDURE — 3066F NEPHROPATHY DOC TX: CPT | Mod: CPTII,S$GLB,, | Performed by: NURSE PRACTITIONER

## 2024-02-21 PROCEDURE — 99214 OFFICE O/P EST MOD 30 MIN: CPT | Mod: S$GLB,,, | Performed by: NURSE PRACTITIONER

## 2024-02-21 PROCEDURE — 1101F PT FALLS ASSESS-DOCD LE1/YR: CPT | Mod: CPTII,S$GLB,, | Performed by: NURSE PRACTITIONER

## 2024-02-21 PROCEDURE — 1160F RVW MEDS BY RX/DR IN RCRD: CPT | Mod: CPTII,S$GLB,, | Performed by: NURSE PRACTITIONER

## 2024-02-21 PROCEDURE — 3288F FALL RISK ASSESSMENT DOCD: CPT | Mod: CPTII,S$GLB,, | Performed by: NURSE PRACTITIONER

## 2024-02-21 PROCEDURE — 3051F HG A1C>EQUAL 7.0%<8.0%: CPT | Mod: CPTII,S$GLB,, | Performed by: NURSE PRACTITIONER

## 2024-02-21 PROCEDURE — 3075F SYST BP GE 130 - 139MM HG: CPT | Mod: CPTII,S$GLB,, | Performed by: NURSE PRACTITIONER

## 2024-02-21 PROCEDURE — 4010F ACE/ARB THERAPY RXD/TAKEN: CPT | Mod: CPTII,S$GLB,, | Performed by: NURSE PRACTITIONER

## 2024-02-21 PROCEDURE — 3078F DIAST BP <80 MM HG: CPT | Mod: CPTII,S$GLB,, | Performed by: NURSE PRACTITIONER

## 2024-02-21 RX ORDER — RSV VACC, PREF A AND PREF B/PF 120MCG/0.5
VIAL (EA) INTRAMUSCULAR
COMMUNITY
Start: 2023-12-04 | End: 2024-02-21

## 2024-02-21 RX ORDER — METHYLPREDNISOLONE 4 MG/1
TABLET ORAL
Qty: 21 EACH | Refills: 0 | Status: SHIPPED | OUTPATIENT
Start: 2024-02-21 | End: 2024-02-27

## 2024-02-21 RX ORDER — GLIPIZIDE 5 MG/1
5 TABLET ORAL
Qty: 30 TABLET | Refills: 2 | Status: SHIPPED | OUTPATIENT
Start: 2024-02-21 | End: 2024-04-25 | Stop reason: SDUPTHER

## 2024-02-21 RX ORDER — DOXYCYCLINE 100 MG/1
100 CAPSULE ORAL 2 TIMES DAILY
Qty: 20 CAPSULE | Refills: 0 | Status: SHIPPED | OUTPATIENT
Start: 2024-02-21 | End: 2024-02-27

## 2024-02-21 RX ORDER — MELOXICAM 15 MG/1
15 TABLET ORAL DAILY
Qty: 90 TABLET | Refills: 1 | Status: SHIPPED | OUTPATIENT
Start: 2024-02-21

## 2024-02-22 NOTE — PROGRESS NOTES
"  SUBJECTIVE:    Patient ID: Kay Valencia is a 73 y.o. female.    Chief Complaint: Follow-up (No bottles// pt states shes been having runny nose thick bloody "old blood" mucus sometimes yellow for 3 weeks// was sent in a z pack and she took it all /)    73-year-old female presents for urgent visit. She is treated for diabetes, htn, oa, anxiety, insomnia, she is taking medications as prescribed. Today reports still not feeling better. Reports symptoms started 2/12. Was treated with zpack and took otc meds. Initially did start to feel better and then symptoms started up again. No fever. Yellowish sputum . No sob. No sick contacts.   Sugars are still elevated.     Follow-up  Associated symptoms include congestion and coughing. Pertinent negatives include no chest pain, chills, fever or sore throat.       Office Visit on 01/11/2024   Component Date Value Ref Range Status    Hemoglobin A1C, POC 01/11/2024 7.0  % Final    WBC 01/11/2024 4.2  3.8 - 10.8 Thousand/uL Final    RBC 01/11/2024 3.71 (L)  3.80 - 5.10 Million/uL Final    Hemoglobin 01/11/2024 11.2 (L)  11.7 - 15.5 g/dL Final    Hematocrit 01/11/2024 34.0 (L)  35.0 - 45.0 % Final    MCV 01/11/2024 91.6  80.0 - 100.0 fL Final    MCH 01/11/2024 30.2  27.0 - 33.0 pg Final    MCHC 01/11/2024 32.9  32.0 - 36.0 g/dL Final    RDW 01/11/2024 12.8  11.0 - 15.0 % Final    Platelets 01/11/2024 147  140 - 400 Thousand/uL Final    MPV 01/11/2024 10.8  7.5 - 12.5 fL Final    Neutrophils, Abs 01/11/2024 2,306  1,500 - 7,800 cells/uL Final    Lymph # 01/11/2024 1,130  850 - 3,900 cells/uL Final    Mono # 01/11/2024 487  200 - 950 cells/uL Final    Eos # 01/11/2024 218  15 - 500 cells/uL Final    Baso # 01/11/2024 59  0 - 200 cells/uL Final    Neutrophils Relative 01/11/2024 54.9  % Final    Lymph % 01/11/2024 26.9  % Final    Mono % 01/11/2024 11.6  % Final    Eosinophil % 01/11/2024 5.2  % Final    Basophil % 01/11/2024 1.4  % Final    Glucose 01/11/2024 107 (H)  65 - 99 mg/dL " Final    BUN 01/11/2024 25  7 - 25 mg/dL Final    Creatinine 01/11/2024 1.08 (H)  0.60 - 1.00 mg/dL Final    eGFR 01/11/2024 55 (L)  > OR = 60 mL/min/1.73m2 Final    BUN/Creatinine Ratio 01/11/2024 23 (H)  6 - 22 (calc) Final    Sodium 01/11/2024 140  135 - 146 mmol/L Final    Potassium 01/11/2024 4.4  3.5 - 5.3 mmol/L Final    Chloride 01/11/2024 102  98 - 110 mmol/L Final    CO2 01/11/2024 28  20 - 32 mmol/L Final    Calcium 01/11/2024 10.3  8.6 - 10.4 mg/dL Final    Total Protein 01/11/2024 6.9  6.1 - 8.1 g/dL Final    Albumin 01/11/2024 4.6  3.6 - 5.1 g/dL Final    Globulin, Total 01/11/2024 2.3  1.9 - 3.7 g/dL (calc) Final    Albumin/Globulin Ratio 01/11/2024 2.0  1.0 - 2.5 (calc) Final    Total Bilirubin 01/11/2024 0.8  0.2 - 1.2 mg/dL Final    Alkaline Phosphatase 01/11/2024 67  37 - 153 U/L Final    AST 01/11/2024 22  10 - 35 U/L Final    ALT 01/11/2024 27  6 - 29 U/L Final    Cholesterol 01/11/2024 203 (H)  <200 mg/dL Final    HDL 01/11/2024 50  > OR = 50 mg/dL Final    Triglycerides 01/11/2024 273 (H)  <150 mg/dL Final    LDL Cholesterol 01/11/2024 114 (H)  mg/dL (calc) Final    HDL/Cholesterol Ratio 01/11/2024 4.1  <5.0 (calc) Final    Non HDL Chol. (LDL+VLDL) 01/11/2024 153 (H)  <130 mg/dL (calc) Final    TSH w/reflex to FT4 01/11/2024 0.63  0.40 - 4.50 mIU/L Final    Creatinine, Urine 01/11/2024 29  20 - 275 mg/dL Final    Microalb, Ur 01/11/2024 0.6  See Note: mg/dL Final    Microalb/Creat Ratio 01/11/2024 21  <30 mcg/mg creat Final    Color, UA 01/11/2024 YELLOW  YELLOW Final    Appearance, UA 01/11/2024 CLEAR  CLEAR Final    Specific Gravity, UA 01/11/2024 1.008  1.001 - 1.035 Final    pH, UA 01/11/2024 8.0  5.0 - 8.0 Final    Glucose, UA 01/11/2024 NEGATIVE  NEGATIVE Final    Bilirubin, UA 01/11/2024 NEGATIVE  NEGATIVE Final    Ketones, UA 01/11/2024 NEGATIVE  NEGATIVE Final    Occult Blood UA 01/11/2024 NEGATIVE  NEGATIVE Final    Protein, UA 01/11/2024 NEGATIVE  NEGATIVE Final    Nitrite, UA  01/11/2024 NEGATIVE  NEGATIVE Final    Leukocytes, UA 01/11/2024 1+ (A)  NEGATIVE Final    WBC Casts, UA 01/11/2024 NONE SEEN  < OR = 5 /HPF Final    RBC Casts, UA 01/11/2024 NONE SEEN  < OR = 2 /HPF Final    Squam Epithel, UA 01/11/2024 0-5  < OR = 5 /HPF Final    Bacteria, UA 01/11/2024 NONE SEEN  NONE SEEN /HPF Final    Hyaline Casts, UA 01/11/2024 NONE SEEN  NONE SEEN /LPF Final    Service Cmt: 01/11/2024    Final    Reflexive Urine Culture 01/11/2024    Final    Urine Culture, Routine 01/11/2024    Final   Office Visit on 10/04/2023   Component Date Value Ref Range Status    Hemoglobin A1C, POC 10/04/2023 6.4  % Final       Past Medical History:   Diagnosis Date    Anxiety     Depression     Diabetes mellitus, type 2     GERD (gastroesophageal reflux disease)     Hyperlipidemia     Hypertension      Social History     Socioeconomic History    Marital status:    Tobacco Use    Smoking status: Former    Smokeless tobacco: Never   Substance and Sexual Activity    Alcohol use: No    Drug use: No    Sexual activity: Yes     Social Determinants of Health     Financial Resource Strain: Low Risk  (9/8/2020)    Overall Financial Resource Strain (CARDIA)     Difficulty of Paying Living Expenses: Not very hard   Transportation Needs: No Transportation Needs (9/8/2020)    PRAPARE - Transportation     Lack of Transportation (Medical): No     Lack of Transportation (Non-Medical): No   Stress: Stress Concern Present (9/8/2020)    [employer] [employer] - Occupational Stress Questionnaire     Feeling of Stress : To some extent   Social Connections: Unknown (9/8/2020)    Social Connection and Isolation Panel [NHANES]     Frequency of Communication with Friends and Family: More than three times a week     Frequency of Social Gatherings with Friends and Family: Twice a week     Active Member of Clubs or Organizations: No     Attends Club or Organization Meetings: Never     Marital Status:      Past  Surgical History:   Procedure Laterality Date    BREAST BIOPSY Left 1990'S    BENIGN    HYSTERECTOMY      INCONTINENCE SURGERY       Family History   Problem Relation Age of Onset    Diabetes Maternal Grandmother     Hypertension Maternal Grandmother     Diabetes Father     Breast cancer Mother 57    Hypertension Mother     Diabetes Sister     Ovarian cancer Neg Hx        Tests to Keep You Healthy    Mammogram: Met on 11/30/2023  Eye Exam: Met on 8/22/2023  Colon Cancer Screening: Met on 9/21/2017  Last Blood Pressure <= 139/89 (2/21/2024): NO  Last HbA1c < 8 (01/11/2024): Yes      Review of patient's allergies indicates:   Allergen Reactions    Flexeril [cyclobenzaprine] Other (See Comments)     Unknown was a long time ago.    Keflex [cephalexin] Diarrhea    Sulfa (sulfonamide antibiotics) Nausea Only    Augmentin [amoxicillin-pot clavulanate] Other (See Comments)     Gas       Current Outpatient Medications:     ALPRAZolam (XANAX) 1 MG tablet, Take 1 tablet (1 mg total) by mouth every evening., Disp: 30 tablet, Rfl: 2    atorvastatin (LIPITOR) 40 MG tablet, Take 1.5 tablets (60 mg total) by mouth once daily., Disp: 135 tablet, Rfl: 3    blood sugar diagnostic Strp, To check BG 1 times daily, to use with insurance preferred meter, Disp: 200 strip, Rfl: 1    blood-glucose meter kit, To check BG 1 times daily, to use with insurance preferred meter, Disp: 1 each, Rfl: 0    busPIRone (BUSPAR) 10 MG tablet, Take 1 tablet (10 mg total) by mouth 3 (three) times daily., Disp: 90 tablet, Rfl: 11    calcium carbonate/vitamin D3 (CALTRATE 600 PLUS D ORAL), Caltrate 600 plus D, Disp: , Rfl:     famotidine (PEPCID) 40 MG tablet, Take 1 tablet (40 mg total) by mouth once daily., Disp: 30 tablet, Rfl: 11    hydrALAZINE (APRESOLINE) 25 MG tablet, Take 1 tablet (25 mg total) by mouth 2 (two) times a day., Disp: 60 tablet, Rfl: 5    hydroCHLOROthiazide (HYDRODIURIL) 12.5 MG Tab, Take 2 tablets (25 mg total) by mouth once daily.,  Disp: 180 tablet, Rfl: 1    HYDROcodone-acetaminophen (NORCO)  mg per tablet, Take 1 tablet by mouth every 6 (six) hours as needed for Pain., Disp: , Rfl:     lancets 33 gauge Misc, 1 lancet by Misc.(Non-Drug; Combo Route) route 3 (three) times a week., Disp: 100 each, Rfl: 3    lancets Misc, To check BG 1 times daily, to use with insurance preferred meter, Disp: 200 each, Rfl: 1    losartan (COZAAR) 100 MG tablet, Take 1 tablet (100 mg total) by mouth once daily., Disp: 90 tablet, Rfl: 0    melatonin 5 mg Cap, Take 10 mg by mouth., Disp: , Rfl:     metFORMIN (GLUCOPHAGE) 500 MG tablet, Take 1 tablet (500 mg total) by mouth 2 (two) times daily., Disp: 180 tablet, Rfl: 1    omega 3-dha-epa-fish oil 100-160-1,000 mg Cap, Fish Oil, Disp: , Rfl:     pantoprazole (PROTONIX) 40 MG tablet, Take 1 tablet (40 mg total) by mouth once daily., Disp: 30 tablet, Rfl: 11    potassium chloride (MICRO-K) 10 MEQ CpSR, Take 1 capsule (10 mEq total) by mouth once daily., Disp: 90 capsule, Rfl: 1    doxycycline (MONODOX) 100 MG capsule, Take 1 capsule (100 mg total) by mouth 2 (two) times daily., Disp: 20 capsule, Rfl: 0    glipiZIDE (GLUCOTROL) 5 MG tablet, Take 1 tablet (5 mg total) by mouth daily with breakfast., Disp: 30 tablet, Rfl: 2    Lactobacillus rhamnosus GG (CULTURELLE) 10 billion cell capsule, Take 1 capsule by mouth once daily., Disp: , Rfl:     meloxicam (MOBIC) 15 MG tablet, Take 1 tablet (15 mg total) by mouth once daily., Disp: 90 tablet, Rfl: 1    methylPREDNISolone (MEDROL DOSEPACK) 4 mg tablet, use as directed, Disp: 21 each, Rfl: 0    Review of Systems   Constitutional:  Negative for chills and fever.   HENT:  Positive for congestion and sinus pressure. Negative for ear discharge, ear pain and sore throat.    Eyes:  Negative for discharge.   Respiratory:  Positive for cough. Negative for shortness of breath.    Cardiovascular:  Negative for chest pain and leg swelling.          Objective:      Vitals:     "02/21/24 1353 02/21/24 1355   BP: (!) 162/81 138/78   Pulse: 82    SpO2: 99%    Weight: 64.9 kg (143 lb)    Height: 5' 2" (1.575 m)      Physical Exam  Vitals and nursing note reviewed.   Constitutional:       General: She is not in acute distress.     Appearance: Normal appearance. She is well-developed. She is not ill-appearing.   HENT:      Right Ear: External ear normal.      Left Ear: External ear normal.      Nose: Congestion and rhinorrhea present.      Right Sinus: Frontal sinus tenderness present.      Left Sinus: Frontal sinus tenderness present.      Mouth/Throat:      Pharynx: Posterior oropharyngeal erythema present.      Tonsils: No tonsillar exudate.   Cardiovascular:      Rate and Rhythm: Normal rate and regular rhythm.      Heart sounds: Normal heart sounds.   Pulmonary:      Breath sounds: Normal breath sounds.   Lymphadenopathy:      Cervical: No cervical adenopathy.   Neurological:      Mental Status: She is alert.           Assessment:       1. Uncontrolled type 2 diabetes mellitus with hyperglycemia    2. Anxiety    3. Hyperlipidemia, unspecified hyperlipidemia type    4. Essential hypertension    5. Sinusitis, unspecified chronicity, unspecified location    6. Primary osteoarthritis involving multiple joints         Plan:       Uncontrolled type 2 diabetes mellitus with hyperglycemia  Comments:  add glipizide in am    Anxiety  Comments:  xanax at night    Hyperlipidemia, unspecified hyperlipidemia type  Comments:  lipitor    Essential hypertension  Comments:  bp ok    Sinusitis, unspecified chronicity, unspecified location  Comments:  doxycycline. medrol. monitor sugar closely    Primary osteoarthritis involving multiple joints  Comments:  mobic prn    Other orders  -     glipiZIDE (GLUCOTROL) 5 MG tablet; Take 1 tablet (5 mg total) by mouth daily with breakfast.  Dispense: 30 tablet; Refill: 2  -     doxycycline (MONODOX) 100 MG capsule; Take 1 capsule (100 mg total) by mouth 2 (two) times " daily.  Dispense: 20 capsule; Refill: 0  -     meloxicam (MOBIC) 15 MG tablet; Take 1 tablet (15 mg total) by mouth once daily.  Dispense: 90 tablet; Refill: 1  -     methylPREDNISolone (MEDROL DOSEPACK) 4 mg tablet; use as directed  Dispense: 21 each; Refill: 0      Follow up in about 3 months (around 5/21/2024), or if symptoms worsen or fail to improve, for medication management, Diabetic Check-Up.        2/21/2024 Karolina Ramirez

## 2024-02-26 ENCOUNTER — TELEPHONE (OUTPATIENT)
Dept: FAMILY MEDICINE | Facility: CLINIC | Age: 74
End: 2024-02-26
Payer: MEDICARE

## 2024-02-26 NOTE — TELEPHONE ENCOUNTER
Spoke to pt and she stated she hd an allergic reaction to Medrol dose pack or doxycyline because she took both and it caused her face to be red and hot has since went away pt has not take either medications since .     Pt stated the glipizide made her feel terrible and it did not help her sugar because her sugar has been in the 200's fasting       [facility] [address] [place]

## 2024-02-26 NOTE — TELEPHONE ENCOUNTER
Spoke to pt and she is going to take a xanax and see how Bp is doing because she is stressed out. Pt stated BS has been 200s in the morning and running around 150/160's during the day

## 2024-02-26 NOTE — TELEPHONE ENCOUNTER
----- Message from Olga Kyle sent at 2/26/2024  2:18 PM CST -----  The patient wants to speak to Sharona. She spoke to her this morning. Her B/P is 170/97. Please call pt's # [phone] GH

## 2024-02-26 NOTE — TELEPHONE ENCOUNTER
----- Message from Barbara Diaz sent at 2/26/2024  8:38 AM CST -----  Pt is having an allergic reaction to the two medicines that she was prescribed. Pt took 1/2 of the diabetic medicine and that made her feel bad as well. Please advise. Pt #[phone]

## 2024-02-27 ENCOUNTER — OFFICE VISIT (OUTPATIENT)
Dept: FAMILY MEDICINE | Facility: CLINIC | Age: 74
End: 2024-02-27
Payer: MEDICARE

## 2024-02-27 VITALS
WEIGHT: 139 LBS | BODY MASS INDEX: 25.58 KG/M2 | DIASTOLIC BLOOD PRESSURE: 70 MMHG | HEART RATE: 82 BPM | SYSTOLIC BLOOD PRESSURE: 126 MMHG | OXYGEN SATURATION: 100 % | HEIGHT: 62 IN

## 2024-02-27 DIAGNOSIS — K21.9 GASTROESOPHAGEAL REFLUX DISEASE, UNSPECIFIED WHETHER ESOPHAGITIS PRESENT: ICD-10-CM

## 2024-02-27 DIAGNOSIS — F41.9 ANXIETY: ICD-10-CM

## 2024-02-27 DIAGNOSIS — I10 PRIMARY HYPERTENSION: Primary | ICD-10-CM

## 2024-02-27 DIAGNOSIS — Z00.00 ENCOUNTER FOR MEDICARE ANNUAL WELLNESS EXAM: ICD-10-CM

## 2024-02-27 DIAGNOSIS — J32.9 SINUSITIS, UNSPECIFIED CHRONICITY, UNSPECIFIED LOCATION: ICD-10-CM

## 2024-02-27 DIAGNOSIS — E11.65 UNCONTROLLED TYPE 2 DIABETES MELLITUS WITH HYPERGLYCEMIA: ICD-10-CM

## 2024-02-27 PROCEDURE — 3008F BODY MASS INDEX DOCD: CPT | Mod: CPTII,S$GLB,, | Performed by: NURSE PRACTITIONER

## 2024-02-27 PROCEDURE — 3051F HG A1C>EQUAL 7.0%<8.0%: CPT | Mod: CPTII,S$GLB,, | Performed by: NURSE PRACTITIONER

## 2024-02-27 PROCEDURE — 1159F MED LIST DOCD IN RCRD: CPT | Mod: CPTII,S$GLB,, | Performed by: NURSE PRACTITIONER

## 2024-02-27 PROCEDURE — 3066F NEPHROPATHY DOC TX: CPT | Mod: CPTII,S$GLB,, | Performed by: NURSE PRACTITIONER

## 2024-02-27 PROCEDURE — 3061F NEG MICROALBUMINURIA REV: CPT | Mod: CPTII,S$GLB,, | Performed by: NURSE PRACTITIONER

## 2024-02-27 PROCEDURE — 4010F ACE/ARB THERAPY RXD/TAKEN: CPT | Mod: CPTII,S$GLB,, | Performed by: NURSE PRACTITIONER

## 2024-02-27 PROCEDURE — 3288F FALL RISK ASSESSMENT DOCD: CPT | Mod: CPTII,S$GLB,, | Performed by: NURSE PRACTITIONER

## 2024-02-27 PROCEDURE — 1101F PT FALLS ASSESS-DOCD LE1/YR: CPT | Mod: CPTII,S$GLB,, | Performed by: NURSE PRACTITIONER

## 2024-02-27 PROCEDURE — 3078F DIAST BP <80 MM HG: CPT | Mod: CPTII,S$GLB,, | Performed by: NURSE PRACTITIONER

## 2024-02-27 PROCEDURE — 99214 OFFICE O/P EST MOD 30 MIN: CPT | Mod: S$GLB,,, | Performed by: NURSE PRACTITIONER

## 2024-02-27 PROCEDURE — 3074F SYST BP LT 130 MM HG: CPT | Mod: CPTII,S$GLB,, | Performed by: NURSE PRACTITIONER

## 2024-02-27 NOTE — PROGRESS NOTES
SUBJECTIVE:    Patient ID: Kay Valencia is a 73 y.o. female.    Chief Complaint: Follow-up (No bottles//Pt here for elevated BP and panic attacks, get pain in the back of her neck. Takes xanax and feels better after an hour. Took BP yesterday and it was 170/97. Took xannax and rechecked, it was 160/85//Pt also here for doxycycline and medrol dose pack reaction. Pt still c/o sinus congestion//JL)    73-year-old female presents for urgent visit. She is treated for diabetes, htn, oa, anxiety, insomnia, she is taking medications as prescribed. Today reports still not feeling better. Reports symptoms started 2/12. Was treated with doxycycline and medrol. Quit taking both because was having side effects. Reports facial flushing etc. Felt hot. Not sure which one was causing symptoms. Feels anxious. Still congested. Did not try buspar. Not sleeping because of cough. No fever    Follow-up  Associated symptoms include congestion and coughing. Pertinent negatives include no chest pain, chills, fever or sore throat.       Office Visit on 01/11/2024   Component Date Value Ref Range Status    Hemoglobin A1C, POC 01/11/2024 7.0  % Final    WBC 01/11/2024 4.2  3.8 - 10.8 Thousand/uL Final    RBC 01/11/2024 3.71 (L)  3.80 - 5.10 Million/uL Final    Hemoglobin 01/11/2024 11.2 (L)  11.7 - 15.5 g/dL Final    Hematocrit 01/11/2024 34.0 (L)  35.0 - 45.0 % Final    MCV 01/11/2024 91.6  80.0 - 100.0 fL Final    MCH 01/11/2024 30.2  27.0 - 33.0 pg Final    MCHC 01/11/2024 32.9  32.0 - 36.0 g/dL Final    RDW 01/11/2024 12.8  11.0 - 15.0 % Final    Platelets 01/11/2024 147  140 - 400 Thousand/uL Final    MPV 01/11/2024 10.8  7.5 - 12.5 fL Final    Neutrophils, Abs 01/11/2024 2,306  1,500 - 7,800 cells/uL Final    Lymph # 01/11/2024 1,130  850 - 3,900 cells/uL Final    Mono # 01/11/2024 487  200 - 950 cells/uL Final    Eos # 01/11/2024 218  15 - 500 cells/uL Final    Baso # 01/11/2024 59  0 - 200 cells/uL Final    Neutrophils Relative  01/11/2024 54.9  % Final    Lymph % 01/11/2024 26.9  % Final    Mono % 01/11/2024 11.6  % Final    Eosinophil % 01/11/2024 5.2  % Final    Basophil % 01/11/2024 1.4  % Final    Glucose 01/11/2024 107 (H)  65 - 99 mg/dL Final    BUN 01/11/2024 25  7 - 25 mg/dL Final    Creatinine 01/11/2024 1.08 (H)  0.60 - 1.00 mg/dL Final    eGFR 01/11/2024 55 (L)  > OR = 60 mL/min/1.73m2 Final    BUN/Creatinine Ratio 01/11/2024 23 (H)  6 - 22 (calc) Final    Sodium 01/11/2024 140  135 - 146 mmol/L Final    Potassium 01/11/2024 4.4  3.5 - 5.3 mmol/L Final    Chloride 01/11/2024 102  98 - 110 mmol/L Final    CO2 01/11/2024 28  20 - 32 mmol/L Final    Calcium 01/11/2024 10.3  8.6 - 10.4 mg/dL Final    Total Protein 01/11/2024 6.9  6.1 - 8.1 g/dL Final    Albumin 01/11/2024 4.6  3.6 - 5.1 g/dL Final    Globulin, Total 01/11/2024 2.3  1.9 - 3.7 g/dL (calc) Final    Albumin/Globulin Ratio 01/11/2024 2.0  1.0 - 2.5 (calc) Final    Total Bilirubin 01/11/2024 0.8  0.2 - 1.2 mg/dL Final    Alkaline Phosphatase 01/11/2024 67  37 - 153 U/L Final    AST 01/11/2024 22  10 - 35 U/L Final    ALT 01/11/2024 27  6 - 29 U/L Final    Cholesterol 01/11/2024 203 (H)  <200 mg/dL Final    HDL 01/11/2024 50  > OR = 50 mg/dL Final    Triglycerides 01/11/2024 273 (H)  <150 mg/dL Final    LDL Cholesterol 01/11/2024 114 (H)  mg/dL (calc) Final    HDL/Cholesterol Ratio 01/11/2024 4.1  <5.0 (calc) Final    Non HDL Chol. (LDL+VLDL) 01/11/2024 153 (H)  <130 mg/dL (calc) Final    TSH w/reflex to FT4 01/11/2024 0.63  0.40 - 4.50 mIU/L Final    Creatinine, Urine 01/11/2024 29  20 - 275 mg/dL Final    Microalb, Ur 01/11/2024 0.6  See Note: mg/dL Final    Microalb/Creat Ratio 01/11/2024 21  <30 mcg/mg creat Final    Color, UA 01/11/2024 YELLOW  YELLOW Final    Appearance, UA 01/11/2024 CLEAR  CLEAR Final    Specific Gravity, UA 01/11/2024 1.008  1.001 - 1.035 Final    pH, UA 01/11/2024 8.0  5.0 - 8.0 Final    Glucose, UA 01/11/2024 NEGATIVE  NEGATIVE Final     Bilirubin, UA 01/11/2024 NEGATIVE  NEGATIVE Final    Ketones, UA 01/11/2024 NEGATIVE  NEGATIVE Final    Occult Blood UA 01/11/2024 NEGATIVE  NEGATIVE Final    Protein, UA 01/11/2024 NEGATIVE  NEGATIVE Final    Nitrite, UA 01/11/2024 NEGATIVE  NEGATIVE Final    Leukocytes, UA 01/11/2024 1+ (A)  NEGATIVE Final    WBC Casts, UA 01/11/2024 NONE SEEN  < OR = 5 /HPF Final    RBC Casts, UA 01/11/2024 NONE SEEN  < OR = 2 /HPF Final    Squam Epithel, UA 01/11/2024 0-5  < OR = 5 /HPF Final    Bacteria, UA 01/11/2024 NONE SEEN  NONE SEEN /HPF Final    Hyaline Casts, UA 01/11/2024 NONE SEEN  NONE SEEN /LPF Final    Service Cmt: 01/11/2024    Final    Reflexive Urine Culture 01/11/2024    Final    Urine Culture, Routine 01/11/2024    Final   Office Visit on 10/04/2023   Component Date Value Ref Range Status    Hemoglobin A1C, POC 10/04/2023 6.4  % Final       Past Medical History:   Diagnosis Date    Anxiety     Depression     Diabetes mellitus, type 2     GERD (gastroesophageal reflux disease)     Hyperlipidemia     Hypertension      Social History     Socioeconomic History    Marital status:    Tobacco Use    Smoking status: Former    Smokeless tobacco: Never   Substance and Sexual Activity    Alcohol use: No    Drug use: No    Sexual activity: Yes     Social Determinants of Health     Financial Resource Strain: Low Risk  (9/8/2020)    Overall Financial Resource Strain (CARDIA)     Difficulty of Paying Living Expenses: Not very hard   Transportation Needs: No Transportation Needs (9/8/2020)    PRAPARE - Transportation     Lack of Transportation (Medical): No     Lack of Transportation (Non-Medical): No   Stress: Stress Concern Present (9/8/2020)    [employer] - Occupational Stress Questionnaire     Feeling of Stress : To some extent   Social Connections: Unknown (9/8/2020)    Social Connection and Isolation Panel [NHANES]     Frequency of Communication with Friends and Family: More than  three times a week     Frequency of Social Gatherings with Friends and Family: Twice a week     Active Member of Clubs or Organizations: No     Attends Club or Organization Meetings: Never     Marital Status:      Past Surgical History:   Procedure Laterality Date    BREAST BIOPSY Left 1990'S    BENIGN    HYSTERECTOMY      INCONTINENCE SURGERY       Family History   Problem Relation Age of Onset    Diabetes Maternal Grandmother     Hypertension Maternal Grandmother     Diabetes Father     Breast cancer Mother 57    Hypertension Mother     Diabetes Sister     Ovarian cancer Neg Hx        All of your core healthy metrics are met.      Review of patient's allergies indicates:   Allergen Reactions    Flexeril [cyclobenzaprine] Other (See Comments)     Unknown was a long time ago.    Keflex [cephalexin] Diarrhea    Sulfa (sulfonamide antibiotics) Nausea Only    Augmentin [amoxicillin-pot clavulanate] Other (See Comments)     Gas       Current Outpatient Medications:     ALPRAZolam (XANAX) 1 MG tablet, Take 1 tablet (1 mg total) by mouth every evening., Disp: 30 tablet, Rfl: 2    atorvastatin (LIPITOR) 40 MG tablet, Take 1.5 tablets (60 mg total) by mouth once daily., Disp: 135 tablet, Rfl: 3    blood sugar diagnostic Strp, To check BG 1 times daily, to use with insurance preferred meter, Disp: 200 strip, Rfl: 1    blood-glucose meter kit, To check BG 1 times daily, to use with insurance preferred meter, Disp: 1 each, Rfl: 0    busPIRone (BUSPAR) 10 MG tablet, Take 1 tablet (10 mg total) by mouth 3 (three) times daily., Disp: 90 tablet, Rfl: 11    calcium carbonate/vitamin D3 (CALTRATE 600 PLUS D ORAL), Caltrate 600 plus D, Disp: , Rfl:     famotidine (PEPCID) 40 MG tablet, Take 1 tablet (40 mg total) by mouth once daily., Disp: 30 tablet, Rfl: 11    glipiZIDE (GLUCOTROL) 5 MG tablet, Take 1 tablet (5 mg total) by mouth daily with breakfast., Disp: 30 tablet, Rfl: 2    hydrALAZINE (APRESOLINE) 25 MG tablet, Take 1  "tablet (25 mg total) by mouth 2 (two) times a day., Disp: 60 tablet, Rfl: 5    hydroCHLOROthiazide (HYDRODIURIL) 12.5 MG Tab, Take 2 tablets (25 mg total) by mouth once daily., Disp: 180 tablet, Rfl: 1    HYDROcodone-acetaminophen (NORCO)  mg per tablet, Take 1 tablet by mouth every 6 (six) hours as needed for Pain., Disp: , Rfl:     Lactobacillus rhamnosus GG (CULTURELLE) 10 billion cell capsule, Take 1 capsule by mouth once daily., Disp: , Rfl:     lancets 33 gauge Misc, 1 lancet by Misc.(Non-Drug; Combo Route) route 3 (three) times a week., Disp: 100 each, Rfl: 3    lancets Misc, To check BG 1 times daily, to use with insurance preferred meter, Disp: 200 each, Rfl: 1    losartan (COZAAR) 100 MG tablet, Take 1 tablet (100 mg total) by mouth once daily., Disp: 90 tablet, Rfl: 0    melatonin 5 mg Cap, Take 10 mg by mouth., Disp: , Rfl:     meloxicam (MOBIC) 15 MG tablet, Take 1 tablet (15 mg total) by mouth once daily., Disp: 90 tablet, Rfl: 1    metFORMIN (GLUCOPHAGE) 500 MG tablet, Take 1 tablet (500 mg total) by mouth 2 (two) times daily., Disp: 180 tablet, Rfl: 1    omega 3-dha-epa-fish oil 100-160-1,000 mg Cap, Fish Oil, Disp: , Rfl:     pantoprazole (PROTONIX) 40 MG tablet, Take 1 tablet (40 mg total) by mouth once daily., Disp: 30 tablet, Rfl: 11    potassium chloride (MICRO-K) 10 MEQ CpSR, Take 1 capsule (10 mEq total) by mouth once daily., Disp: 90 capsule, Rfl: 1    Review of Systems   Constitutional:  Negative for chills and fever.   HENT:  Positive for congestion, sinus pressure and sinus pain. Negative for sore throat.    Respiratory:  Positive for cough.    Cardiovascular:  Negative for chest pain.   Psychiatric/Behavioral:  Positive for sleep disturbance. The patient is nervous/anxious.           Objective:      Vitals:    02/27/24 1128 02/27/24 1137   BP: (!) 144/70 126/70   Pulse: 82    SpO2: 100%    Weight: 63 kg (139 lb)    Height: 5' 2" (1.575 m)      Physical Exam  Vitals and nursing note " reviewed.   Constitutional:       General: She is not in acute distress.     Appearance: Normal appearance. She is well-developed. She is not ill-appearing.   HENT:      Right Ear: External ear normal.      Left Ear: External ear normal.      Nose: Congestion and rhinorrhea present.      Right Sinus: Frontal sinus tenderness present.      Left Sinus: Frontal sinus tenderness present.      Mouth/Throat:      Pharynx: Posterior oropharyngeal erythema present.      Tonsils: No tonsillar exudate.   Cardiovascular:      Rate and Rhythm: Normal rate and regular rhythm.      Heart sounds: Normal heart sounds.   Pulmonary:      Breath sounds: Normal breath sounds.   Lymphadenopathy:      Cervical: No cervical adenopathy.   Neurological:      Mental Status: She is alert.   Psychiatric:         Mood and Affect: Mood is anxious.           Assessment:       1. Primary hypertension    2. Uncontrolled type 2 diabetes mellitus with hyperglycemia    3. Gastroesophageal reflux disease, unspecified whether esophagitis present    4. Anxiety    5. Sinusitis, unspecified chronicity, unspecified location         Plan:       Primary hypertension  Comments:  bp controlled in office    Uncontrolled type 2 diabetes mellitus with hyperglycemia  Comments:  monitor sugar closely    Gastroesophageal reflux disease, unspecified whether esophagitis present  Comments:  protonix    Anxiety  Comments:  xanax prn. this week then buspar    Sinusitis, unspecified chronicity, unspecified location  Comments:  restart doxycycline. call with any problems.      Follow up in about 4 weeks (around 3/26/2024), or if symptoms worsen or fail to improve, for medication management.        3/4/2024 Karolina Ramirez

## 2024-03-06 ENCOUNTER — TELEPHONE (OUTPATIENT)
Dept: DERMATOLOGY | Facility: CLINIC | Age: 74
End: 2024-03-06
Payer: MEDICARE

## 2024-03-06 NOTE — TELEPHONE ENCOUNTER
----- Message from Holli Conley sent at 3/6/2024  8:10 AM CST -----  Regarding: appt on 3/27/24  Contact: pt  Type:  Needs Medical Advice    Who Called: pt  Would the patient rather a call back or a response via [facility]? Call back  Best Call Back Number: [phone]    Additional Information: sts he got a message regarding a sooner appt on 3/27/24 and she would like to take it--please advise

## 2024-03-07 ENCOUNTER — CLINICAL SUPPORT (OUTPATIENT)
Dept: FAMILY MEDICINE | Facility: CLINIC | Age: 74
End: 2024-03-07
Payer: MEDICARE

## 2024-03-07 VITALS — DIASTOLIC BLOOD PRESSURE: 74 MMHG | SYSTOLIC BLOOD PRESSURE: 140 MMHG

## 2024-03-07 DIAGNOSIS — I10 PRIMARY HYPERTENSION: Primary | ICD-10-CM

## 2024-03-07 NOTE — PROGRESS NOTES
Presents for BP check today, home logs are mostly WNL, one reading in the 140's.   concerned about elevated fasting glucose.   Not currently taking the glipizide, just watching diet and metformin bid.   Karolina will go speak to pt

## 2024-03-14 ENCOUNTER — TELEPHONE (OUTPATIENT)
Dept: FAMILY MEDICINE | Facility: CLINIC | Age: 74
End: 2024-03-14
Payer: MEDICARE

## 2024-03-14 NOTE — TELEPHONE ENCOUNTER
----- Message from Niurka Ross sent at 3/14/2024 12:12 PM CDT -----  Regarding: bs readings  Pt dropped off her sugar readings given to Sharona calderon

## 2024-03-27 ENCOUNTER — OFFICE VISIT (OUTPATIENT)
Dept: DERMATOLOGY | Facility: CLINIC | Age: 74
End: 2024-03-27
Payer: MEDICARE

## 2024-03-27 VITALS — HEIGHT: 62 IN | BODY MASS INDEX: 25.58 KG/M2 | WEIGHT: 139 LBS

## 2024-03-27 DIAGNOSIS — L30.8 PAPULAR ECZEMA: Primary | ICD-10-CM

## 2024-03-27 DIAGNOSIS — D22.9 BENIGN NEVUS: ICD-10-CM

## 2024-03-27 DIAGNOSIS — L57.8 ACTINIC SKIN DAMAGE: ICD-10-CM

## 2024-03-27 DIAGNOSIS — L72.9 CYST OF SKIN: ICD-10-CM

## 2024-03-27 DIAGNOSIS — D18.01 CHERRY ANGIOMA: ICD-10-CM

## 2024-03-27 DIAGNOSIS — L82.1 SEBORRHEIC KERATOSIS: ICD-10-CM

## 2024-03-27 DIAGNOSIS — F41.9 ANXIETY: ICD-10-CM

## 2024-03-27 DIAGNOSIS — L72.0 MILIA: ICD-10-CM

## 2024-03-27 PROCEDURE — 3061F NEG MICROALBUMINURIA REV: CPT | Mod: CPTII,S$GLB,, | Performed by: STUDENT IN AN ORGANIZED HEALTH CARE EDUCATION/TRAINING PROGRAM

## 2024-03-27 PROCEDURE — 1159F MED LIST DOCD IN RCRD: CPT | Mod: CPTII,S$GLB,, | Performed by: STUDENT IN AN ORGANIZED HEALTH CARE EDUCATION/TRAINING PROGRAM

## 2024-03-27 PROCEDURE — 3051F HG A1C>EQUAL 7.0%<8.0%: CPT | Mod: CPTII,S$GLB,, | Performed by: STUDENT IN AN ORGANIZED HEALTH CARE EDUCATION/TRAINING PROGRAM

## 2024-03-27 PROCEDURE — 4010F ACE/ARB THERAPY RXD/TAKEN: CPT | Mod: CPTII,S$GLB,, | Performed by: STUDENT IN AN ORGANIZED HEALTH CARE EDUCATION/TRAINING PROGRAM

## 2024-03-27 PROCEDURE — 99214 OFFICE O/P EST MOD 30 MIN: CPT | Mod: S$GLB,,, | Performed by: STUDENT IN AN ORGANIZED HEALTH CARE EDUCATION/TRAINING PROGRAM

## 2024-03-27 PROCEDURE — 1126F AMNT PAIN NOTED NONE PRSNT: CPT | Mod: CPTII,S$GLB,, | Performed by: STUDENT IN AN ORGANIZED HEALTH CARE EDUCATION/TRAINING PROGRAM

## 2024-03-27 PROCEDURE — 3008F BODY MASS INDEX DOCD: CPT | Mod: CPTII,S$GLB,, | Performed by: STUDENT IN AN ORGANIZED HEALTH CARE EDUCATION/TRAINING PROGRAM

## 2024-03-27 PROCEDURE — 3288F FALL RISK ASSESSMENT DOCD: CPT | Mod: CPTII,S$GLB,, | Performed by: STUDENT IN AN ORGANIZED HEALTH CARE EDUCATION/TRAINING PROGRAM

## 2024-03-27 PROCEDURE — 1160F RVW MEDS BY RX/DR IN RCRD: CPT | Mod: CPTII,S$GLB,, | Performed by: STUDENT IN AN ORGANIZED HEALTH CARE EDUCATION/TRAINING PROGRAM

## 2024-03-27 PROCEDURE — 1101F PT FALLS ASSESS-DOCD LE1/YR: CPT | Mod: CPTII,S$GLB,, | Performed by: STUDENT IN AN ORGANIZED HEALTH CARE EDUCATION/TRAINING PROGRAM

## 2024-03-27 PROCEDURE — 3066F NEPHROPATHY DOC TX: CPT | Mod: CPTII,S$GLB,, | Performed by: STUDENT IN AN ORGANIZED HEALTH CARE EDUCATION/TRAINING PROGRAM

## 2024-03-27 RX ORDER — TRIAMCINOLONE ACETONIDE 1 MG/G
CREAM TOPICAL 2 TIMES DAILY
Qty: 80 G | Refills: 1 | Status: SHIPPED | OUTPATIENT
Start: 2024-03-27

## 2024-03-27 RX ORDER — TRETINOIN 0.5 MG/G
CREAM TOPICAL NIGHTLY
Qty: 20 G | Refills: 3 | Status: SHIPPED | OUTPATIENT
Start: 2024-03-27

## 2024-03-27 NOTE — PROGRESS NOTES
Subjective:      Patient ID:  Kay Valencia is a 73 y.o. female who presents for   Chief Complaint   Patient presents with    Spot     On face,    Skin Check     [facility]     LOV- 6/14/21    Here today for a TBSC  C/o white spots on face x several weeks that are not bothersome  C/o rough spots on chest    Has no hx of NMSC  Has no fhx of MM        Review of Systems   Constitutional:  Negative for fever, chills and fatigue.   Skin:  Positive for daily sunscreen use. Negative for itching, rash, dry skin, activity-related sunscreen use and wears hat.   Hematologic/Lymphatic: Bruises/bleeds easily.       Objective:   Physical Exam   Constitutional: She appears well-developed and well-nourished. No distress.   Neurological: She is alert and oriented to person, place, and time. She is not disoriented.   Psychiatric: She has a normal mood and affect.   Skin:   Areas Examined (abnormalities noted in diagram):   Scalp / Hair Palpated and Inspected  Head / Face Inspection Performed  Neck Inspection Performed  Chest / Axilla Inspection Performed  Abdomen Inspection Performed  Genitals / Buttocks / Groin Inspection Performed  Back Inspection Performed  RUE Inspected  LUE Inspection Performed  RLE Inspected  LLE Inspection Performed  Nails and Digits Inspection Performed                 Diagram Legend     Erythematous scaling macule/papule c/w actinic keratosis       Vascular papule c/w angioma      Pigmented verrucoid papule/plaque c/w seborrheic keratosis      Yellow umbilicated papule c/w sebaceous hyperplasia      Irregularly shaped tan macule c/w lentigo     1-2 mm smooth white papules consistent with Milia      Movable subcutaneous cyst with punctum c/w epidermal inclusion cyst      Subcutaneous movable cyst c/w pilar cyst      Firm pink to brown papule c/w dermatofibroma      Pedunculated fleshy papule(s) c/w skin tag(s)      Evenly pigmented macule c/w junctional nevus     Mildly variegated pigmented, slightly  irregular-bordered macule c/w mildly atypical nevus      Flesh colored to evenly pigmented papule c/w intradermal nevus       Pink pearly papule/plaque c/w basal cell carcinoma      Erythematous hyperkeratotic cursted plaque c/w SCC      Surgical scar with no sign of skin cancer recurrence      Open and closed comedones      Inflammatory papules and pustules      Verrucoid papule consistent consistent with wart     Erythematous eczematous patches and plaques     Dystrophic onycholytic nail with subungual debris c/w onychomycosis     Umbilicated papule    Erythematous-base heme-crusted tan verrucoid plaque consistent with inflamed seborrheic keratosis     Erythematous Silvery Scaling Plaque c/w Psoriasis     See annotation      Assessment / Plan:        Papular eczema  -     triamcinolone acetonide 0.1% (KENALOG) 0.1 % cream; Apply topically 2 (two) times daily.  Dispense: 80 g; Refill: 1  Good skin care regimen discussed including limiting to one bath or shower/day, using lukewarm water with mild soap and moisturizing cream to skin 1 - 2x/day.   Biopsy if not improved    Milia  -     tretinoin (RETIN-A) 0.05 % cream; Apply topically every evening.  Dispense: 20 g; Refill: 3  Goodrx given    Seborrheic keratosis  These are benign inherited growths without a malignant potential. Reassurance given to patient. No treatment is necessary.     Cherry angioma  This is a benign vascular lesion. Reassurance given. No treatment required.     Benign nevus  Careful dermoscopy evaluation of nevi performed with none identified as needing biopsy today  Monitor for new mole or moles that are becoming bigger, darker, irritated, or developing irregular borders.     Actinic skin damage  Total body skin examination performed today including at least 12 points as noted in physical examination. No lesions suspicious for malignancy noted.  Patient instructed in importance in daily broad spectrum sun protection of at least spf 30. Mineral  sunscreen ingredients preferred (Zinc +/- Titanium) and can be found OTC.   Recommend Elta MD for daily use on face and neck.  Patient encouraged to wear hat for all outdoor exposure.   Also discussed sun avoidance and use of protective clothing.    Cyst of skin  Reassurance given to patient. No treatment is necessary.   Discussed treatment options - excision vs observation. Cysts may recur with excision. Pt will defer treatment at this time.          4 weeks for rash    No follow-ups on file.

## 2024-03-27 NOTE — TELEPHONE ENCOUNTER
----- Message from Barbara Diaz sent at 3/27/2024 12:56 PM CDT -----  Refill for Xanax. [facility] [place] [address] Pt #[phone]

## 2024-03-27 NOTE — TELEPHONE ENCOUNTER
Last OV 2/27/2024  Upcoming OV 4/10/2024    Unable to run . Pt in [place]. Per chart last filled 3/4/2024.    Rx order set up.

## 2024-03-28 RX ORDER — ALPRAZOLAM 1 MG/1
1 TABLET ORAL NIGHTLY
Qty: 30 TABLET | Refills: 2 | Status: SHIPPED | OUTPATIENT
Start: 2024-03-29

## 2024-04-10 DIAGNOSIS — I10 HYPERTENSION, UNSPECIFIED TYPE: ICD-10-CM

## 2024-04-10 DIAGNOSIS — E11.65 UNCONTROLLED TYPE 2 DIABETES MELLITUS WITH HYPERGLYCEMIA: ICD-10-CM

## 2024-04-10 DIAGNOSIS — K21.9 GASTROESOPHAGEAL REFLUX DISEASE, UNSPECIFIED WHETHER ESOPHAGITIS PRESENT: ICD-10-CM

## 2024-04-10 DIAGNOSIS — E78.5 HYPERLIPIDEMIA, UNSPECIFIED HYPERLIPIDEMIA TYPE: ICD-10-CM

## 2024-04-10 DIAGNOSIS — I10 ESSENTIAL HYPERTENSION: ICD-10-CM

## 2024-04-10 NOTE — TELEPHONE ENCOUNTER
----- Message from Olga Kyle sent at 4/10/2024  8:31 AM CDT -----  The patient cancelled her appointment for today due to the weather. The next appointment that  Karolina had was 05/29/2024. Can she wait that long to get her A1C checked?  She did not want the 8 am appointment that we had. Please call. If you reschedule her she wants an afternoon appointment.  Pt's # [phone]

## 2024-04-11 RX ORDER — ATORVASTATIN CALCIUM 40 MG/1
60 TABLET, FILM COATED ORAL DAILY
Qty: 135 TABLET | Refills: 3 | Status: SHIPPED | OUTPATIENT
Start: 2024-04-11

## 2024-04-11 RX ORDER — HYDRALAZINE HYDROCHLORIDE 25 MG/1
25 TABLET, FILM COATED ORAL 2 TIMES DAILY
Qty: 180 TABLET | Refills: 3 | Status: SHIPPED | OUTPATIENT
Start: 2024-04-11

## 2024-04-11 RX ORDER — PANTOPRAZOLE SODIUM 40 MG/1
40 TABLET, DELAYED RELEASE ORAL DAILY
Qty: 90 TABLET | Refills: 3 | Status: SHIPPED | OUTPATIENT
Start: 2024-04-11

## 2024-04-11 RX ORDER — POTASSIUM CHLORIDE 750 MG/1
10 CAPSULE, EXTENDED RELEASE ORAL DAILY
Qty: 90 CAPSULE | Refills: 3 | Status: SHIPPED | OUTPATIENT
Start: 2024-04-11

## 2024-04-11 RX ORDER — HYDROCHLOROTHIAZIDE 12.5 MG/1
25 TABLET ORAL DAILY
Qty: 180 TABLET | Refills: 3 | Status: SHIPPED | OUTPATIENT
Start: 2024-04-11

## 2024-04-11 RX ORDER — METFORMIN HYDROCHLORIDE 500 MG/1
500 TABLET ORAL 2 TIMES DAILY
Qty: 180 TABLET | Refills: 3 | Status: SHIPPED | OUTPATIENT
Start: 2024-04-11

## 2024-04-11 NOTE — TELEPHONE ENCOUNTER
----- Message from Olga Kyle sent at 4/11/2024  8:24 AM CDT -----  She needs Karolina to send the Medications that goes to [facility] pharmacy. She said Karolina knew which ones go to that mail off pharmacy. The patient called yesterday about her A1C. Please call  pt's # [phone] GH

## 2024-04-25 ENCOUNTER — OFFICE VISIT (OUTPATIENT)
Dept: FAMILY MEDICINE | Facility: CLINIC | Age: 74
End: 2024-04-25
Payer: MEDICARE

## 2024-04-25 VITALS
HEART RATE: 76 BPM | HEIGHT: 63 IN | DIASTOLIC BLOOD PRESSURE: 72 MMHG | BODY MASS INDEX: 24.8 KG/M2 | SYSTOLIC BLOOD PRESSURE: 142 MMHG | WEIGHT: 140 LBS | OXYGEN SATURATION: 98 %

## 2024-04-25 DIAGNOSIS — K21.9 GASTROESOPHAGEAL REFLUX DISEASE, UNSPECIFIED WHETHER ESOPHAGITIS PRESENT: ICD-10-CM

## 2024-04-25 DIAGNOSIS — R11.0 NAUSEA: ICD-10-CM

## 2024-04-25 DIAGNOSIS — I10 ESSENTIAL HYPERTENSION: ICD-10-CM

## 2024-04-25 DIAGNOSIS — Z79.899 ENCOUNTER FOR LONG-TERM (CURRENT) USE OF OTHER MEDICATIONS: ICD-10-CM

## 2024-04-25 DIAGNOSIS — E11.65 UNCONTROLLED TYPE 2 DIABETES MELLITUS WITH HYPERGLYCEMIA: Primary | ICD-10-CM

## 2024-04-25 DIAGNOSIS — Z78.0 MENOPAUSE: ICD-10-CM

## 2024-04-25 DIAGNOSIS — E78.5 HYPERLIPIDEMIA, UNSPECIFIED HYPERLIPIDEMIA TYPE: ICD-10-CM

## 2024-04-25 DIAGNOSIS — Z51.81 ENCOUNTER FOR THERAPEUTIC DRUG MONITORING: ICD-10-CM

## 2024-04-25 DIAGNOSIS — F41.9 ANXIETY: ICD-10-CM

## 2024-04-25 LAB — HBA1C MFR BLD: 5.9 %

## 2024-04-25 PROCEDURE — 3008F BODY MASS INDEX DOCD: CPT | Mod: CPTII,S$GLB,, | Performed by: NURSE PRACTITIONER

## 2024-04-25 PROCEDURE — 3061F NEG MICROALBUMINURIA REV: CPT | Mod: CPTII,S$GLB,, | Performed by: NURSE PRACTITIONER

## 2024-04-25 PROCEDURE — 1126F AMNT PAIN NOTED NONE PRSNT: CPT | Mod: CPTII,S$GLB,, | Performed by: NURSE PRACTITIONER

## 2024-04-25 PROCEDURE — 3077F SYST BP >= 140 MM HG: CPT | Mod: CPTII,S$GLB,, | Performed by: NURSE PRACTITIONER

## 2024-04-25 PROCEDURE — 4010F ACE/ARB THERAPY RXD/TAKEN: CPT | Mod: CPTII,S$GLB,, | Performed by: NURSE PRACTITIONER

## 2024-04-25 PROCEDURE — 83036 HEMOGLOBIN GLYCOSYLATED A1C: CPT | Mod: QW,,, | Performed by: NURSE PRACTITIONER

## 2024-04-25 PROCEDURE — 3066F NEPHROPATHY DOC TX: CPT | Mod: CPTII,S$GLB,, | Performed by: NURSE PRACTITIONER

## 2024-04-25 PROCEDURE — 3078F DIAST BP <80 MM HG: CPT | Mod: CPTII,S$GLB,, | Performed by: NURSE PRACTITIONER

## 2024-04-25 PROCEDURE — 99214 OFFICE O/P EST MOD 30 MIN: CPT | Mod: S$GLB,,, | Performed by: NURSE PRACTITIONER

## 2024-04-25 PROCEDURE — 1159F MED LIST DOCD IN RCRD: CPT | Mod: CPTII,S$GLB,, | Performed by: NURSE PRACTITIONER

## 2024-04-25 PROCEDURE — 1101F PT FALLS ASSESS-DOCD LE1/YR: CPT | Mod: CPTII,S$GLB,, | Performed by: NURSE PRACTITIONER

## 2024-04-25 PROCEDURE — 3288F FALL RISK ASSESSMENT DOCD: CPT | Mod: CPTII,S$GLB,, | Performed by: NURSE PRACTITIONER

## 2024-04-25 PROCEDURE — 3044F HG A1C LEVEL LT 7.0%: CPT | Mod: CPTII,S$GLB,, | Performed by: NURSE PRACTITIONER

## 2024-04-25 PROCEDURE — 1160F RVW MEDS BY RX/DR IN RCRD: CPT | Mod: CPTII,S$GLB,, | Performed by: NURSE PRACTITIONER

## 2024-04-25 RX ORDER — LOSARTAN POTASSIUM 100 MG/1
100 TABLET ORAL DAILY
Qty: 90 TABLET | Refills: 0 | Status: SHIPPED | OUTPATIENT
Start: 2024-04-25 | End: 2024-07-24

## 2024-04-25 RX ORDER — ONDANSETRON 4 MG/1
4 TABLET, ORALLY DISINTEGRATING ORAL EVERY 8 HOURS PRN
Qty: 30 TABLET | Refills: 0 | Status: SHIPPED | OUTPATIENT
Start: 2024-04-25

## 2024-04-25 RX ORDER — GLIPIZIDE 5 MG/1
5 TABLET ORAL
Qty: 30 TABLET | Refills: 2 | Status: SHIPPED | OUTPATIENT
Start: 2024-04-25 | End: 2025-04-25

## 2024-04-25 RX ORDER — LOSARTAN POTASSIUM 100 MG/1
100 TABLET ORAL DAILY
Qty: 90 TABLET | Refills: 0 | Status: SHIPPED | OUTPATIENT
Start: 2024-04-25 | End: 2024-04-25 | Stop reason: SDUPTHER

## 2024-04-25 NOTE — TELEPHONE ENCOUNTER
Spoke to pt and let her know per Karolina. Pt verbalized understanding, Pt needs losartan send to [facility] [place]    EOS calculated successfully. EOS Risk Factor: 0.5/1000 live births ([facility] national incidence); GA=37w2d; Temp=99; ROM=0.133; GBS='Negative'; Antibiotics='No antibiotics or any antibiotics < 2 hrs prior to birth'   EOS calculated successfully. EOS Risk Factor: 0.5/1000 live births ([facility] national incidence); GA=37w2d; Temp=99; ROM=0.133; GBS='Negative'; Antibiotics='No antibiotics or any antibiotics < 2 hrs prior to birth'

## 2024-04-25 NOTE — TELEPHONE ENCOUNTER
----- Message from Kimberly Fishman sent at 4/25/2024  2:07 PM CDT -----  Pt bp is 162/80 pt is feeling better. Please call pt to let her know further steps.   [phone]

## 2024-05-07 ENCOUNTER — TELEPHONE (OUTPATIENT)
Dept: FAMILY MEDICINE | Facility: CLINIC | Age: 74
End: 2024-05-07
Payer: MEDICARE

## 2024-05-07 NOTE — TELEPHONE ENCOUNTER
----- Message from Patricia Hedrick sent at 5/7/2024 11:54 AM CDT -----  Pt came in and dropped off envelope for provider. I gave to Andra.  [phone]

## 2024-05-07 NOTE — PROGRESS NOTES
SUBJECTIVE:    Patient ID: Kay Valencia is a 73 y.o. female.    Chief Complaint: Follow-up (Bottles brought//Pt here for follow up//Pt c/o nausea and vomiting//would like dexa//JL )    73-year-old female presents for urgent visit. She is treated for diabetes, htn, oa, anxiety, insomnia, she is taking medications as prescribed. Today reports respiratory symptoms have resolved but now has nausea. Was not able to eat this am. No vomiting. No fever no diarrhea.  No known sick contacts. Has not taken anything today  Bp remains elevated. No chest pain. No sob. Hga1c is down to 5.9. watching diet. Still has some anxiety. Does not want additional medication for this    Follow-up  Associated symptoms include congestion and nausea. Pertinent negatives include no abdominal pain, arthralgias, chest pain, chills, coughing, fever, headaches, rash, sore throat, vomiting or weakness.       Office Visit on 04/25/2024   Component Date Value Ref Range Status    Hemoglobin A1C, POC 04/25/2024 5.9  % Final   Office Visit on 01/11/2024   Component Date Value Ref Range Status    Hemoglobin A1C, POC 01/11/2024 7.0  % Final    WBC 01/11/2024 4.2  3.8 - 10.8 Thousand/uL Final    RBC 01/11/2024 3.71 (L)  3.80 - 5.10 Million/uL Final    Hemoglobin 01/11/2024 11.2 (L)  11.7 - 15.5 g/dL Final    Hematocrit 01/11/2024 34.0 (L)  35.0 - 45.0 % Final    MCV 01/11/2024 91.6  80.0 - 100.0 fL Final    MCH 01/11/2024 30.2  27.0 - 33.0 pg Final    MCHC 01/11/2024 32.9  32.0 - 36.0 g/dL Final    RDW 01/11/2024 12.8  11.0 - 15.0 % Final    Platelets 01/11/2024 147  140 - 400 Thousand/uL Final    MPV 01/11/2024 10.8  7.5 - 12.5 fL Final    Neutrophils, Abs 01/11/2024 2,306  1,500 - 7,800 cells/uL Final    Lymph # 01/11/2024 1,130  850 - 3,900 cells/uL Final    Mono # 01/11/2024 487  200 - 950 cells/uL Final    Eos # 01/11/2024 218  15 - 500 cells/uL Final    Baso # 01/11/2024 59  0 - 200 cells/uL Final    Neutrophils Relative 01/11/2024 54.9  % Final     Lymph % 01/11/2024 26.9  % Final    Mono % 01/11/2024 11.6  % Final    Eosinophil % 01/11/2024 5.2  % Final    Basophil % 01/11/2024 1.4  % Final    Glucose 01/11/2024 107 (H)  65 - 99 mg/dL Final    BUN 01/11/2024 25  7 - 25 mg/dL Final    Creatinine 01/11/2024 1.08 (H)  0.60 - 1.00 mg/dL Final    eGFR 01/11/2024 55 (L)  > OR = 60 mL/min/1.73m2 Final    BUN/Creatinine Ratio 01/11/2024 23 (H)  6 - 22 (calc) Final    Sodium 01/11/2024 140  135 - 146 mmol/L Final    Potassium 01/11/2024 4.4  3.5 - 5.3 mmol/L Final    Chloride 01/11/2024 102  98 - 110 mmol/L Final    CO2 01/11/2024 28  20 - 32 mmol/L Final    Calcium 01/11/2024 10.3  8.6 - 10.4 mg/dL Final    Total Protein 01/11/2024 6.9  6.1 - 8.1 g/dL Final    Albumin 01/11/2024 4.6  3.6 - 5.1 g/dL Final    Globulin, Total 01/11/2024 2.3  1.9 - 3.7 g/dL (calc) Final    Albumin/Globulin Ratio 01/11/2024 2.0  1.0 - 2.5 (calc) Final    Total Bilirubin 01/11/2024 0.8  0.2 - 1.2 mg/dL Final    Alkaline Phosphatase 01/11/2024 67  37 - 153 U/L Final    AST 01/11/2024 22  10 - 35 U/L Final    ALT 01/11/2024 27  6 - 29 U/L Final    Cholesterol 01/11/2024 203 (H)  <200 mg/dL Final    HDL 01/11/2024 50  > OR = 50 mg/dL Final    Triglycerides 01/11/2024 273 (H)  <150 mg/dL Final    LDL Cholesterol 01/11/2024 114 (H)  mg/dL (calc) Final    HDL/Cholesterol Ratio 01/11/2024 4.1  <5.0 (calc) Final    Non HDL Chol. (LDL+VLDL) 01/11/2024 153 (H)  <130 mg/dL (calc) Final    TSH w/reflex to FT4 01/11/2024 0.63  0.40 - 4.50 mIU/L Final    Creatinine, Urine 01/11/2024 29  20 - 275 mg/dL Final    Microalb, Ur 01/11/2024 0.6  See Note: mg/dL Final    Microalb/Creat Ratio 01/11/2024 21  <30 mcg/mg creat Final    Color, UA 01/11/2024 YELLOW  YELLOW Final    Appearance, UA 01/11/2024 CLEAR  CLEAR Final    Specific Gravity, UA 01/11/2024 1.008  1.001 - 1.035 Final    pH, UA 01/11/2024 8.0  5.0 - 8.0 Final    Glucose, UA 01/11/2024 NEGATIVE  NEGATIVE Final    Bilirubin, UA 01/11/2024 NEGATIVE   NEGATIVE Final    Ketones, UA 01/11/2024 NEGATIVE  NEGATIVE Final    Occult Blood UA 01/11/2024 NEGATIVE  NEGATIVE Final    Protein, UA 01/11/2024 NEGATIVE  NEGATIVE Final    Nitrite, UA 01/11/2024 NEGATIVE  NEGATIVE Final    Leukocytes, UA 01/11/2024 1+ (A)  NEGATIVE Final    WBC Casts, UA 01/11/2024 NONE SEEN  < OR = 5 /HPF Final    RBC Casts, UA 01/11/2024 NONE SEEN  < OR = 2 /HPF Final    Squam Epithel, UA 01/11/2024 0-5  < OR = 5 /HPF Final    Bacteria, UA 01/11/2024 NONE SEEN  NONE SEEN /HPF Final    Hyaline Casts, UA 01/11/2024 NONE SEEN  NONE SEEN /LPF Final    Service Cmt: 01/11/2024    Final    Reflexive Urine Culture 01/11/2024    Final    Urine Culture, Routine 01/11/2024    Final       Past Medical History:   Diagnosis Date    Anxiety     Depression     Diabetes mellitus, type 2     GERD (gastroesophageal reflux disease)     Hyperlipidemia     Hypertension      Social History     Socioeconomic History    Marital status:    Tobacco Use    Smoking status: Former    Smokeless tobacco: Never   Substance and Sexual Activity    Alcohol use: No    Drug use: No    Sexual activity: Yes     Social Determinants of Health     Financial Resource Strain: Low Risk  (9/8/2020)    Overall Financial Resource Strain (CARDIA)     Difficulty of Paying Living Expenses: Not very hard   Transportation Needs: No Transportation Needs (9/8/2020)    PRAPARE - Transportation     Lack of Transportation (Medical): No     Lack of Transportation (Non-Medical): No   Stress: Stress Concern Present (9/8/2020)    [employer] [place] [employer] [employer] - Occupational Stress Questionnaire     Feeling of Stress : To some extent     Past Surgical History:   Procedure Laterality Date    BREAST BIOPSY Left 1990'S    BENIGN    HYSTERECTOMY      INCONTINENCE SURGERY       Family History   Problem Relation Name Age of Onset    Diabetes Maternal Grandmother      Hypertension Maternal Grandmother      Diabetes Father      Breast cancer  Mother  57    Hypertension Mother      Diabetes Sister      Ovarian cancer Neg Hx         Tests to Keep You Healthy    Mammogram: Met on 11/30/2023  Eye Exam: Met on 8/22/2023  Colon Cancer Screening: Met on 9/21/2017  Last Blood Pressure <= 139/89 (4/25/2024): NO  Last HbA1c < 8 (04/25/2024): Yes      Review of patient's allergies indicates:   Allergen Reactions    Flexeril [cyclobenzaprine] Other (See Comments)     Unknown was a long time ago.    Keflex [cephalexin] Diarrhea    Sulfa (sulfonamide antibiotics) Nausea Only    Augmentin [amoxicillin-pot clavulanate] Other (See Comments)     Gas       Current Outpatient Medications:     ALPRAZolam (XANAX) 1 MG tablet, Take 1 tablet (1 mg total) by mouth every evening., Disp: 30 tablet, Rfl: 2    atorvastatin (LIPITOR) 40 MG tablet, Take 1.5 tablets (60 mg total) by mouth once daily., Disp: 135 tablet, Rfl: 3    blood sugar diagnostic Strp, To check BG 1 times daily, to use with insurance preferred meter, Disp: 200 strip, Rfl: 1    blood-glucose meter kit, To check BG 1 times daily, to use with insurance preferred meter, Disp: 1 each, Rfl: 0    busPIRone (BUSPAR) 10 MG tablet, Take 1 tablet (10 mg total) by mouth 3 (three) times daily., Disp: 90 tablet, Rfl: 11    calcium carbonate/vitamin D3 (CALTRATE 600 PLUS D ORAL), Caltrate 600 plus D, Disp: , Rfl:     famotidine (PEPCID) 40 MG tablet, Take 1 tablet (40 mg total) by mouth once daily., Disp: 30 tablet, Rfl: 11    hydrALAZINE (APRESOLINE) 25 MG tablet, Take 1 tablet (25 mg total) by mouth 2 (two) times a day., Disp: 180 tablet, Rfl: 3    hydroCHLOROthiazide (HYDRODIURIL) 12.5 MG Tab, Take 2 tablets (25 mg total) by mouth once daily., Disp: 180 tablet, Rfl: 3    HYDROcodone-acetaminophen (NORCO)  mg per tablet, Take 1 tablet by mouth every 6 (six) hours as needed for Pain., Disp: , Rfl:     lancets 33 gauge Misc, 1 lancet by Misc.(Non-Drug; Combo Route) route 3 (three) times a week., Disp: 100 each, Rfl: 3     lancets Misc, To check BG 1 times daily, to use with insurance preferred meter, Disp: 200 each, Rfl: 1    melatonin 5 mg Cap, Take 10 mg by mouth., Disp: , Rfl:     meloxicam (MOBIC) 15 MG tablet, Take 1 tablet (15 mg total) by mouth once daily., Disp: 90 tablet, Rfl: 1    metFORMIN (GLUCOPHAGE) 500 MG tablet, Take 1 tablet (500 mg total) by mouth 2 (two) times daily., Disp: 180 tablet, Rfl: 3    pantoprazole (PROTONIX) 40 MG tablet, Take 1 tablet (40 mg total) by mouth once daily., Disp: 90 tablet, Rfl: 3    potassium chloride (MICRO-K) 10 MEQ CpSR, Take 1 capsule (10 mEq total) by mouth once daily., Disp: 90 capsule, Rfl: 3    tretinoin (RETIN-A) 0.05 % cream, Apply topically every evening., Disp: 20 g, Rfl: 3    triamcinolone acetonide 0.1% (KENALOG) 0.1 % cream, Apply topically 2 (two) times daily., Disp: 80 g, Rfl: 1    glipiZIDE (GLUCOTROL) 5 MG tablet, Take 1 tablet (5 mg total) by mouth daily with breakfast., Disp: 30 tablet, Rfl: 2    Lactobacillus rhamnosus GG (CULTURELLE) 10 billion cell capsule, Take 1 capsule by mouth once daily. (Patient not taking: Reported on 4/25/2024), Disp: , Rfl:     losartan (COZAAR) 100 MG tablet, Take 1 tablet (100 mg total) by mouth once daily., Disp: 90 tablet, Rfl: 0    omega 3-dha-epa-fish oil 100-160-1,000 mg Cap, Fish Oil (Patient not taking: Reported on 4/25/2024), Disp: , Rfl:     ondansetron (ZOFRAN-ODT) 4 MG TbDL, Take 1 tablet (4 mg total) by mouth every 8 (eight) hours as needed., Disp: 30 tablet, Rfl: 0    Review of Systems   Constitutional:  Negative for chills, fever and unexpected weight change.   HENT:  Positive for congestion. Negative for ear pain, rhinorrhea and sore throat.    Eyes:  Negative for pain and visual disturbance.   Respiratory:  Negative for cough and shortness of breath.    Cardiovascular:  Negative for chest pain, palpitations and leg swelling.   Gastrointestinal:  Positive for nausea. Negative for abdominal pain, diarrhea and vomiting.  "  Genitourinary:  Negative for difficulty urinating, hematuria and vaginal bleeding.   Musculoskeletal:  Negative for arthralgias.   Skin:  Negative for rash.   Neurological:  Negative for dizziness, weakness and headaches.   Psychiatric/Behavioral:  Negative for agitation and sleep disturbance. The patient is not nervous/anxious.           Objective:      Vitals:    04/25/24 0957 04/25/24 1014 04/25/24 1055   BP: (!) 170/80 (!) 168/64 (!) 142/72   Pulse: 76     SpO2: 98%     Weight: 63.5 kg (140 lb)     Height: 5' 3" (1.6 m)       Physical Exam  Vitals and nursing note reviewed.   Constitutional:       General: She is not in acute distress.     Appearance: Normal appearance. She is well-developed.   HENT:      Head: Normocephalic.      Right Ear: External ear normal.      Left Ear: External ear normal.   Eyes:      Conjunctiva/sclera: Conjunctivae normal.      Pupils: Pupils are equal, round, and reactive to light.   Neck:      Vascular: No JVD.   Cardiovascular:      Rate and Rhythm: Normal rate and regular rhythm.      Heart sounds: No murmur heard.  Pulmonary:      Effort: Pulmonary effort is normal.      Breath sounds: Normal breath sounds.   Abdominal:      General: Bowel sounds are normal.      Palpations: Abdomen is soft.   Musculoskeletal:         General: No deformity. Normal range of motion.      Cervical back: Normal range of motion and neck supple.   Lymphadenopathy:      Cervical: No cervical adenopathy.   Skin:     General: Skin is warm and dry.      Findings: No rash.   Neurological:      Mental Status: She is alert and oriented to person, place, and time.      Gait: Gait normal.   Psychiatric:         Mood and Affect: Mood is anxious.         Speech: Speech normal.         Behavior: Behavior normal.           Assessment:       1. Uncontrolled type 2 diabetes mellitus with hyperglycemia    2. Anxiety    3. Encounter for long-term (current) use of other medications    4. Encounter for therapeutic drug " monitoring    5. Essential hypertension    6. Menopause    7. Gastroesophageal reflux disease, unspecified whether esophagitis present    8. Hyperlipidemia, unspecified hyperlipidemia type    9. Nausea         Plan:       Uncontrolled type 2 diabetes mellitus with hyperglycemia  Comments:  5.9  Orders:  -     Hemoglobin A1C, POCT    Anxiety    Encounter for long-term (current) use of other medications    Encounter for therapeutic drug monitoring    Essential hypertension  Comments:  bp elevated. monitor and send readings  Orders:  -     Discontinue: losartan (COZAAR) 100 MG tablet; Take 1 tablet (100 mg total) by mouth once daily.  Dispense: 90 tablet; Refill: 0    Menopause  -     DXA Bone Density Axial Skeleton 1 or more sites; Future; Expected date: 04/25/2024    Gastroesophageal reflux disease, unspecified whether esophagitis present  Comments:  protonix. pepcid    Hyperlipidemia, unspecified hyperlipidemia type  Comments:  lipitor    Nausea  Comments:  zofran.    Other orders  -     glipiZIDE (GLUCOTROL) 5 MG tablet; Take 1 tablet (5 mg total) by mouth daily with breakfast.  Dispense: 30 tablet; Refill: 2  -     ondansetron (ZOFRAN-ODT) 4 MG TbDL; Take 1 tablet (4 mg total) by mouth every 8 (eight) hours as needed.  Dispense: 30 tablet; Refill: 0      Follow up in about 3 months (around 7/25/2024), or if symptoms worsen or fail to improve, for medication management.        5/10/2024 Karolina Ramirez

## 2024-05-20 ENCOUNTER — HOSPITAL ENCOUNTER (OUTPATIENT)
Dept: RADIOLOGY | Facility: HOSPITAL | Age: 74
Discharge: HOME | End: 2024-05-20
Attending: NURSE PRACTITIONER
Payer: MEDICARE

## 2024-05-20 ENCOUNTER — TELEPHONE (OUTPATIENT)
Dept: FAMILY MEDICINE | Facility: CLINIC | Age: 74
End: 2024-05-20
Payer: MEDICARE

## 2024-05-20 DIAGNOSIS — Z78.0 MENOPAUSE: ICD-10-CM

## 2024-05-20 PROCEDURE — 77080 DXA BONE DENSITY AXIAL: CPT | Mod: 26,,, | Performed by: RADIOLOGY

## 2024-05-20 PROCEDURE — 77080 DXA BONE DENSITY AXIAL: CPT | Mod: TC,PO

## 2024-05-20 NOTE — TELEPHONE ENCOUNTER
----- Message from Karolina Ramirez NP sent at 5/20/2024  1:20 PM CDT -----  Osteopenia. Increase in bone density since previous. Continue as is.

## 2024-05-28 ENCOUNTER — TELEPHONE (OUTPATIENT)
Dept: FAMILY MEDICINE | Facility: CLINIC | Age: 74
End: 2024-05-28
Payer: MEDICARE

## 2024-05-28 NOTE — TELEPHONE ENCOUNTER
----- Message from Shani Lee sent at 5/28/2024 12:20 PM CDT -----  Patient brought in paperwork to be signed and faxed.  She is signing up for [employer]  [employer].  Call when done [phone].  Gave to Sharona

## 2024-06-25 ENCOUNTER — TELEPHONE (OUTPATIENT)
Dept: FAMILY MEDICINE | Facility: CLINIC | Age: 74
End: 2024-06-25
Payer: MEDICARE

## 2024-06-25 DIAGNOSIS — F41.9 ANXIETY: ICD-10-CM

## 2024-06-25 RX ORDER — ALPRAZOLAM 1 MG/1
1 TABLET ORAL NIGHTLY
Qty: 30 TABLET | Refills: 2 | Status: SHIPPED | OUTPATIENT
Start: 2024-06-25

## 2024-06-25 NOTE — TELEPHONE ENCOUNTER
----- Message from Olga Kyle sent at 6/25/2024  9:11 AM CDT -----  Refill Xanax [facility] [address] in [place] pt's # [phone] GH

## 2024-07-02 ENCOUNTER — TELEPHONE (OUTPATIENT)
Dept: FAMILY MEDICINE | Facility: CLINIC | Age: 74
End: 2024-07-02
Payer: MEDICARE

## 2024-07-02 NOTE — TELEPHONE ENCOUNTER
Only adminstered 500mg of Rocephin not 250mg and 500mg Spoke with pharmacy      Jael Anders LPN  07/01/24 2005     ----- Message from Olga Kyle sent at 9/25/2019  2:03 PM CDT -----  Contact: Kay  The patient has a UTI . She has a lot of pressure and frequent urination . [facility]  [address] pts # [phone] GH

## 2024-07-02 NOTE — TELEPHONE ENCOUNTER
----- Message from Patricia Hedrick sent at 7/2/2024 10:50 AM CDT -----  Pt dropped off an envelope for provider. I gave to Sharona.  [phone]

## 2024-07-03 ENCOUNTER — TELEPHONE (OUTPATIENT)
Dept: FAMILY MEDICINE | Facility: CLINIC | Age: 74
End: 2024-07-03

## 2024-07-03 ENCOUNTER — OFFICE VISIT (OUTPATIENT)
Dept: FAMILY MEDICINE | Facility: CLINIC | Age: 74
End: 2024-07-03
Payer: MEDICARE

## 2024-07-03 VITALS
HEIGHT: 63 IN | BODY MASS INDEX: 25.16 KG/M2 | DIASTOLIC BLOOD PRESSURE: 70 MMHG | WEIGHT: 142 LBS | HEART RATE: 78 BPM | OXYGEN SATURATION: 96 % | SYSTOLIC BLOOD PRESSURE: 142 MMHG

## 2024-07-03 DIAGNOSIS — E11.65 UNCONTROLLED TYPE 2 DIABETES MELLITUS WITH HYPERGLYCEMIA: ICD-10-CM

## 2024-07-03 DIAGNOSIS — L03.011 PARONYCHIA OF FINGER OF RIGHT HAND: Primary | ICD-10-CM

## 2024-07-03 DIAGNOSIS — I10 PRIMARY HYPERTENSION: ICD-10-CM

## 2024-07-03 PROCEDURE — 4010F ACE/ARB THERAPY RXD/TAKEN: CPT | Mod: CPTII,S$GLB,, | Performed by: FAMILY MEDICINE

## 2024-07-03 PROCEDURE — 3066F NEPHROPATHY DOC TX: CPT | Mod: CPTII,S$GLB,, | Performed by: FAMILY MEDICINE

## 2024-07-03 PROCEDURE — 1126F AMNT PAIN NOTED NONE PRSNT: CPT | Mod: CPTII,S$GLB,, | Performed by: FAMILY MEDICINE

## 2024-07-03 PROCEDURE — 3044F HG A1C LEVEL LT 7.0%: CPT | Mod: CPTII,S$GLB,, | Performed by: FAMILY MEDICINE

## 2024-07-03 PROCEDURE — 3077F SYST BP >= 140 MM HG: CPT | Mod: CPTII,S$GLB,, | Performed by: FAMILY MEDICINE

## 2024-07-03 PROCEDURE — 3061F NEG MICROALBUMINURIA REV: CPT | Mod: CPTII,S$GLB,, | Performed by: FAMILY MEDICINE

## 2024-07-03 PROCEDURE — 1101F PT FALLS ASSESS-DOCD LE1/YR: CPT | Mod: CPTII,S$GLB,, | Performed by: FAMILY MEDICINE

## 2024-07-03 PROCEDURE — 3288F FALL RISK ASSESSMENT DOCD: CPT | Mod: CPTII,S$GLB,, | Performed by: FAMILY MEDICINE

## 2024-07-03 PROCEDURE — 3008F BODY MASS INDEX DOCD: CPT | Mod: CPTII,S$GLB,, | Performed by: FAMILY MEDICINE

## 2024-07-03 PROCEDURE — 3078F DIAST BP <80 MM HG: CPT | Mod: CPTII,S$GLB,, | Performed by: FAMILY MEDICINE

## 2024-07-03 PROCEDURE — 99213 OFFICE O/P EST LOW 20 MIN: CPT | Mod: S$GLB,,, | Performed by: FAMILY MEDICINE

## 2024-07-03 RX ORDER — CLINDAMYCIN HYDROCHLORIDE 300 MG/1
300 CAPSULE ORAL 3 TIMES DAILY
Qty: 30 CAPSULE | Refills: 0 | Status: SHIPPED | OUTPATIENT
Start: 2024-07-03

## 2024-07-03 RX ORDER — ZOSTER VACCINE RECOMBINANT, ADJUVANTED 50 MCG/0.5
KIT INTRAMUSCULAR
COMMUNITY
Start: 2024-04-26

## 2024-07-03 NOTE — TELEPHONE ENCOUNTER
----- Message from Kimberly Fishman sent at 7/3/2024  7:44 AM CDT -----  Pt states her finger on her right hand is infected and pt is in pain would like to be seen today.   [phone]

## 2024-07-07 NOTE — PROGRESS NOTES
SUBJECTIVE:    Patient ID: Kay Valencia is a 73 y.o. female.    Chief Complaint: Wound Infection (No bottles//Pt c/o infection in left pinky finger. Site is red and swollen. Slightly warm to touch. Noticed it yesterday. Soaked in some peroxide//JL)    73-year-old female with an infected right little finger.  She has been doing a lot of dish washing lately.  24 hours ago she began having redness and swelling around the nail bed of her little finger.  She now has streaking up the little finger towards her MCP joint, no fever chill but it is somewhat painful.        Office Visit on 04/25/2024   Component Date Value Ref Range Status    Hemoglobin A1C, POC 04/25/2024 5.9  % Final   Office Visit on 01/11/2024   Component Date Value Ref Range Status    Hemoglobin A1C, POC 01/11/2024 7.0  % Final    WBC 01/11/2024 4.2  3.8 - 10.8 Thousand/uL Final    RBC 01/11/2024 3.71 (L)  3.80 - 5.10 Million/uL Final    Hemoglobin 01/11/2024 11.2 (L)  11.7 - 15.5 g/dL Final    Hematocrit 01/11/2024 34.0 (L)  35.0 - 45.0 % Final    MCV 01/11/2024 91.6  80.0 - 100.0 fL Final    MCH 01/11/2024 30.2  27.0 - 33.0 pg Final    MCHC 01/11/2024 32.9  32.0 - 36.0 g/dL Final    RDW 01/11/2024 12.8  11.0 - 15.0 % Final    Platelets 01/11/2024 147  140 - 400 Thousand/uL Final    MPV 01/11/2024 10.8  7.5 - 12.5 fL Final    Neutrophils, Abs 01/11/2024 2,306  1,500 - 7,800 cells/uL Final    Lymph # 01/11/2024 1,130  850 - 3,900 cells/uL Final    Mono # 01/11/2024 487  200 - 950 cells/uL Final    Eos # 01/11/2024 218  15 - 500 cells/uL Final    Baso # 01/11/2024 59  0 - 200 cells/uL Final    Neutrophils Relative 01/11/2024 54.9  % Final    Lymph % 01/11/2024 26.9  % Final    Mono % 01/11/2024 11.6  % Final    Eosinophil % 01/11/2024 5.2  % Final    Basophil % 01/11/2024 1.4  % Final    Glucose 01/11/2024 107 (H)  65 - 99 mg/dL Final    BUN 01/11/2024 25  7 - 25 mg/dL Final    Creatinine 01/11/2024 1.08 (H)  0.60 - 1.00 mg/dL Final    eGFR 01/11/2024  55 (L)  > OR = 60 mL/min/1.73m2 Final    BUN/Creatinine Ratio 01/11/2024 23 (H)  6 - 22 (calc) Final    Sodium 01/11/2024 140  135 - 146 mmol/L Final    Potassium 01/11/2024 4.4  3.5 - 5.3 mmol/L Final    Chloride 01/11/2024 102  98 - 110 mmol/L Final    CO2 01/11/2024 28  20 - 32 mmol/L Final    Calcium 01/11/2024 10.3  8.6 - 10.4 mg/dL Final    Total Protein 01/11/2024 6.9  6.1 - 8.1 g/dL Final    Albumin 01/11/2024 4.6  3.6 - 5.1 g/dL Final    Globulin, Total 01/11/2024 2.3  1.9 - 3.7 g/dL (calc) Final    Albumin/Globulin Ratio 01/11/2024 2.0  1.0 - 2.5 (calc) Final    Total Bilirubin 01/11/2024 0.8  0.2 - 1.2 mg/dL Final    Alkaline Phosphatase 01/11/2024 67  37 - 153 U/L Final    AST 01/11/2024 22  10 - 35 U/L Final    ALT 01/11/2024 27  6 - 29 U/L Final    Cholesterol 01/11/2024 203 (H)  <200 mg/dL Final    HDL 01/11/2024 50  > OR = 50 mg/dL Final    Triglycerides 01/11/2024 273 (H)  <150 mg/dL Final    LDL Cholesterol 01/11/2024 114 (H)  mg/dL (calc) Final    HDL/Cholesterol Ratio 01/11/2024 4.1  <5.0 (calc) Final    Non HDL Chol. (LDL+VLDL) 01/11/2024 153 (H)  <130 mg/dL (calc) Final    TSH w/reflex to FT4 01/11/2024 0.63  0.40 - 4.50 mIU/L Final    Creatinine, Urine 01/11/2024 29  20 - 275 mg/dL Final    Microalb, Ur 01/11/2024 0.6  See Note: mg/dL Final    Microalb/Creat Ratio 01/11/2024 21  <30 mcg/mg creat Final    Color, UA 01/11/2024 YELLOW  YELLOW Final    Appearance, UA 01/11/2024 CLEAR  CLEAR Final    Specific Gravity, UA 01/11/2024 1.008  1.001 - 1.035 Final    pH, UA 01/11/2024 8.0  5.0 - 8.0 Final    Glucose, UA 01/11/2024 NEGATIVE  NEGATIVE Final    Bilirubin, UA 01/11/2024 NEGATIVE  NEGATIVE Final    Ketones, UA 01/11/2024 NEGATIVE  NEGATIVE Final    Occult Blood UA 01/11/2024 NEGATIVE  NEGATIVE Final    Protein, UA 01/11/2024 NEGATIVE  NEGATIVE Final    Nitrite, UA 01/11/2024 NEGATIVE  NEGATIVE Final    Leukocytes, UA 01/11/2024 1+ (A)  NEGATIVE Final    WBC Casts, UA 01/11/2024 NONE SEEN  <  OR = 5 /HPF Final    RBC Casts, UA 01/11/2024 NONE SEEN  < OR = 2 /HPF Final    Squam Epithel, UA 01/11/2024 0-5  < OR = 5 /HPF Final    Bacteria, UA 01/11/2024 NONE SEEN  NONE SEEN /HPF Final    Hyaline Casts, UA 01/11/2024 NONE SEEN  NONE SEEN /LPF Final    Service Cmt: 01/11/2024    Final    Reflexive Urine Culture 01/11/2024    Final    Urine Culture, Routine 01/11/2024    Final       Past Medical History:   Diagnosis Date    Anxiety     Depression     Diabetes mellitus, type 2     GERD (gastroesophageal reflux disease)     Hyperlipidemia     Hypertension      Social History     Socioeconomic History    Marital status:    Tobacco Use    Smoking status: Former    Smokeless tobacco: Never   Substance and Sexual Activity    Alcohol use: No    Drug use: No    Sexual activity: Yes     Social Determinants of Health     Financial Resource Strain: Low Risk  (9/8/2020)    Overall Financial Resource Strain (CARDIA)     Difficulty of Paying Living Expenses: Not very hard   Transportation Needs: No Transportation Needs (9/8/2020)    PRAPARE - Transportation     Lack of Transportation (Medical): No     Lack of Transportation (Non-Medical): No   Stress: Stress Concern Present (9/8/2020)    [employer] [place] [employer] - Occupational Stress Questionnaire     Feeling of Stress : To some extent     Past Surgical History:   Procedure Laterality Date    BREAST BIOPSY Left 1990'S    BENIGN    HYSTERECTOMY      INCONTINENCE SURGERY       Family History   Problem Relation Name Age of Onset    Diabetes Maternal Grandmother      Hypertension Maternal Grandmother      Diabetes Father      Breast cancer Mother  57    Hypertension Mother      Diabetes Sister      Ovarian cancer Neg Hx         The 10-year CVD risk score (CAITLIN'Rylie, et al., 2008) is: 34.1%    Values used to calculate the score:      Age: 73 years      Sex: Female      Diabetic: Yes      Tobacco smoker: No      Systolic Blood Pressure: 142 mmHg      Is BP  treated: Yes      HDL Cholesterol: 50 mg/dL      Total Cholesterol: 203 mg/dL    Tests to Keep You Healthy    Mammogram: Met on 11/30/2023  Eye Exam: Met on 8/22/2023  Colon Cancer Screening: Met on 9/21/2017  Last Blood Pressure <= 139/89 (7/3/2024): NO  Last HbA1c < 8 (04/25/2024): Yes      Review of patient's allergies indicates:   Allergen Reactions    Flexeril [cyclobenzaprine] Other (See Comments)     Unknown was a long time ago.    Keflex [cephalexin] Diarrhea    Sulfa (sulfonamide antibiotics) Nausea Only    Augmentin [amoxicillin-pot clavulanate] Other (See Comments)     Gas       Current Outpatient Medications:     ALPRAZolam (XANAX) 1 MG tablet, Take 1 tablet (1 mg total) by mouth every evening., Disp: 30 tablet, Rfl: 2    atorvastatin (LIPITOR) 40 MG tablet, Take 1.5 tablets (60 mg total) by mouth once daily., Disp: 135 tablet, Rfl: 3    blood sugar diagnostic Strp, To check BG 1 times daily, to use with insurance preferred meter, Disp: 200 strip, Rfl: 1    blood-glucose meter kit, To check BG 1 times daily, to use with insurance preferred meter, Disp: 1 each, Rfl: 0    busPIRone (BUSPAR) 10 MG tablet, Take 1 tablet (10 mg total) by mouth 3 (three) times daily., Disp: 90 tablet, Rfl: 11    calcium carbonate/vitamin D3 (CALTRATE 600 PLUS D ORAL), Caltrate 600 plus D, Disp: , Rfl:     clindamycin (CLEOCIN) 300 MG capsule, Take 1 capsule (300 mg total) by mouth 3 (three) times daily., Disp: 30 capsule, Rfl: 0    famotidine (PEPCID) 40 MG tablet, Take 1 tablet (40 mg total) by mouth once daily., Disp: 30 tablet, Rfl: 11    glipiZIDE (GLUCOTROL) 5 MG tablet, Take 1 tablet (5 mg total) by mouth daily with breakfast., Disp: 30 tablet, Rfl: 2    hydrALAZINE (APRESOLINE) 25 MG tablet, Take 1 tablet (25 mg total) by mouth 2 (two) times a day., Disp: 180 tablet, Rfl: 3    hydroCHLOROthiazide (HYDRODIURIL) 12.5 MG Tab, Take 2 tablets (25 mg total) by mouth once daily., Disp: 180 tablet, Rfl: 3     "HYDROcodone-acetaminophen (NORCO)  mg per tablet, Take 1 tablet by mouth every 6 (six) hours as needed for Pain., Disp: , Rfl:     Lactobacillus rhamnosus GG (CULTURELLE) 10 billion cell capsule, Take 1 capsule by mouth once daily. (Patient not taking: Reported on 4/25/2024), Disp: , Rfl:     lancets 33 gauge Misc, 1 lancet by Misc.(Non-Drug; Combo Route) route 3 (three) times a week., Disp: 100 each, Rfl: 3    lancets Misc, To check BG 1 times daily, to use with insurance preferred meter, Disp: 200 each, Rfl: 1    losartan (COZAAR) 100 MG tablet, Take 1 tablet (100 mg total) by mouth once daily., Disp: 90 tablet, Rfl: 0    melatonin 5 mg Cap, Take 10 mg by mouth., Disp: , Rfl:     meloxicam (MOBIC) 15 MG tablet, Take 1 tablet (15 mg total) by mouth once daily., Disp: 90 tablet, Rfl: 1    metFORMIN (GLUCOPHAGE) 500 MG tablet, Take 1 tablet (500 mg total) by mouth 2 (two) times daily., Disp: 180 tablet, Rfl: 3    omega 3-dha-epa-fish oil 100-160-1,000 mg Cap, Fish Oil (Patient not taking: Reported on 4/25/2024), Disp: , Rfl:     ondansetron (ZOFRAN-ODT) 4 MG TbDL, Take 1 tablet (4 mg total) by mouth every 8 (eight) hours as needed., Disp: 30 tablet, Rfl: 0    pantoprazole (PROTONIX) 40 MG tablet, Take 1 tablet (40 mg total) by mouth once daily., Disp: 90 tablet, Rfl: 3    potassium chloride (MICRO-K) 10 MEQ CpSR, Take 1 capsule (10 mEq total) by mouth once daily., Disp: 90 capsule, Rfl: 3    SHINGRIX, PF, 50 mcg/0.5 mL injection, , Disp: , Rfl:     tretinoin (RETIN-A) 0.05 % cream, Apply topically every evening., Disp: 20 g, Rfl: 3    triamcinolone acetonide 0.1% (KENALOG) 0.1 % cream, Apply topically 2 (two) times daily., Disp: 80 g, Rfl: 1    Review of Systems        Objective:      Vitals:    07/03/24 1043   BP: (!) 142/70   Pulse: 78   SpO2: 96%   Weight: 64.4 kg (142 lb)   Height: 5' 3" (1.6 m)     Physical Exam  Vitals and nursing note reviewed.   Constitutional:       Appearance: She is well-developed. "   HENT:      Head: Normocephalic and atraumatic.      Right Ear: External ear normal.      Left Ear: External ear normal.      Nose: Nose normal.   Eyes:      Pupils: Pupils are equal, round, and reactive to light.   Neck:      Thyroid: No thyromegaly.      Vascular: No carotid bruit.   Cardiovascular:      Rate and Rhythm: Normal rate and regular rhythm.      Heart sounds: Normal heart sounds. No murmur heard.  Pulmonary:      Effort: Pulmonary effort is normal.      Breath sounds: Normal breath sounds. No wheezing or rales.   Abdominal:      General: Bowel sounds are normal. There is no distension.      Palpations: Abdomen is soft.      Tenderness: There is no abdominal tenderness.   Musculoskeletal:         General: No tenderness or deformity. Normal range of motion.      Cervical back: Normal range of motion and neck supple.      Lumbar back: Normal. No spasms.      Comments: Bends 90 degrees at  waist, right hand can open and close in a fist easily.   Lymphadenopathy:      Cervical: No cervical adenopathy.   Skin:     General: Skin is warm and dry.      Findings: No rash.      Comments: Has  redness and swelling around the nail bed with the right little finger, there is streaking up the medial part of the finger towards her MCP joint   Neurological:      Mental Status: She is alert and oriented to person, place, and time.      Cranial Nerves: No cranial nerve deficit.      Coordination: Coordination normal.   Psychiatric:         Behavior: Behavior normal.         Thought Content: Thought content normal.         Judgment: Judgment normal.           Assessment:       1. Paronychia of finger of right hand    2. Primary hypertension    3. Uncontrolled type 2 diabetes mellitus with hyperglycemia         Plan:       Paronychia of finger of right hand  -     clindamycin (CLEOCIN) 300 MG capsule; Take 1 capsule (300 mg total) by mouth 3 (three) times daily.  Dispense: 30 capsule; Refill: 0  To cover staph and strep,  will add clindamycin 300 mg t.i.d.  Primary hypertension  Blood pressure well controlled  Uncontrolled type 2 diabetes mellitus with hyperglycemia  A1c 5.9    No follow-ups on file.        7/7/2024 Philip Juarez

## 2024-07-25 ENCOUNTER — TELEPHONE (OUTPATIENT)
Dept: FAMILY MEDICINE | Facility: CLINIC | Age: 74
End: 2024-07-25
Payer: MEDICARE

## 2024-07-25 DIAGNOSIS — Z79.899 ENCOUNTER FOR LONG-TERM (CURRENT) USE OF OTHER MEDICATIONS: Primary | ICD-10-CM

## 2024-07-25 DIAGNOSIS — E78.5 HYPERLIPIDEMIA, UNSPECIFIED HYPERLIPIDEMIA TYPE: ICD-10-CM

## 2024-07-25 DIAGNOSIS — I10 HYPERTENSION, UNSPECIFIED TYPE: ICD-10-CM

## 2024-08-08 ENCOUNTER — OFFICE VISIT (OUTPATIENT)
Dept: FAMILY MEDICINE | Facility: CLINIC | Age: 74
End: 2024-08-08
Payer: MEDICARE

## 2024-08-08 VITALS
HEIGHT: 63 IN | WEIGHT: 143.25 LBS | HEART RATE: 78 BPM | SYSTOLIC BLOOD PRESSURE: 124 MMHG | DIASTOLIC BLOOD PRESSURE: 76 MMHG | BODY MASS INDEX: 25.38 KG/M2 | OXYGEN SATURATION: 95 %

## 2024-08-08 DIAGNOSIS — I10 ESSENTIAL HYPERTENSION: ICD-10-CM

## 2024-08-08 DIAGNOSIS — E11.65 UNCONTROLLED TYPE 2 DIABETES MELLITUS WITH HYPERGLYCEMIA: Primary | ICD-10-CM

## 2024-08-08 DIAGNOSIS — E78.5 HYPERLIPIDEMIA, UNSPECIFIED HYPERLIPIDEMIA TYPE: ICD-10-CM

## 2024-08-08 DIAGNOSIS — K21.9 GASTROESOPHAGEAL REFLUX DISEASE, UNSPECIFIED WHETHER ESOPHAGITIS PRESENT: ICD-10-CM

## 2024-08-08 DIAGNOSIS — F41.9 ANXIETY: ICD-10-CM

## 2024-08-08 DIAGNOSIS — Z79.899 ENCOUNTER FOR LONG-TERM (CURRENT) USE OF OTHER MEDICATIONS: ICD-10-CM

## 2024-08-08 DIAGNOSIS — Z51.81 ENCOUNTER FOR THERAPEUTIC DRUG MONITORING: ICD-10-CM

## 2024-08-08 LAB — HBA1C MFR BLD: 6.2 %

## 2024-08-08 PROCEDURE — 3044F HG A1C LEVEL LT 7.0%: CPT | Mod: CPTII,S$GLB,, | Performed by: NURSE PRACTITIONER

## 2024-08-08 PROCEDURE — 3008F BODY MASS INDEX DOCD: CPT | Mod: CPTII,S$GLB,, | Performed by: NURSE PRACTITIONER

## 2024-08-08 PROCEDURE — 3078F DIAST BP <80 MM HG: CPT | Mod: CPTII,S$GLB,, | Performed by: NURSE PRACTITIONER

## 2024-08-08 PROCEDURE — 3074F SYST BP LT 130 MM HG: CPT | Mod: CPTII,S$GLB,, | Performed by: NURSE PRACTITIONER

## 2024-08-08 PROCEDURE — 83036 HEMOGLOBIN GLYCOSYLATED A1C: CPT | Mod: QW,,, | Performed by: NURSE PRACTITIONER

## 2024-08-08 PROCEDURE — 3066F NEPHROPATHY DOC TX: CPT | Mod: CPTII,S$GLB,, | Performed by: NURSE PRACTITIONER

## 2024-08-08 PROCEDURE — 4010F ACE/ARB THERAPY RXD/TAKEN: CPT | Mod: CPTII,S$GLB,, | Performed by: NURSE PRACTITIONER

## 2024-08-08 PROCEDURE — 1126F AMNT PAIN NOTED NONE PRSNT: CPT | Mod: CPTII,S$GLB,, | Performed by: NURSE PRACTITIONER

## 2024-08-08 PROCEDURE — 1101F PT FALLS ASSESS-DOCD LE1/YR: CPT | Mod: CPTII,S$GLB,, | Performed by: NURSE PRACTITIONER

## 2024-08-08 PROCEDURE — 1159F MED LIST DOCD IN RCRD: CPT | Mod: CPTII,S$GLB,, | Performed by: NURSE PRACTITIONER

## 2024-08-08 PROCEDURE — 3288F FALL RISK ASSESSMENT DOCD: CPT | Mod: CPTII,S$GLB,, | Performed by: NURSE PRACTITIONER

## 2024-08-08 PROCEDURE — 1160F RVW MEDS BY RX/DR IN RCRD: CPT | Mod: CPTII,S$GLB,, | Performed by: NURSE PRACTITIONER

## 2024-08-08 PROCEDURE — 3061F NEG MICROALBUMINURIA REV: CPT | Mod: CPTII,S$GLB,, | Performed by: NURSE PRACTITIONER

## 2024-08-08 PROCEDURE — 99214 OFFICE O/P EST MOD 30 MIN: CPT | Mod: S$GLB,,, | Performed by: NURSE PRACTITIONER

## 2024-08-08 RX ORDER — ATORVASTATIN CALCIUM 40 MG/1
60 TABLET, FILM COATED ORAL DAILY
Qty: 135 TABLET | Refills: 3 | Status: CANCELLED | OUTPATIENT
Start: 2024-08-08

## 2024-08-08 RX ORDER — ROSUVASTATIN CALCIUM 40 MG/1
40 TABLET, COATED ORAL NIGHTLY
Qty: 90 TABLET | Refills: 3 | Status: SHIPPED | OUTPATIENT
Start: 2024-08-08 | End: 2025-08-08

## 2024-08-08 RX ORDER — FAMOTIDINE 40 MG/1
40 TABLET, FILM COATED ORAL DAILY
Qty: 90 TABLET | Refills: 1 | Status: SHIPPED | OUTPATIENT
Start: 2024-08-08 | End: 2025-08-08

## 2024-08-08 RX ORDER — LOSARTAN POTASSIUM 100 MG/1
100 TABLET ORAL DAILY
Qty: 90 TABLET | Refills: 0 | Status: SHIPPED | OUTPATIENT
Start: 2024-08-08 | End: 2024-11-06

## 2024-08-08 RX ORDER — GLIPIZIDE 5 MG/1
5 TABLET ORAL
Qty: 90 TABLET | Refills: 1 | Status: SHIPPED | OUTPATIENT
Start: 2024-08-08 | End: 2025-08-08

## 2024-08-08 NOTE — PROGRESS NOTES
SUBJECTIVE:    Patient ID: Kay Valencia is a 73 y.o. female.    Chief Complaint: Follow-up (Bottles brought//refill will review//agrees foot//eye end of this month [facility] 20/20//-ERL)    73-year-old female presents for check up She is treated for diabetes, htn, oa, anxiety, insomnia, she is taking medications as prescribed. Today hga1c is 6.2mg/dl. she is watching diet more. Bp in office is well controlled. Taking meds as prescribed. Recently had labs. Wants to discuss. Ldl was 115mg/dl. Despite taking 60mg/ of lipitor. Sleeping ok. Active around home.     Follow-up  Pertinent negatives include no abdominal pain, arthralgias, chest pain, chills, congestion, coughing, fever, headaches, nausea, rash, sore throat, vomiting or weakness.       Office Visit on 08/08/2024   Component Date Value Ref Range Status    Hemoglobin A1C, POC 08/08/2024 6.2  % Final   Telephone on 07/25/2024   Component Date Value Ref Range Status    Glucose 08/01/2024 127 (H)  65 - 99 mg/dL Final    BUN 08/01/2024 31 (H)  7 - 25 mg/dL Final    Creatinine 08/01/2024 0.96  0.60 - 1.00 mg/dL Final    eGFR 08/01/2024 63  > OR = 60 mL/min/1.73m2 Final    BUN/Creatinine Ratio 08/01/2024 32 (H)  6 - 22 (calc) Final    Sodium 08/01/2024 137  135 - 146 mmol/L Final    Potassium 08/01/2024 4.3  3.5 - 5.3 mmol/L Final    Chloride 08/01/2024 100  98 - 110 mmol/L Final    CO2 08/01/2024 27  20 - 32 mmol/L Final    Calcium 08/01/2024 9.9  8.6 - 10.4 mg/dL Final    Total Protein 08/01/2024 6.4  6.1 - 8.1 g/dL Final    Albumin 08/01/2024 4.4  3.6 - 5.1 g/dL Final    Globulin, Total 08/01/2024 2.0  1.9 - 3.7 g/dL (calc) Final    Albumin/Globulin Ratio 08/01/2024 2.2  1.0 - 2.5 (calc) Final    Total Bilirubin 08/01/2024 0.8  0.2 - 1.2 mg/dL Final    Alkaline Phosphatase 08/01/2024 76  37 - 153 U/L Final    AST 08/01/2024 18  10 - 35 U/L Final    ALT 08/01/2024 17  6 - 29 U/L Final    Cholesterol 08/01/2024 204 (H)  <200 mg/dL Final    HDL 08/01/2024 49 (L)  >  OR = 50 mg/dL Final    Triglycerides 08/01/2024 282 (H)  <150 mg/dL Final    LDL Cholesterol 08/01/2024 115 (H)  mg/dL (calc) Final    HDL/Cholesterol Ratio 08/01/2024 4.2  <5.0 (calc) Final    Non HDL Chol. (LDL+VLDL) 08/01/2024 155 (H)  <130 mg/dL (calc) Final   Office Visit on 04/25/2024   Component Date Value Ref Range Status    Hemoglobin A1C, POC 04/25/2024 5.9  % Final       Past Medical History:   Diagnosis Date    Anxiety     Depression     Diabetes mellitus, type 2     GERD (gastroesophageal reflux disease)     Hyperlipidemia     Hypertension      Social History     Socioeconomic History    Marital status:    Tobacco Use    Smoking status: Former    Smokeless tobacco: Never   Substance and Sexual Activity    Alcohol use: No    Drug use: No    Sexual activity: Yes     Social Determinants of Health     Financial Resource Strain: Low Risk  (9/8/2020)    Overall Financial Resource Strain (CARDIA)     Difficulty of Paying Living Expenses: Not very hard   Transportation Needs: No Transportation Needs (9/8/2020)    PRAPARE - Transportation     Lack of Transportation (Medical): No     Lack of Transportation (Non-Medical): No   Stress: Stress Concern Present (9/8/2020)    [employer] [place] [employer] - Occupational Stress Questionnaire     Feeling of Stress : To some extent     Past Surgical History:   Procedure Laterality Date    BREAST BIOPSY Left 1990'S    BENIGN    HYSTERECTOMY      INCONTINENCE SURGERY       Family History   Problem Relation Name Age of Onset    Diabetes Maternal Grandmother      Hypertension Maternal Grandmother      Diabetes Father      Breast cancer Mother  57    Hypertension Mother      Diabetes Sister      Ovarian cancer Neg Hx         All of your core healthy metrics are met.      Review of patient's allergies indicates:   Allergen Reactions    Flexeril [cyclobenzaprine] Other (See Comments)     Unknown was a long time ago.    Keflex [cephalexin] Diarrhea    Sulfa  (sulfonamide antibiotics) Nausea Only    Augmentin [amoxicillin-pot clavulanate] Other (See Comments)     Gas       Current Outpatient Medications:     ALPRAZolam (XANAX) 1 MG tablet, Take 1 tablet (1 mg total) by mouth every evening., Disp: 30 tablet, Rfl: 2    blood sugar diagnostic Strp, To check BG 1 times daily, to use with insurance preferred meter, Disp: 200 strip, Rfl: 1    calcium carbonate/vitamin D3 (CALTRATE 600 PLUS D ORAL), Caltrate 600 plus D, Disp: , Rfl:     hydrALAZINE (APRESOLINE) 25 MG tablet, Take 1 tablet (25 mg total) by mouth 2 (two) times a day., Disp: 180 tablet, Rfl: 3    hydroCHLOROthiazide (HYDRODIURIL) 12.5 MG Tab, Take 2 tablets (25 mg total) by mouth once daily., Disp: 180 tablet, Rfl: 3    HYDROcodone-acetaminophen (NORCO)  mg per tablet, Take 1 tablet by mouth every 6 (six) hours as needed for Pain., Disp: , Rfl:     lancets 33 gauge Misc, 1 lancet by Misc.(Non-Drug; Combo Route) route 3 (three) times a week., Disp: 100 each, Rfl: 3    lancets Misc, To check BG 1 times daily, to use with insurance preferred meter, Disp: 200 each, Rfl: 1    melatonin 5 mg Cap, Take 10 mg by mouth., Disp: , Rfl:     meloxicam (MOBIC) 15 MG tablet, Take 1 tablet (15 mg total) by mouth once daily., Disp: 90 tablet, Rfl: 1    ondansetron (ZOFRAN-ODT) 4 MG TbDL, Take 1 tablet (4 mg total) by mouth every 8 (eight) hours as needed., Disp: 30 tablet, Rfl: 0    pantoprazole (PROTONIX) 40 MG tablet, Take 1 tablet (40 mg total) by mouth once daily., Disp: 90 tablet, Rfl: 3    potassium chloride (MICRO-K) 10 MEQ CpSR, Take 1 capsule (10 mEq total) by mouth once daily., Disp: 90 capsule, Rfl: 3    triamcinolone acetonide 0.1% (KENALOG) 0.1 % cream, Apply topically 2 (two) times daily., Disp: 80 g, Rfl: 1    blood-glucose meter kit, To check BG 1 times daily, to use with insurance preferred meter, Disp: 1 each, Rfl: 0    famotidine (PEPCID) 40 MG tablet, Take 1 tablet (40 mg total) by mouth once daily.,  "Disp: 90 tablet, Rfl: 1    glipiZIDE (GLUCOTROL) 5 MG tablet, Take 1 tablet (5 mg total) by mouth daily with breakfast., Disp: 90 tablet, Rfl: 1    Lactobacillus rhamnosus GG (CULTURELLE) 10 billion cell capsule, Take 1 capsule by mouth once daily. (Patient not taking: Reported on 4/25/2024), Disp: , Rfl:     losartan (COZAAR) 100 MG tablet, Take 1 tablet (100 mg total) by mouth once daily., Disp: 90 tablet, Rfl: 0    metFORMIN (GLUCOPHAGE) 500 MG tablet, Take 1 tablet (500 mg total) by mouth 2 (two) times daily., Disp: 180 tablet, Rfl: 3    rosuvastatin (CRESTOR) 40 MG Tab, Take 1 tablet (40 mg total) by mouth every evening., Disp: 90 tablet, Rfl: 3    tretinoin (RETIN-A) 0.05 % cream, Apply topically every evening. (Patient not taking: Reported on 8/8/2024), Disp: 20 g, Rfl: 3    Review of Systems   Constitutional:  Negative for chills, fever and unexpected weight change.   HENT:  Negative for congestion, ear pain, rhinorrhea and sore throat.    Eyes:  Negative for pain and visual disturbance.   Respiratory:  Negative for cough and shortness of breath.    Cardiovascular:  Negative for chest pain, palpitations and leg swelling.   Gastrointestinal:  Negative for abdominal pain, diarrhea, nausea and vomiting.   Genitourinary:  Negative for difficulty urinating, hematuria and vaginal bleeding.   Musculoskeletal:  Negative for arthralgias.   Skin:  Negative for rash.   Neurological:  Negative for dizziness, weakness and headaches.   Psychiatric/Behavioral:  Negative for agitation and sleep disturbance. The patient is not nervous/anxious.           Objective:      Vitals:    08/08/24 1342   BP: 124/76   Pulse: 78   SpO2: 95%   Weight: 65 kg (143 lb 4 oz)   Height: 5' 2.5" (1.588 m)     Physical Exam  Constitutional:       General: She is not in acute distress.     Appearance: Normal appearance. She is well-developed and normal weight.   HENT:      Head: Normocephalic.      Right Ear: External ear normal.      Left Ear: " External ear normal.   Eyes:      Conjunctiva/sclera: Conjunctivae normal.      Pupils: Pupils are equal, round, and reactive to light.   Neck:      Vascular: No JVD.   Cardiovascular:      Rate and Rhythm: Normal rate and regular rhythm.      Heart sounds: No murmur heard.  Pulmonary:      Effort: Pulmonary effort is normal.      Breath sounds: Normal breath sounds.   Abdominal:      General: Bowel sounds are normal.      Palpations: Abdomen is soft.   Musculoskeletal:         General: No deformity. Normal range of motion.      Cervical back: Normal range of motion and neck supple.   Feet:      Right foot:      Protective Sensation: 5 sites tested.  5 sites sensed.      Left foot:      Protective Sensation: 5 sites tested.  5 sites sensed.   Lymphadenopathy:      Cervical: No cervical adenopathy.   Skin:     General: Skin is warm and dry.      Findings: No rash.   Neurological:      Mental Status: She is alert and oriented to person, place, and time.      Gait: Gait normal.   Psychiatric:         Speech: Speech normal.         Behavior: Behavior normal.           Assessment:       1. Uncontrolled type 2 diabetes mellitus with hyperglycemia    2. Hyperlipidemia, unspecified hyperlipidemia type    3. Encounter for long-term (current) use of other medications    4. Encounter for therapeutic drug monitoring    5. Anxiety    6. Essential hypertension    7. Gastroesophageal reflux disease, unspecified whether esophagitis present         Plan:       Uncontrolled type 2 diabetes mellitus with hyperglycemia  Comments:  hga1c 6.2  Orders:  -     POCT HEMOGLOBIN A1C  -     Foot Exam Performed    Hyperlipidemia, unspecified hyperlipidemia type  Comments:  try crestor 40. ldl remains elevated despite lipitor 60    Encounter for long-term (current) use of other medications  -     DRUG MONITOR, PANEL 4, W/CONF, URINE; Future; Expected date: 08/08/2024    Encounter for therapeutic drug monitoring  -     DRUG MONITOR, PANEL 4,  W/CONF, URINE; Future; Expected date: 08/08/2024    Anxiety  Comments:  xanax prn  Orders:  -     DRUG MONITOR, PANEL 4, W/CONF, URINE; Future; Expected date: 08/08/2024    Essential hypertension  Comments:  bp is controlled  Orders:  -     losartan (COZAAR) 100 MG tablet; Take 1 tablet (100 mg total) by mouth once daily.  Dispense: 90 tablet; Refill: 0    Gastroesophageal reflux disease, unspecified whether esophagitis present  Comments:  protonix /pepcid    Other orders  -     famotidine (PEPCID) 40 MG tablet; Take 1 tablet (40 mg total) by mouth once daily.  Dispense: 90 tablet; Refill: 1  -     glipiZIDE (GLUCOTROL) 5 MG tablet; Take 1 tablet (5 mg total) by mouth daily with breakfast.  Dispense: 90 tablet; Refill: 1  -     rosuvastatin (CRESTOR) 40 MG Tab; Take 1 tablet (40 mg total) by mouth every evening.  Dispense: 90 tablet; Refill: 3      Follow up in about 3 months (around 11/8/2024).        8/14/2024 Karolina Ramirez

## 2024-08-26 ENCOUNTER — TELEPHONE (OUTPATIENT)
Dept: FAMILY MEDICINE | Facility: CLINIC | Age: 74
End: 2024-08-26
Payer: MEDICARE

## 2024-08-26 NOTE — TELEPHONE ENCOUNTER
----- Message from Azul Vivas sent at 8/26/2024 10:50 AM CDT -----  Pt legs have been killing her, stomach upset. Thinks it is from the rosuvastatin since that was just changed. Would like to know what to do   [phone]

## 2024-08-27 LAB
LEFT EYE DM RETINOPATHY: POSITIVE
RIGHT EYE DM RETINOPATHY: POSITIVE

## 2024-08-29 ENCOUNTER — PATIENT OUTREACH (OUTPATIENT)
Dept: ADMINISTRATIVE | Facility: HOSPITAL | Age: 74
End: 2024-08-29
Payer: MEDICARE

## 2024-08-29 RX ORDER — ATORVASTATIN CALCIUM 80 MG/1
80 TABLET, FILM COATED ORAL DAILY
Qty: 90 TABLET | Refills: 3 | Status: SHIPPED | OUTPATIENT
Start: 2024-08-29 | End: 2025-08-29

## 2024-08-29 RX ORDER — ATORVASTATIN CALCIUM 80 MG/1
80 TABLET, FILM COATED ORAL DAILY
Qty: 90 TABLET | Refills: 3 | Status: SHIPPED | OUTPATIENT
Start: 2024-08-29 | End: 2024-08-29 | Stop reason: SDUPTHER

## 2024-08-29 NOTE — TELEPHONE ENCOUNTER
----- Message from Kimberly Fishman sent at 8/29/2024 10:40 AM CDT -----  Pt is feeling a little better and would like to know which cholesterol medication she needs to be on.  [phone]

## 2024-08-29 NOTE — TELEPHONE ENCOUNTER
Patient states she is feeling better, states the statin give her pain in her legs. Now patient is constipated, has not tried anything over the counter but will be going to the store to  medication.   Patient is asking what new cholesterol prescription Nurse Practitioner James would like her to start.  [facility] in [place]

## 2024-08-29 NOTE — PROGRESS NOTES
Population Health Chart Review & Patient Outreach Details      Additional [facility] Health Notes:               Updates Requested / Reviewed:      Updated Care Coordination Note, , and Care Team Updated         Health Maintenance Topics Overdue:      [facility] Score: 1     Eye Exam    Tetanus Vaccine  Shingles/Zoster Vaccine  RSV Vaccine                  Health Maintenance Topic(s) Outreach Outcomes & Actions Taken:    Eye Exam - Outreach Outcomes & Actions Taken  : Diabetic Eye External Records Uploaded, Care Team & History Updated if Applicable

## 2024-08-29 NOTE — TELEPHONE ENCOUNTER
Patient notified of prescription being sent in.   Reports she has been feeling pressure in vaginal area, abdominal and chills, started this morning. No fever present. Patient has been constipated.

## 2024-08-31 ENCOUNTER — OFFICE VISIT (OUTPATIENT)
Dept: URGENT CARE | Facility: CLINIC | Age: 74
End: 2024-08-31
Payer: MEDICARE

## 2024-08-31 VITALS
HEART RATE: 87 BPM | HEIGHT: 63 IN | BODY MASS INDEX: 25.34 KG/M2 | SYSTOLIC BLOOD PRESSURE: 144 MMHG | TEMPERATURE: 98 F | WEIGHT: 143 LBS | RESPIRATION RATE: 16 BRPM | DIASTOLIC BLOOD PRESSURE: 70 MMHG | OXYGEN SATURATION: 92 %

## 2024-08-31 DIAGNOSIS — N30.00 ACUTE CYSTITIS WITHOUT HEMATURIA: Primary | ICD-10-CM

## 2024-08-31 PROCEDURE — 99204 OFFICE O/P NEW MOD 45 MIN: CPT | Mod: S$GLB,,, | Performed by: STUDENT IN AN ORGANIZED HEALTH CARE EDUCATION/TRAINING PROGRAM

## 2024-08-31 RX ORDER — NITROFURANTOIN 25; 75 MG/1; MG/1
100 CAPSULE ORAL 2 TIMES DAILY
Qty: 14 CAPSULE | Refills: 0 | Status: SHIPPED | OUTPATIENT
Start: 2024-08-31 | End: 2024-09-07

## 2024-08-31 NOTE — PATIENT INSTRUCTIONS
Thankyou for the opportunity to care for you today.  Please take all medications as directed, continue any previous prescribed medications unless we specifically discussed holding them.  If your symptoms do not resolve or worsen please return to the clinic for re-evaluation, if your situation becomes emergent please present to to the nearest emergency department.  Follow-up with your PCP for continued evaluation and management.     no

## 2024-08-31 NOTE — PROGRESS NOTES
"Subjective:      Patient ID: Kay Valencia is a 74 y.o. female.    Vitals:  height is 5' 2.5" (1.588 m) and weight is 64.9 kg (143 lb). Her temperature is 98.2 °F (36.8 °C). Her blood pressure is 144/70 (abnormal) and her pulse is 87. Her respiration is 16 and oxygen saturation is 92% (abnormal).     Chief Complaint: Abdominal Pain    Ambulatory to room with complaint of urinary frequency as well as bladder spasms.  Symptoms present for 3 days.    Abdominal Pain  This is a new problem. The current episode started in the past 7 days (x 2 days). The problem has been gradually worsening. The pain is located in the suprapubic region and periumbilical region. The pain is at a severity of 5/10. The quality of the pain is sharp. Associated symptoms include frequency. Pertinent negatives include no fever.       Constitution: Negative for fever.   Gastrointestinal:  Positive for abdominal pain.   Genitourinary:  Positive for frequency.        Positive for bladder spasm      Objective:     Physical Exam   Constitutional: She is oriented to person, place, and time. She appears well-developed.   HENT:   Head: Normocephalic and atraumatic.   Ears:   Right Ear: External ear normal.   Left Ear: External ear normal.   Nose: Nose normal.   Mouth/Throat: Mucous membranes are normal.   Eyes: Conjunctivae and lids are normal.   Neck: Trachea normal. Neck supple.   Cardiovascular: Normal rate, regular rhythm and normal heart sounds.   Pulmonary/Chest: Effort normal and breath sounds normal. No respiratory distress.   Abdominal: Normal appearance and bowel sounds are normal. She exhibits no distension and no mass. Soft. There is abdominal tenderness. There is no left CVA tenderness and no right CVA tenderness.      Comments: Suprapubic tenderness   Musculoskeletal: Normal range of motion.         General: Normal range of motion.   Neurological: She is alert and oriented to person, place, and time. She has normal strength.   Skin: Skin " is warm, dry, intact, not diaphoretic and not pale.   Psychiatric: Her speech is normal and behavior is normal. Judgment and thought content normal.   Nursing note and vitals reviewed.      Assessment:     1. Acute cystitis without hematuria        Plan:       Acute cystitis without hematuria  -     Urine culture    Other orders  -     nitrofurantoin, macrocrystal-monohydrate, (MACROBID) 100 MG capsule; Take 1 capsule (100 mg total) by mouth 2 (two) times daily. for 7 days  Dispense: 14 capsule; Refill: 0           UA remarkable for leukocyte esterase.  Empiric antibiotic.  Encourage clear fluid intake.  Follow up with urine culture.  Patient to follow up as needed at this clinic or primary.  Instructions given for when to present to the ED.  - To ED for any new or acutely worsening symptoms including but not limited to chest pain, palpitations, shortness of breath, or fever greater than 103° F.  Patient in agreement with plan of care.    - The diagnosis, treatment plan, instructions for follow-up and reevaluation as well as ED precautions were discussed and understanding was verbalized. All questions or concerns have been addressed.  -Follow up with your primary care provider for continued evaluation and management.

## 2024-09-04 LAB
BACTERIA UR CULT: NO GROWTH
BACTERIA UR CULT: NORMAL

## 2024-09-05 ENCOUNTER — TELEPHONE (OUTPATIENT)
Dept: FAMILY MEDICINE | Facility: CLINIC | Age: 74
End: 2024-09-05
Payer: MEDICARE

## 2024-09-05 NOTE — TELEPHONE ENCOUNTER
----- Message from Andra Gustafson MA sent at 9/5/2024  2:15 PM CDT -----  Patient went to the urgent care for a UTI on 8/31 and is almost finished the abx. States she is still not feeling well, having abdominal pain. Wants a call back to see if she can come in for an appointment early next week.     Patients call back number [phone]

## 2024-09-05 NOTE — TELEPHONE ENCOUNTER
Patient notified of message. Offered patient an appointment Monday morning with another NP in office, patient rather wait to see [facility]. Patient scheduled at [facility]'s only opening next week. Notified to seek emergent care if symptoms get worse.

## 2024-09-05 NOTE — TELEPHONE ENCOUNTER
Still having abdomen pain, pressure in vaginal area and chills. Tomorrow last day of antibiotics of macrobid.

## 2024-09-09 ENCOUNTER — TELEPHONE (OUTPATIENT)
Dept: FAMILY MEDICINE | Facility: CLINIC | Age: 74
End: 2024-09-09
Payer: MEDICARE

## 2024-09-09 NOTE — TELEPHONE ENCOUNTER
Pt has been scheduled    Attending Only I have personally seen and examined this patient.  I have fully participated in the care of this patient. I have reviewed all pertinent clinical information, including history, physical exam, plan and the Resident’s note and agree except as noted.

## 2024-09-09 NOTE — TELEPHONE ENCOUNTER
Spoke with patient, states her stomach is bothering her. Has been up all night. States it comes and goes for the last few weeks. States the pain is in the middle of her stomach. She has been burping, she thinks this is a gall bladder issue.

## 2024-09-09 NOTE — TELEPHONE ENCOUNTER
----- Message from Barbara Diaz sent at 9/9/2024 10:02 AM CDT -----  Pt needs to be seen asap. Stomach pain. Pt #[phone]

## 2024-09-10 ENCOUNTER — OFFICE VISIT (OUTPATIENT)
Dept: FAMILY MEDICINE | Facility: CLINIC | Age: 74
End: 2024-09-10
Payer: MEDICARE

## 2024-09-10 ENCOUNTER — HOSPITAL ENCOUNTER (OUTPATIENT)
Dept: RADIOLOGY | Facility: HOSPITAL | Age: 74
Discharge: HOME | End: 2024-09-10
Attending: NURSE PRACTITIONER
Payer: MEDICARE

## 2024-09-10 VITALS
WEIGHT: 139.63 LBS | HEART RATE: 118 BPM | BODY MASS INDEX: 24.74 KG/M2 | OXYGEN SATURATION: 98 % | HEIGHT: 63 IN | DIASTOLIC BLOOD PRESSURE: 60 MMHG | SYSTOLIC BLOOD PRESSURE: 130 MMHG

## 2024-09-10 DIAGNOSIS — E11.65 UNCONTROLLED TYPE 2 DIABETES MELLITUS WITH HYPERGLYCEMIA: ICD-10-CM

## 2024-09-10 DIAGNOSIS — Z12.39 ENCOUNTER FOR SCREENING FOR MALIGNANT NEOPLASM OF BREAST, UNSPECIFIED SCREENING MODALITY: Primary | ICD-10-CM

## 2024-09-10 DIAGNOSIS — R10.31 RIGHT LOWER QUADRANT PAIN: ICD-10-CM

## 2024-09-10 DIAGNOSIS — R11.10 VOMITING, UNSPECIFIED VOMITING TYPE, UNSPECIFIED WHETHER NAUSEA PRESENT: ICD-10-CM

## 2024-09-10 DIAGNOSIS — I10 PRIMARY HYPERTENSION: ICD-10-CM

## 2024-09-10 PROCEDURE — 3288F FALL RISK ASSESSMENT DOCD: CPT | Mod: CPTII,S$GLB,, | Performed by: NURSE PRACTITIONER

## 2024-09-10 PROCEDURE — 74178 CT ABD&PLV WO CNTR FLWD CNTR: CPT | Mod: 26,,, | Performed by: RADIOLOGY

## 2024-09-10 PROCEDURE — 99214 OFFICE O/P EST MOD 30 MIN: CPT | Mod: S$GLB,,, | Performed by: NURSE PRACTITIONER

## 2024-09-10 PROCEDURE — 1101F PT FALLS ASSESS-DOCD LE1/YR: CPT | Mod: CPTII,S$GLB,, | Performed by: NURSE PRACTITIONER

## 2024-09-10 PROCEDURE — 3044F HG A1C LEVEL LT 7.0%: CPT | Mod: CPTII,S$GLB,, | Performed by: NURSE PRACTITIONER

## 2024-09-10 PROCEDURE — 74178 CT ABD&PLV WO CNTR FLWD CNTR: CPT | Mod: TC

## 2024-09-10 PROCEDURE — 1159F MED LIST DOCD IN RCRD: CPT | Mod: CPTII,S$GLB,, | Performed by: NURSE PRACTITIONER

## 2024-09-10 PROCEDURE — 3075F SYST BP GE 130 - 139MM HG: CPT | Mod: CPTII,S$GLB,, | Performed by: NURSE PRACTITIONER

## 2024-09-10 PROCEDURE — 25500020 PHARM REV CODE 255: Performed by: NURSE PRACTITIONER

## 2024-09-10 PROCEDURE — 3066F NEPHROPATHY DOC TX: CPT | Mod: CPTII,S$GLB,, | Performed by: NURSE PRACTITIONER

## 2024-09-10 PROCEDURE — 3078F DIAST BP <80 MM HG: CPT | Mod: CPTII,S$GLB,, | Performed by: NURSE PRACTITIONER

## 2024-09-10 PROCEDURE — 3061F NEG MICROALBUMINURIA REV: CPT | Mod: CPTII,S$GLB,, | Performed by: NURSE PRACTITIONER

## 2024-09-10 PROCEDURE — 3008F BODY MASS INDEX DOCD: CPT | Mod: CPTII,S$GLB,, | Performed by: NURSE PRACTITIONER

## 2024-09-10 PROCEDURE — 4010F ACE/ARB THERAPY RXD/TAKEN: CPT | Mod: CPTII,S$GLB,, | Performed by: NURSE PRACTITIONER

## 2024-09-10 PROCEDURE — 1125F AMNT PAIN NOTED PAIN PRSNT: CPT | Mod: CPTII,S$GLB,, | Performed by: NURSE PRACTITIONER

## 2024-09-10 PROCEDURE — 1160F RVW MEDS BY RX/DR IN RCRD: CPT | Mod: CPTII,S$GLB,, | Performed by: NURSE PRACTITIONER

## 2024-09-10 RX ORDER — DICYCLOMINE HYDROCHLORIDE 10 MG/1
10 CAPSULE ORAL 3 TIMES DAILY PRN
Qty: 30 CAPSULE | Refills: 0 | Status: SHIPPED | OUTPATIENT
Start: 2024-09-10 | End: 2024-10-10

## 2024-09-10 RX ORDER — CIPROFLOXACIN 500 MG/1
500 TABLET ORAL 2 TIMES DAILY
Qty: 20 TABLET | Refills: 0 | Status: SHIPPED | OUTPATIENT
Start: 2024-09-10 | End: 2024-09-20

## 2024-09-10 RX ORDER — METRONIDAZOLE 500 MG/1
500 TABLET ORAL 3 TIMES DAILY
Qty: 21 TABLET | Refills: 0 | Status: SHIPPED | OUTPATIENT
Start: 2024-09-10 | End: 2024-09-17

## 2024-09-10 RX ORDER — DICYCLOMINE HYDROCHLORIDE 10 MG/1
10 CAPSULE ORAL 3 TIMES DAILY PRN
Qty: 30 CAPSULE | Refills: 0 | Status: SHIPPED | OUTPATIENT
Start: 2024-09-10 | End: 2024-09-10 | Stop reason: SDUPTHER

## 2024-09-10 RX ORDER — CIPROFLOXACIN 500 MG/1
500 TABLET ORAL 2 TIMES DAILY
Qty: 20 TABLET | Refills: 0 | Status: SHIPPED | OUTPATIENT
Start: 2024-09-10 | End: 2024-09-10 | Stop reason: SDUPTHER

## 2024-09-10 RX ORDER — METRONIDAZOLE 500 MG/1
500 TABLET ORAL 3 TIMES DAILY
Qty: 21 TABLET | Refills: 0 | Status: SHIPPED | OUTPATIENT
Start: 2024-09-10 | End: 2024-09-10 | Stop reason: SDUPTHER

## 2024-09-10 RX ADMIN — IOHEXOL 100 ML: 350 INJECTION, SOLUTION INTRAVENOUS at 05:09

## 2024-09-16 ENCOUNTER — TELEPHONE (OUTPATIENT)
Dept: FAMILY MEDICINE | Facility: CLINIC | Age: 74
End: 2024-09-16
Payer: MEDICARE

## 2024-09-16 DIAGNOSIS — R93.5 ABNORMAL CT OF THE ABDOMEN: Primary | ICD-10-CM

## 2024-09-16 DIAGNOSIS — R10.9 ABDOMINAL PAIN, UNSPECIFIED ABDOMINAL LOCATION: ICD-10-CM

## 2024-09-16 NOTE — TELEPHONE ENCOUNTER
----- Message from Olga Kyle sent at 9/16/2024  2:23 PM CDT -----  The patient saw Karol[facility] last week. The medicine she put her on makes her very sick. She thinks its the Ciprofloxacin. She is not sure Please call. Pt's # [phone]

## 2024-09-16 NOTE — TELEPHONE ENCOUNTER
Spoke to pt and she stated the Cipro made her worse. Pt stated she stopped taking it. She feels better. Still has diarrhea but no more abdominal pain. Still is taking the doxycycline. She just wanted to let [facility]ol[facility] know

## 2024-09-29 NOTE — PROGRESS NOTES
SUBJECTIVE:    Patient ID: Kay Valencia is a 74 y.o. female.    Chief Complaint: Abdominal Pain (No bottles//Pt is here for stomach pain x a couple of weeks, stomach has been hard in the middle of her abd, has not wanted to eat for a couple of days, bowel movements have been soft and slimy and she has been having them a lot since the UTI on 8/31/24//KE)    74-year-old female presents for urgent visit She is treated for diabetes, htn, oa, anxiety, insomnia, she is taking medications as prescribed. Today hga1c is 6.2mg/dl. she is watching diet more. Bp in office is well controlled. Today is reporting ongoing abdominal pain. Reports that was seen at urgent care for uti. Feels like pain has been intermittent since then. Pain with urination has resolved. Does not radiate. Decreased appetite. Some diarrhea. No vomiting. No pain with urination.     Abdominal Pain  Associated symptoms include diarrhea. Pertinent negatives include no arthralgias, fever, headaches, hematuria, nausea or vomiting.   Follow-up  Associated symptoms include abdominal pain. Pertinent negatives include no arthralgias, chest pain, chills, congestion, coughing, fever, headaches, nausea, rash, sore throat, vomiting or weakness.       Lab Visit on 09/10/2024   Component Date Value Ref Range Status    Creatinine 09/10/2024 1.4  0.5 - 1.4 mg/dL Final    eGFR 09/10/2024 39.7 (A)  >60 mL/min/1.73 m^2 Final   Office Visit on 08/31/2024   Component Date Value Ref Range Status    Urine Culture, Routine 08/31/2024 Final report   Final    Result 1 08/31/2024 No growth   Final   Patient Outreach on 08/29/2024   Component Date Value Ref Range Status    Left Eye DM Retinopathy 08/27/2024 Positive   Final    Right Eye DM Retinopathy 08/27/2024 Positive   Final   Office Visit on 08/08/2024   Component Date Value Ref Range Status    Hemoglobin A1C, POC 08/08/2024 6.2  % Final   Telephone on 07/25/2024   Component Date Value Ref Range Status    Glucose 08/01/2024  127 (H)  65 - 99 mg/dL Final    BUN 08/01/2024 31 (H)  7 - 25 mg/dL Final    Creatinine 08/01/2024 0.96  0.60 - 1.00 mg/dL Final    eGFR 08/01/2024 63  > OR = 60 mL/min/1.73m2 Final    BUN/Creatinine Ratio 08/01/2024 32 (H)  6 - 22 (calc) Final    Sodium 08/01/2024 137  135 - 146 mmol/L Final    Potassium 08/01/2024 4.3  3.5 - 5.3 mmol/L Final    Chloride 08/01/2024 100  98 - 110 mmol/L Final    CO2 08/01/2024 27  20 - 32 mmol/L Final    Calcium 08/01/2024 9.9  8.6 - 10.4 mg/dL Final    Total Protein 08/01/2024 6.4  6.1 - 8.1 g/dL Final    Albumin 08/01/2024 4.4  3.6 - 5.1 g/dL Final    Globulin, Total 08/01/2024 2.0  1.9 - 3.7 g/dL (calc) Final    Albumin/Globulin Ratio 08/01/2024 2.2  1.0 - 2.5 (calc) Final    Total Bilirubin 08/01/2024 0.8  0.2 - 1.2 mg/dL Final    Alkaline Phosphatase 08/01/2024 76  37 - 153 U/L Final    AST 08/01/2024 18  10 - 35 U/L Final    ALT 08/01/2024 17  6 - 29 U/L Final    Cholesterol 08/01/2024 204 (H)  <200 mg/dL Final    HDL 08/01/2024 49 (L)  > OR = 50 mg/dL Final    Triglycerides 08/01/2024 282 (H)  <150 mg/dL Final    LDL Cholesterol 08/01/2024 115 (H)  mg/dL (calc) Final    HDL/Cholesterol Ratio 08/01/2024 4.2  <5.0 (calc) Final    Non HDL Chol. (LDL+VLDL) 08/01/2024 155 (H)  <130 mg/dL (calc) Final   Office Visit on 04/25/2024   Component Date Value Ref Range Status    Hemoglobin A1C, POC 04/25/2024 5.9  % Final       Past Medical History:   Diagnosis Date    Anxiety     Depression     Diabetes mellitus, type 2     GERD (gastroesophageal reflux disease)     Hyperlipidemia     Hypertension     Type 2 diabetes mellitus with mild nonproliferative retinopathy of both eyes without macular edema 08/27/2024     Social History     Socioeconomic History    Marital status:    Tobacco Use    Smoking status: Former    Smokeless tobacco: Never   Substance and Sexual Activity    Alcohol use: No    Drug use: No    Sexual activity: Yes     Social Drivers of Health     Financial Resource  Strain: Low Risk  (9/8/2020)    Overall Financial Resource Strain (CARDIA)     Difficulty of Paying Living Expenses: Not very hard   Transportation Needs: No Transportation Needs (9/8/2020)    PRAPARE - Transportation     Lack of Transportation (Medical): No     Lack of Transportation (Non-Medical): No   Stress: Stress Concern Present (9/8/2020)    [employer] - Occupational Stress Questionnaire     Feeling of Stress : To some extent     Past Surgical History:   Procedure Laterality Date    BREAST BIOPSY Left 1990'S    BENIGN    HYSTERECTOMY      INCONTINENCE SURGERY       Family History   Problem Relation Name Age of Onset    Diabetes Maternal Grandmother      Hypertension Maternal Grandmother      Diabetes Father      Breast cancer Mother  57    Hypertension Mother      Diabetes Sister      Ovarian cancer Neg Hx         All of your core healthy metrics are met.      Review of patient's allergies indicates:   Allergen Reactions    Flexeril [cyclobenzaprine] Other (See Comments)     Unknown was a long time ago.    Keflex [cephalexin] Diarrhea    Sulfa (sulfonamide antibiotics) Nausea Only    Augmentin [amoxicillin-pot clavulanate] Other (See Comments)     Gas       Current Outpatient Medications:     ALPRAZolam (XANAX) 1 MG tablet, Take 1 tablet (1 mg total) by mouth every evening., Disp: 30 tablet, Rfl: 2    atorvastatin (LIPITOR) 80 MG tablet, Take 1 tablet (80 mg total) by mouth once daily., Disp: 90 tablet, Rfl: 3    blood sugar diagnostic Strp, To check BG 1 times daily, to use with insurance preferred meter, Disp: 200 strip, Rfl: 1    blood-glucose meter kit, To check BG 1 times daily, to use with insurance preferred meter, Disp: 1 each, Rfl: 0    calcium carbonate/vitamin D3 (CALTRATE 600 PLUS D ORAL), Caltrate 600 plus D, Disp: , Rfl:     dicyclomine (BENTYL) 10 MG capsule, Take 1 capsule (10 mg total) by mouth 3 (three) times daily as needed., Disp: 30 capsule, Rfl: 0     famotidine (PEPCID) 40 MG tablet, Take 1 tablet (40 mg total) by mouth once daily., Disp: 90 tablet, Rfl: 1    glipiZIDE (GLUCOTROL) 5 MG tablet, Take 1 tablet (5 mg total) by mouth daily with breakfast., Disp: 90 tablet, Rfl: 1    hydrALAZINE (APRESOLINE) 25 MG tablet, Take 1 tablet (25 mg total) by mouth 2 (two) times a day., Disp: 180 tablet, Rfl: 3    hydroCHLOROthiazide (HYDRODIURIL) 12.5 MG Tab, Take 2 tablets (25 mg total) by mouth once daily., Disp: 180 tablet, Rfl: 3    HYDROcodone-acetaminophen (NORCO)  mg per tablet, Take 1 tablet by mouth every 6 (six) hours as needed for Pain., Disp: , Rfl:     Lactobacillus rhamnosus GG (CULTURELLE) 10 billion cell capsule, Take 1 capsule by mouth once daily. (Patient not taking: Reported on 4/25/2024), Disp: , Rfl:     lancets 33 gauge Misc, 1 lancet by Misc.(Non-Drug; Combo Route) route 3 (three) times a week., Disp: 100 each, Rfl: 3    lancets Misc, To check BG 1 times daily, to use with insurance preferred meter, Disp: 200 each, Rfl: 1    losartan (COZAAR) 100 MG tablet, Take 1 tablet (100 mg total) by mouth once daily., Disp: 90 tablet, Rfl: 0    melatonin 5 mg Cap, Take 10 mg by mouth., Disp: , Rfl:     meloxicam (MOBIC) 15 MG tablet, Take 1 tablet (15 mg total) by mouth once daily., Disp: 90 tablet, Rfl: 1    metFORMIN (GLUCOPHAGE) 500 MG tablet, Take 1 tablet (500 mg total) by mouth 2 (two) times daily., Disp: 180 tablet, Rfl: 3    ondansetron (ZOFRAN-ODT) 4 MG TbDL, Take 1 tablet (4 mg total) by mouth every 8 (eight) hours as needed., Disp: 30 tablet, Rfl: 0    pantoprazole (PROTONIX) 40 MG tablet, Take 1 tablet (40 mg total) by mouth once daily., Disp: 90 tablet, Rfl: 3    potassium chloride (MICRO-K) 10 MEQ CpSR, Take 1 capsule (10 mEq total) by mouth once daily., Disp: 90 capsule, Rfl: 3    rosuvastatin (CRESTOR) 40 MG Tab, Take 1 tablet (40 mg total) by mouth every evening., Disp: 90 tablet, Rfl: 3    tretinoin (RETIN-A) 0.05 % cream, Apply topically  "every evening. (Patient not taking: Reported on 8/8/2024), Disp: 20 g, Rfl: 3    triamcinolone acetonide 0.1% (KENALOG) 0.1 % cream, Apply topically 2 (two) times daily., Disp: 80 g, Rfl: 1    Review of Systems   Constitutional:  Negative for chills and fever.   HENT:  Negative for congestion and sore throat.    Respiratory:  Negative for cough and shortness of breath.    Cardiovascular:  Negative for chest pain and leg swelling.   Gastrointestinal:  Positive for abdominal pain and diarrhea. Negative for nausea and vomiting.   Genitourinary:  Positive for urgency. Negative for difficulty urinating, flank pain and hematuria.   Musculoskeletal:  Negative for arthralgias and back pain.   Skin:  Negative for rash.   Neurological:  Negative for weakness and headaches.          Objective:      Vitals:    09/10/24 1348   BP: 130/60   Pulse: (!) 118   SpO2: 98%   Weight: 63.3 kg (139 lb 9.6 oz)   Height: 5' 2.5" (1.588 m)     Physical Exam  Vitals and nursing note reviewed.   Constitutional:       General: She is not in acute distress.     Appearance: Normal appearance. She is well-developed.   Cardiovascular:      Rate and Rhythm: Normal rate and regular rhythm.      Heart sounds: Normal heart sounds.   Pulmonary:      Effort: Pulmonary effort is normal.      Breath sounds: Normal breath sounds.   Abdominal:      General: Bowel sounds are normal. There is no distension.      Palpations: Abdomen is soft.      Tenderness: There is abdominal tenderness in the periumbilical area. There is no right CVA tenderness or left CVA tenderness.   Neurological:      Mental Status: She is alert.           Assessment:       1. Encounter for screening for malignant neoplasm of breast, unspecified screening modality    2. Right lower quadrant pain    3. Vomiting, unspecified vomiting type, unspecified whether nausea present    4. Primary hypertension    5. Uncontrolled type 2 diabetes mellitus with hyperglycemia         Plan:     "   Encounter for screening for malignant neoplasm of breast, unspecified screening modality    Right lower quadrant pain  Comments:  ct  Orders:  -     CT Abdomen Pelvis W Wo Contrast; Future; Expected date: 09/10/2024  -     Creatinine, serum; Future; Expected date: 09/10/2024    Vomiting, unspecified vomiting type, unspecified whether nausea present  -     CT Abdomen Pelvis W Wo Contrast; Future; Expected date: 09/10/2024  -     Creatinine, serum; Future; Expected date: 09/10/2024    Primary hypertension  Comments:  bp controlled    Uncontrolled type 2 diabetes mellitus with hyperglycemia  Comments:  hga1c 6.2    Other orders  -     Discontinue: ciprofloxacin HCl (CIPRO) 500 MG tablet; Take 1 tablet (500 mg total) by mouth 2 (two) times daily. for 10 days  Dispense: 20 tablet; Refill: 0  -     Discontinue: metroNIDAZOLE (FLAGYL) 500 MG tablet; Take 1 tablet (500 mg total) by mouth 3 (three) times daily. for 7 days  Dispense: 21 tablet; Refill: 0  -     Discontinue: dicyclomine (BENTYL) 10 MG capsule; Take 1 capsule (10 mg total) by mouth 3 (three) times daily as needed.  Dispense: 30 capsule; Refill: 0  -     ciprofloxacin HCl (CIPRO) 500 MG tablet; Take 1 tablet (500 mg total) by mouth 2 (two) times daily. for 10 days  Dispense: 20 tablet; Refill: 0  -     dicyclomine (BENTYL) 10 MG capsule; Take 1 capsule (10 mg total) by mouth 3 (three) times daily as needed.  Dispense: 30 capsule; Refill: 0  -     metroNIDAZOLE (FLAGYL) 500 MG tablet; Take 1 tablet (500 mg total) by mouth 3 (three) times daily. for 7 days  Dispense: 21 tablet; Refill: 0      Follow up if symptoms worsen or fail to improve, for medication management.        9/29/2024 Karolina Ramirez

## 2024-09-30 DIAGNOSIS — F41.9 ANXIETY: ICD-10-CM

## 2024-09-30 NOTE — TELEPHONE ENCOUNTER
----- Message from Azul sent at 9/30/2024  2:47 PM CDT -----  Pt needs refill on xanax   [facility]   [phone]

## 2024-10-01 RX ORDER — ALPRAZOLAM 1 MG/1
1 TABLET ORAL NIGHTLY
Qty: 30 TABLET | Refills: 2 | Status: SHIPPED | OUTPATIENT
Start: 2024-10-01 | End: 2024-10-02 | Stop reason: SDUPTHER

## 2024-10-02 ENCOUNTER — TELEPHONE (OUTPATIENT)
Dept: FAMILY MEDICINE | Facility: CLINIC | Age: 74
End: 2024-10-02
Payer: MEDICARE

## 2024-10-02 DIAGNOSIS — F41.9 ANXIETY: ICD-10-CM

## 2024-10-02 RX ORDER — ALPRAZOLAM 1 MG/1
1 TABLET ORAL NIGHTLY
Qty: 30 TABLET | Refills: 2 | Status: SHIPPED | OUTPATIENT
Start: 2024-10-02

## 2024-10-02 NOTE — TELEPHONE ENCOUNTER
----- Message from Kimberly sent at 10/2/2024  8:00 AM CDT -----  Pt has been calling for 2 days for her xanax. Please clarify with pt on the status.   [phone]

## 2024-10-02 NOTE — TELEPHONE ENCOUNTER
Spoke with pharmacy, states it was an issue because it was sent to [facility] and had already been processed through her insurance.   Spoke with [facility], states it has not been processed yet, she is cancelling it now. Spoke with the local pharmacy, they ran it through and are filling it now.     Spoke with patient and let her know.

## 2024-10-02 NOTE — TELEPHONE ENCOUNTER
----- Message from Kimberly sent at 10/2/2024 12:50 PM CDT -----  Pt is calling back again.   [phone]

## 2024-11-14 ENCOUNTER — OFFICE VISIT (OUTPATIENT)
Dept: FAMILY MEDICINE | Facility: CLINIC | Age: 74
End: 2024-11-14
Payer: MEDICARE

## 2024-11-14 VITALS
SYSTOLIC BLOOD PRESSURE: 132 MMHG | OXYGEN SATURATION: 95 % | WEIGHT: 142.19 LBS | HEART RATE: 75 BPM | DIASTOLIC BLOOD PRESSURE: 60 MMHG | HEIGHT: 63 IN | BODY MASS INDEX: 25.2 KG/M2

## 2024-11-14 DIAGNOSIS — I10 HYPERTENSION, UNSPECIFIED TYPE: ICD-10-CM

## 2024-11-14 DIAGNOSIS — R93.89 ABNORMAL THYROID ULTRASOUND: ICD-10-CM

## 2024-11-14 DIAGNOSIS — M15.9 GENERALIZED OA: ICD-10-CM

## 2024-11-14 DIAGNOSIS — K21.9 GASTROESOPHAGEAL REFLUX DISEASE, UNSPECIFIED WHETHER ESOPHAGITIS PRESENT: ICD-10-CM

## 2024-11-14 DIAGNOSIS — F41.9 ANXIETY: ICD-10-CM

## 2024-11-14 DIAGNOSIS — Z12.39 ENCOUNTER FOR BREAST CANCER SCREENING OTHER THAN MAMMOGRAM: ICD-10-CM

## 2024-11-14 DIAGNOSIS — Z91.81 HISTORY OF RECENT FALL: ICD-10-CM

## 2024-11-14 DIAGNOSIS — E11.65 UNCONTROLLED TYPE 2 DIABETES MELLITUS WITH HYPERGLYCEMIA: Primary | ICD-10-CM

## 2024-11-14 DIAGNOSIS — E78.2 MIXED HYPERLIPIDEMIA: ICD-10-CM

## 2024-11-14 DIAGNOSIS — Z12.31 ENCOUNTER FOR SCREENING MAMMOGRAM FOR MALIGNANT NEOPLASM OF BREAST: ICD-10-CM

## 2024-11-14 DIAGNOSIS — Z23 NEED FOR INFLUENZA VACCINATION: ICD-10-CM

## 2024-11-14 LAB — HBA1C MFR BLD: 6.7 %

## 2024-11-14 PROCEDURE — 3066F NEPHROPATHY DOC TX: CPT | Mod: CPTII,S$GLB,, | Performed by: NURSE PRACTITIONER

## 2024-11-14 PROCEDURE — 3061F NEG MICROALBUMINURIA REV: CPT | Mod: CPTII,S$GLB,, | Performed by: NURSE PRACTITIONER

## 2024-11-14 PROCEDURE — 3008F BODY MASS INDEX DOCD: CPT | Mod: CPTII,S$GLB,, | Performed by: NURSE PRACTITIONER

## 2024-11-14 PROCEDURE — 99214 OFFICE O/P EST MOD 30 MIN: CPT | Mod: S$GLB,,, | Performed by: NURSE PRACTITIONER

## 2024-11-14 PROCEDURE — 83036 HEMOGLOBIN GLYCOSYLATED A1C: CPT | Mod: QW,,, | Performed by: NURSE PRACTITIONER

## 2024-11-14 PROCEDURE — G0008 ADMIN INFLUENZA VIRUS VAC: HCPCS | Mod: S$GLB,,, | Performed by: NURSE PRACTITIONER

## 2024-11-14 PROCEDURE — 1160F RVW MEDS BY RX/DR IN RCRD: CPT | Mod: CPTII,S$GLB,, | Performed by: NURSE PRACTITIONER

## 2024-11-14 PROCEDURE — 3075F SYST BP GE 130 - 139MM HG: CPT | Mod: CPTII,S$GLB,, | Performed by: NURSE PRACTITIONER

## 2024-11-14 PROCEDURE — 1125F AMNT PAIN NOTED PAIN PRSNT: CPT | Mod: CPTII,S$GLB,, | Performed by: NURSE PRACTITIONER

## 2024-11-14 PROCEDURE — 3044F HG A1C LEVEL LT 7.0%: CPT | Mod: CPTII,S$GLB,, | Performed by: NURSE PRACTITIONER

## 2024-11-14 PROCEDURE — 3078F DIAST BP <80 MM HG: CPT | Mod: CPTII,S$GLB,, | Performed by: NURSE PRACTITIONER

## 2024-11-14 PROCEDURE — 3288F FALL RISK ASSESSMENT DOCD: CPT | Mod: CPTII,S$GLB,, | Performed by: NURSE PRACTITIONER

## 2024-11-14 PROCEDURE — 1159F MED LIST DOCD IN RCRD: CPT | Mod: CPTII,S$GLB,, | Performed by: NURSE PRACTITIONER

## 2024-11-14 PROCEDURE — 1100F PTFALLS ASSESS-DOCD GE2>/YR: CPT | Mod: CPTII,S$GLB,, | Performed by: NURSE PRACTITIONER

## 2024-11-14 PROCEDURE — 4010F ACE/ARB THERAPY RXD/TAKEN: CPT | Mod: CPTII,S$GLB,, | Performed by: NURSE PRACTITIONER

## 2024-11-14 PROCEDURE — 90653 IIV ADJUVANT VACCINE IM: CPT | Mod: S$GLB,,, | Performed by: NURSE PRACTITIONER

## 2024-11-14 RX ORDER — MELOXICAM 15 MG/1
15 TABLET ORAL DAILY
Qty: 90 TABLET | Refills: 1 | Status: SHIPPED | OUTPATIENT
Start: 2024-11-14

## 2024-11-14 RX ORDER — LOSARTAN POTASSIUM 100 MG/1
100 TABLET ORAL DAILY
Qty: 90 TABLET | Refills: 0 | Status: SHIPPED | OUTPATIENT
Start: 2024-11-14 | End: 2025-02-12

## 2024-11-14 RX ORDER — HYDROCHLOROTHIAZIDE 12.5 MG/1
25 TABLET ORAL DAILY
Qty: 180 TABLET | Refills: 3 | Status: SHIPPED | OUTPATIENT
Start: 2024-11-14

## 2024-11-14 RX ORDER — FAMOTIDINE 40 MG/1
40 TABLET, FILM COATED ORAL DAILY
Qty: 90 TABLET | Refills: 1 | Status: SHIPPED | OUTPATIENT
Start: 2024-11-14 | End: 2025-11-14

## 2024-11-18 NOTE — PROGRESS NOTES
SUBJECTIVE:    Patient ID: Kay Valencia is a 74 y.o. female.    Chief Complaint: Diabetes (Bottle brought//Pt is here for a check up and medication refills//Pt would like her flu vaccine//mammo ordered today//TONY )    History of Present Illness    CHIEF COMPLAINT:  74 year old female  presents for check up. She also recently was seen in er following- a fall on voting day, discussing her injuries     HPI:  Kay fell face-first on November 5th when she tripped over cement barriers in a parking lot, injuring her face, finger, and right side. She denied losing consciousness and declined emergency services offered by a bystander. She later sought medical attention at [facility] where a CT scan was performed, reportedly normal except for her finger.    She has been having pain and discomfort since the fall, particularly in her fractured finger, along with shoulder pain and generalized aching. She has attempted to immobilize her injured finger using various methods, including a splint from [employer] and a popsicle stick, but reports that the splint causes finger swelling.    She has been taking ibuprofen for pain relief and occasionally uses pain medication at night when needed. The injury has caused difficulty with daily activities, particularly cooking. She has an appointment scheduled with Dr. Williamson on Monday to follow up on her finger injury.    Regarding other health concerns, the patient reports improvement in her previous stomach issues. She mentions seeing a GI specialist who performed tests, all of which were normal.    Kay denies any current issues with sleep or stress management.    MEDICATIONS:  Kay is on Xanax. She is taking Caltrate with vitamin D3 and Calcium for bone health. Ibuprofen is prescribed as needed for pain management.    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 She also has a history of diabetes. Kay received a single-shot COVID-19  vaccine at [facility]. She also got an RSV vaccine at the same location. She received a PCV13 pneumonia vaccine in 2018 and a PPSV23 pneumonia vaccine in 2020.    TEST RESULTS:  Kay's recent A1C test result was 6.7, which is slightly increased but within an acceptable range.    IMAGING:  On 11/5/2023, the patient underwent a CT Head scan, which showed normal results. On the same day, an X-ray of her hand revealed a non-displaced fracture, while an X-ray of her face showed normal results. She had a DEXA Bone Scan in 2022, which indicated a 7.6% increase in bone density compared to her previous scan. Her first DEXA Bone Scan was completed in 2017.      ROS:  General: -fever, -chills, -fatigue, -weight gain, -weight loss  Eyes: -vision changes, -redness, -discharge  ENT: -ear pain, -nasal congestion, -sore throat  Cardiovascular: -chest pain, -palpitations, -lower extremity edema  Respiratory: -cough, -shortness of breath  Gastrointestinal: -abdominal pain, -nausea, -vomiting, -diarrhea, -constipation, -blood in stool, -change in bowel habits  Genitourinary: -dysuria, -hematuria, -frequency  Musculoskeletal: +joint pain, -muscle pain  Skin: -rash, -lesion  Neurological: -headache, -dizziness, -numbness, -tingling  Psychiatric: -anxiety, -depression, -sleep difficulty         Office Visit on 11/14/2024   Component Date Value Ref Range Status    Hemoglobin A1C, POC 11/14/2024 6.7  % Final   Lab Visit on 09/10/2024   Component Date Value Ref Range Status    Creatinine 09/10/2024 1.4  0.5 - 1.4 mg/dL Final    eGFR 09/10/2024 39.7 (A)  >60 mL/min/1.73 m^2 Final   Office Visit on 08/31/2024   Component Date Value Ref Range Status    Urine Culture, Routine 08/31/2024 Final report   Final    Result 1 08/31/2024 No growth   Final   Patient Outreach on 08/29/2024   Component Date Value Ref Range Status    Left Eye DM Retinopathy 08/27/2024 Positive   Final    Right Eye DM Retinopathy 08/27/2024 Positive   Final   Office Visit  on 08/08/2024   Component Date Value Ref Range Status    Hemoglobin A1C, POC 08/08/2024 6.2  % Final   Telephone on 07/25/2024   Component Date Value Ref Range Status    Glucose 08/01/2024 127 (H)  65 - 99 mg/dL Final    BUN 08/01/2024 31 (H)  7 - 25 mg/dL Final    Creatinine 08/01/2024 0.96  0.60 - 1.00 mg/dL Final    eGFR 08/01/2024 63  > OR = 60 mL/min/1.73m2 Final    BUN/Creatinine Ratio 08/01/2024 32 (H)  6 - 22 (calc) Final    Sodium 08/01/2024 137  135 - 146 mmol/L Final    Potassium 08/01/2024 4.3  3.5 - 5.3 mmol/L Final    Chloride 08/01/2024 100  98 - 110 mmol/L Final    CO2 08/01/2024 27  20 - 32 mmol/L Final    Calcium 08/01/2024 9.9  8.6 - 10.4 mg/dL Final    Total Protein 08/01/2024 6.4  6.1 - 8.1 g/dL Final    Albumin 08/01/2024 4.4  3.6 - 5.1 g/dL Final    Globulin, Total 08/01/2024 2.0  1.9 - 3.7 g/dL (calc) Final    Albumin/Globulin Ratio 08/01/2024 2.2  1.0 - 2.5 (calc) Final    Total Bilirubin 08/01/2024 0.8  0.2 - 1.2 mg/dL Final    Alkaline Phosphatase 08/01/2024 76  37 - 153 U/L Final    AST 08/01/2024 18  10 - 35 U/L Final    ALT 08/01/2024 17  6 - 29 U/L Final    Cholesterol 08/01/2024 204 (H)  <200 mg/dL Final    HDL 08/01/2024 49 (L)  > OR = 50 mg/dL Final    Triglycerides 08/01/2024 282 (H)  <150 mg/dL Final    LDL Cholesterol 08/01/2024 115 (H)  mg/dL (calc) Final    HDL/Cholesterol Ratio 08/01/2024 4.2  <5.0 (calc) Final    Non HDL Chol. (LDL+VLDL) 08/01/2024 155 (H)  <130 mg/dL (calc) Final       Past Medical History:   Diagnosis Date    Anxiety     Depression     Diabetes mellitus, type 2     GERD (gastroesophageal reflux disease)     Hyperlipidemia     Hypertension     Type 2 diabetes mellitus with mild nonproliferative retinopathy of both eyes without macular edema 08/27/2024     Social History     Socioeconomic History    Marital status:    Tobacco Use    Smoking status: Former    Smokeless tobacco: Never   Substance and Sexual Activity    Alcohol use: No    Drug use: No     Sexual activity: Yes     Social Drivers of Health     Financial Resource Strain: Low Risk  (9/8/2020)    Overall Financial Resource Strain (CARDIA)     Difficulty of Paying Living Expenses: Not very hard   Transportation Needs: No Transportation Needs (9/8/2020)    PRAPARE - Transportation     Lack of Transportation (Medical): No     Lack of Transportation (Non-Medical): No   Stress: Stress Concern Present (9/8/2020)    [employer] - Occupational Stress Questionnaire     Feeling of Stress : To some extent     Past Surgical History:   Procedure Laterality Date    BREAST BIOPSY Left 1990'S    BENIGN    HYSTERECTOMY      INCONTINENCE SURGERY       Family History   Problem Relation Name Age of Onset    Diabetes Maternal Grandmother      Hypertension Maternal Grandmother      Diabetes Father      Breast cancer Mother  57    Hypertension Mother      Diabetes Sister      Ovarian cancer Neg Hx         All of your core healthy metrics are met.      Review of patient's allergies indicates:   Allergen Reactions    Flexeril [cyclobenzaprine] Other (See Comments)     Unknown was a long time ago.    Keflex [cephalexin] Diarrhea    Sulfa (sulfonamide antibiotics) Nausea Only    Augmentin [amoxicillin-pot clavulanate] Other (See Comments)     Gas       Current Outpatient Medications:     ALPRAZolam (XANAX) 1 MG tablet, Take 1 tablet (1 mg total) by mouth every evening., Disp: 30 tablet, Rfl: 2    atorvastatin (LIPITOR) 80 MG tablet, Take 1 tablet (80 mg total) by mouth once daily., Disp: 90 tablet, Rfl: 3    blood sugar diagnostic Strp, To check BG 1 times daily, to use with insurance preferred meter, Disp: 200 strip, Rfl: 1    calcium carbonate/vitamin D3 (CALTRATE 600 PLUS D ORAL), Caltrate 600 plus D, Disp: , Rfl:     glipiZIDE (GLUCOTROL) 5 MG tablet, Take 1 tablet (5 mg total) by mouth daily with breakfast., Disp: 90 tablet, Rfl: 1    hydrALAZINE (APRESOLINE) 25 MG tablet, Take 1 tablet (25 mg  total) by mouth 2 (two) times a day., Disp: 180 tablet, Rfl: 3    HYDROcodone-acetaminophen (NORCO)  mg per tablet, Take 1 tablet by mouth every 6 (six) hours as needed for Pain., Disp: , Rfl:     Lactobacillus rhamnosus GG (CULTURELLE) 10 billion cell capsule, Take 1 capsule by mouth once daily., Disp: , Rfl:     lancets 33 gauge Misc, 1 lancet by Misc.(Non-Drug; Combo Route) route 3 (three) times a week., Disp: 100 each, Rfl: 3    lancets Misc, To check BG 1 times daily, to use with insurance preferred meter, Disp: 200 each, Rfl: 1    melatonin 5 mg Cap, Take 10 mg by mouth., Disp: , Rfl:     metFORMIN (GLUCOPHAGE) 500 MG tablet, Take 1 tablet (500 mg total) by mouth 2 (two) times daily., Disp: 180 tablet, Rfl: 3    ondansetron (ZOFRAN-ODT) 4 MG TbDL, Take 1 tablet (4 mg total) by mouth every 8 (eight) hours as needed., Disp: 30 tablet, Rfl: 0    pantoprazole (PROTONIX) 40 MG tablet, Take 1 tablet (40 mg total) by mouth once daily., Disp: 90 tablet, Rfl: 3    potassium chloride (MICRO-K) 10 MEQ CpSR, Take 1 capsule (10 mEq total) by mouth once daily., Disp: 90 capsule, Rfl: 3    triamcinolone acetonide 0.1% (KENALOG) 0.1 % cream, Apply topically 2 (two) times daily., Disp: 80 g, Rfl: 1    blood-glucose meter kit, To check BG 1 times daily, to use with insurance preferred meter, Disp: 1 each, Rfl: 0    famotidine (PEPCID) 40 MG tablet, Take 1 tablet (40 mg total) by mouth once daily., Disp: 90 tablet, Rfl: 1    hydroCHLOROthiazide (HYDRODIURIL) 12.5 MG Tab, Take 2 tablets (25 mg total) by mouth once daily., Disp: 180 tablet, Rfl: 3    losartan (COZAAR) 100 MG tablet, Take 1 tablet (100 mg total) by mouth once daily., Disp: 90 tablet, Rfl: 0    meloxicam (MOBIC) 15 MG tablet, Take 1 tablet (15 mg total) by mouth once daily., Disp: 90 tablet, Rfl: 1    rosuvastatin (CRESTOR) 40 MG Tab, Take 1 tablet (40 mg total) by mouth every evening. (Patient not taking: Reported on 11/14/2024), Disp: 90 tablet, Rfl: 3     "tretinoin (RETIN-A) 0.05 % cream, Apply topically every evening. (Patient not taking: Reported on 11/14/2024), Disp: 20 g, Rfl: 3    Objective:      Vitals:    11/14/24 1330   BP: 132/60   Pulse: 75   SpO2: 95%   Weight: 64.5 kg (142 lb 3.2 oz)   Height: 5' 2.5" (1.588 m)     Physical Exam    General: No acute distress. Well-developed. Well-nourished.  Eyes: EOMI. Sclerae anicteric. Healing contusion around right  HENT: Normocephalic. Atraumatic. Nares patent. Moist oral mucosa.  Ears: Bilateral TMs clear. Bilateral EACs clear.  Cardiovascular: Regular rate. Regular rhythm. No murmurs.   Respiratory: Normal respiratory effort. Clear to auscultation bilaterally. No rales. No rhonchi. No wheezing.  Abdomen: Soft. Non-tender. Non-distended. Normoactive bowel sounds.  Musculoskeletal: No  obvious deformity.  Extremities: No lower extremity edema. Contusion to right hip  Neurological: Alert & oriented x3. No slurred speech. Normal gait.  Psychiatric: Normal mood. Normal affect. Good insight. Good judgment.  Skin: Warm. Dry. No rash.  Neck: Enlarged thyroid on left side.       Assessment:       Assessment & Plan    - Assessed recent fall injury, noting fracture appears non-displaced and should heal with proper immobilization  - Reviewed A1C results, showing slight increase to 6.7 but still within acceptable range  - Evaluated bone density scan results, noting significant improvement (17% increase since 2017, 7.6% increase since 2022)  - Detected slight enlargement of thyroid on left side during physical exam, warranting further investigation via ultrasound    TYPE 2 DIABETES MELLITUS:  Explained importance of maintaining good A1C control for eye health.    FINGER FRACTURE:  Discussed healing process for finger fracture, emphasizing it could take up to 6 weeks for full recovery.   continue immobilizing fractured finger with splint or li taping.      BONE HEALTH:  Educated on the significance of weight-bearing exercises " for bone health.  maintain current calcium and vitamin D supplementation regimen.   resume weight-bearing exercises once recovered from fall injuries.  Continued Caltrate with vitamin D3.    ANXIETY DISORDER:  Continued Xanax (appears to have 1 refill remaining).    THYROID NODULE:  Thyroid ultrasound ordered.  Schedule thyroid ultrasound at [facility], inform office once completed.    BREAST CANCER SCREENING:  Mammogram ordered (after November 30th).  Schedule mammogram after December 1st.    MEDICATION MANAGEMENT:  Refilled all other current medications through [facility] pharmacy, except Xanax.  Contact office if any issues arise with prescription refills.    FOLLOW-UP EXAMINATION:  Follow up with orthopedics appointment as scheduled (Dr. Williamson on Monday).       Plan:       Uncontrolled type 2 diabetes mellitus with hyperglycemia  Comments:  hga1c 6.7mg/dl  Orders:  -     Hemoglobin A1C, POCT    Need for influenza vaccination  -     influenza (adjuvanted) (Fluad) 45 mcg/0.5 mL IM vaccine (> or = 64 yo) 0.5 mL    Encounter for breast cancer screening other than mammogram  -     Mammo Digital Screening Bilat; Future; Expected date: 11/14/2024    Hypertension, unspecified type  Comments:  well controlled. continue as is  Orders:  -     hydroCHLOROthiazide (HYDRODIURIL) 12.5 MG Tab; Take 2 tablets (25 mg total) by mouth once daily.  Dispense: 180 tablet; Refill: 3    Mixed hyperlipidemia  Comments:  crestor  Orders:  -     losartan (COZAAR) 100 MG tablet; Take 1 tablet (100 mg total) by mouth once daily.  Dispense: 90 tablet; Refill: 0    Abnormal thyroid ultrasound  Comments:  ultrasound ordered  Orders:  -     US Thyroid; Future; Expected date: 11/14/2024    Gastroesophageal reflux disease, unspecified whether esophagitis present  Comments:  prontonix  Orders:  -     famotidine (PEPCID) 40 MG tablet; Take 1 tablet (40 mg total) by mouth once daily.  Dispense: 90 tablet; Refill: 1    Generalized OA  -     meloxicam (MOBIC)  15 MG tablet; Take 1 tablet (15 mg total) by mouth once daily.  Dispense: 90 tablet; Refill: 1    Encounter for screening mammogram for malignant neoplasm of breast  -     Mammo Digital Screening Bilat; Future; Expected date: 11/14/2024    Anxiety  Comments:  xanax prn    History of recent fall  Comments:  doing ok. contusion on face healing      Follow up in about 3 months (around 2/14/2025), or if symptoms worsen or fail to improve, for medication management, Diabetic Check-Up.        This note was generated with the assistance of ambient listening technology. Verbal consent was obtained by the patient and accompanying visitor(s) for the recording of patient appointment to facilitate this note. I attest to having reviewed and edited the generated note for accuracy, though some syntax or spelling errors may persist. Please contact the author of this note for any clarification.      11/18/2024 Karolina Ramirez

## 2024-11-26 ENCOUNTER — OFFICE VISIT (OUTPATIENT)
Dept: FAMILY MEDICINE | Facility: CLINIC | Age: 74
End: 2024-11-26
Payer: MEDICARE

## 2024-11-26 ENCOUNTER — TELEPHONE (OUTPATIENT)
Dept: FAMILY MEDICINE | Facility: CLINIC | Age: 74
End: 2024-11-26
Payer: MEDICARE

## 2024-11-26 VITALS
OXYGEN SATURATION: 96 % | DIASTOLIC BLOOD PRESSURE: 74 MMHG | BODY MASS INDEX: 25.16 KG/M2 | WEIGHT: 142 LBS | SYSTOLIC BLOOD PRESSURE: 138 MMHG | HEIGHT: 63 IN | HEART RATE: 90 BPM

## 2024-11-26 DIAGNOSIS — F41.9 ANXIETY: ICD-10-CM

## 2024-11-26 DIAGNOSIS — E11.3293 TYPE 2 DIABETES MELLITUS WITH BOTH EYES AFFECTED BY MILD NONPROLIFERATIVE RETINOPATHY WITHOUT MACULAR EDEMA, WITHOUT LONG-TERM CURRENT USE OF INSULIN: Primary | ICD-10-CM

## 2024-11-26 DIAGNOSIS — L02.91 ABSCESS: ICD-10-CM

## 2024-11-26 DIAGNOSIS — M15.0 PRIMARY OSTEOARTHRITIS INVOLVING MULTIPLE JOINTS: ICD-10-CM

## 2024-11-26 DIAGNOSIS — I10 HYPERTENSION, UNSPECIFIED TYPE: ICD-10-CM

## 2024-11-26 DIAGNOSIS — B37.9 CANDIDIASIS: ICD-10-CM

## 2024-11-26 DIAGNOSIS — E78.2 MIXED HYPERLIPIDEMIA: ICD-10-CM

## 2024-11-26 PROCEDURE — 3008F BODY MASS INDEX DOCD: CPT | Mod: CPTII,S$GLB,, | Performed by: NURSE PRACTITIONER

## 2024-11-26 PROCEDURE — 99213 OFFICE O/P EST LOW 20 MIN: CPT | Mod: 25,S$GLB,, | Performed by: NURSE PRACTITIONER

## 2024-11-26 PROCEDURE — 1160F RVW MEDS BY RX/DR IN RCRD: CPT | Mod: CPTII,S$GLB,, | Performed by: NURSE PRACTITIONER

## 2024-11-26 PROCEDURE — 1101F PT FALLS ASSESS-DOCD LE1/YR: CPT | Mod: CPTII,S$GLB,, | Performed by: NURSE PRACTITIONER

## 2024-11-26 PROCEDURE — 10160 PNXR ASPIR ABSC HMTMA BULLA: CPT | Mod: S$GLB,,, | Performed by: NURSE PRACTITIONER

## 2024-11-26 PROCEDURE — 3061F NEG MICROALBUMINURIA REV: CPT | Mod: CPTII,S$GLB,, | Performed by: NURSE PRACTITIONER

## 2024-11-26 PROCEDURE — 1125F AMNT PAIN NOTED PAIN PRSNT: CPT | Mod: CPTII,S$GLB,, | Performed by: NURSE PRACTITIONER

## 2024-11-26 PROCEDURE — 4010F ACE/ARB THERAPY RXD/TAKEN: CPT | Mod: CPTII,S$GLB,, | Performed by: NURSE PRACTITIONER

## 2024-11-26 PROCEDURE — 3288F FALL RISK ASSESSMENT DOCD: CPT | Mod: CPTII,S$GLB,, | Performed by: NURSE PRACTITIONER

## 2024-11-26 PROCEDURE — 1159F MED LIST DOCD IN RCRD: CPT | Mod: CPTII,S$GLB,, | Performed by: NURSE PRACTITIONER

## 2024-11-26 PROCEDURE — 3075F SYST BP GE 130 - 139MM HG: CPT | Mod: CPTII,S$GLB,, | Performed by: NURSE PRACTITIONER

## 2024-11-26 PROCEDURE — 3066F NEPHROPATHY DOC TX: CPT | Mod: CPTII,S$GLB,, | Performed by: NURSE PRACTITIONER

## 2024-11-26 PROCEDURE — 3078F DIAST BP <80 MM HG: CPT | Mod: CPTII,S$GLB,, | Performed by: NURSE PRACTITIONER

## 2024-11-26 PROCEDURE — 3044F HG A1C LEVEL LT 7.0%: CPT | Mod: CPTII,S$GLB,, | Performed by: NURSE PRACTITIONER

## 2024-11-26 RX ORDER — MUPIROCIN 20 MG/G
OINTMENT TOPICAL 3 TIMES DAILY
Qty: 22 G | Refills: 0 | Status: SHIPPED | OUTPATIENT
Start: 2024-11-26

## 2024-11-26 RX ORDER — CEPHALEXIN 500 MG/1
500 CAPSULE ORAL EVERY 8 HOURS
Qty: 30 CAPSULE | Refills: 0 | Status: SHIPPED | OUTPATIENT
Start: 2024-11-26

## 2024-11-26 RX ORDER — FLUCONAZOLE 100 MG/1
100 TABLET ORAL DAILY
Qty: 3 TABLET | Refills: 0 | Status: SHIPPED | OUTPATIENT
Start: 2024-11-26 | End: 2024-11-29

## 2024-11-26 NOTE — TELEPHONE ENCOUNTER
----- Message from Barbara sent at 11/26/2024  8:04 AM CST -----  Pt needs to be seen asap. Boil. Pt #[phone]

## 2024-12-03 PROBLEM — E11.3293 TYPE 2 DIABETES MELLITUS WITH BOTH EYES AFFECTED BY MILD NONPROLIFERATIVE RETINOPATHY WITHOUT MACULAR EDEMA, WITHOUT LONG-TERM CURRENT USE OF INSULIN: Status: ACTIVE | Noted: 2024-12-03

## 2024-12-04 ENCOUNTER — HOSPITAL ENCOUNTER (OUTPATIENT)
Dept: RADIOLOGY | Facility: HOSPITAL | Age: 74
Discharge: HOME | End: 2024-12-04
Attending: NURSE PRACTITIONER
Payer: MEDICARE

## 2024-12-04 VITALS — WEIGHT: 142 LBS | HEIGHT: 63 IN | BODY MASS INDEX: 25.16 KG/M2

## 2024-12-04 DIAGNOSIS — Z12.31 ENCOUNTER FOR SCREENING MAMMOGRAM FOR MALIGNANT NEOPLASM OF BREAST: ICD-10-CM

## 2024-12-04 DIAGNOSIS — Z12.39 ENCOUNTER FOR BREAST CANCER SCREENING OTHER THAN MAMMOGRAM: ICD-10-CM

## 2024-12-04 PROCEDURE — 77067 SCR MAMMO BI INCL CAD: CPT | Mod: 26,,, | Performed by: RADIOLOGY

## 2024-12-04 PROCEDURE — 77063 BREAST TOMOSYNTHESIS BI: CPT | Mod: 26,,, | Performed by: RADIOLOGY

## 2024-12-04 PROCEDURE — 77067 SCR MAMMO BI INCL CAD: CPT | Mod: TC,PO

## 2024-12-04 NOTE — PROGRESS NOTES
SUBJECTIVE:    Patient ID: Kay Valencia is a 74 y.o. female.    Chief Complaint: Follow-up (Left gluteus mass pain//discomfort, no bottles, upcoming mammogram, abc )    History of Present Illness    CHIEF COMPLAINT:  74 presents today for urgent visit.    FINGER INJURY:  She reports ongoing discomfort in her injured finger with intermittent pain and functional limitations, stating she is unable to perform certain activities due to the injury.    SKIN LESIONS:  She presents with a skin lesion on her buttocks, describing it as a boil of recent onset. The lesion is painful and appears to have been scratched. She notes it feels softer than before but still hurts slightly. She reports skin irritation near her buttocks accompanied by intermittent itching. She has been using triamcinolone, a prescription topical medication, for treatment. She denies the presence of loose scabs in the affected area.    GASTROINTESTINAL HEALTH:  She reports her abdomen has been good overall. However, she notes that dark cycling upsets her abdomen.    MEDICATION ALLERGIES AND SENSITIVITIES:  She reports experiencing severe GI side effects from Augmentin, specifically excessive gas. She also mentions that doxycycline causes stomach upset. She denies any other medication allergies or sensitivities.      ROS:  General: -fever, -chills, -fatigue, -weight gain, -weight loss  Eyes: -vision changes, -redness, -discharge  ENT: -ear pain, -nasal congestion, -sore throat  Cardiovascular: -chest pain, -palpitations, -lower extremity edema  Respiratory: -cough, -shortness of breath  Gastrointestinal: -abdominal pain, -nausea, -vomiting, -diarrhea, -constipation, -blood in stool, -change in bowel habits  Genitourinary: -dysuria, -hematuria, -frequency  Musculoskeletal: +joint pain, -muscle pain  Skin: -rash, -lesion, +itching  Neurological: -headache, -dizziness, -numbness, -tingling  Psychiatric: -anxiety, -depression, -sleep difficulty  Allergic:  -allergic reactions         Office Visit on 11/14/2024   Component Date Value Ref Range Status    Hemoglobin A1C, POC 11/14/2024 6.7  % Final   Lab Visit on 09/10/2024   Component Date Value Ref Range Status    Creatinine 09/10/2024 1.4  0.5 - 1.4 mg/dL Final    eGFR 09/10/2024 39.7 (A)  >60 mL/min/1.73 m^2 Final   Office Visit on 08/31/2024   Component Date Value Ref Range Status    Urine Culture, Routine 08/31/2024 Final report   Final    Result 1 08/31/2024 No growth   Final   Patient Outreach on 08/29/2024   Component Date Value Ref Range Status    Left Eye DM Retinopathy 08/27/2024 Positive   Final    Right Eye DM Retinopathy 08/27/2024 Positive   Final   Office Visit on 08/08/2024   Component Date Value Ref Range Status    Hemoglobin A1C, POC 08/08/2024 6.2  % Final   Telephone on 07/25/2024   Component Date Value Ref Range Status    Glucose 08/01/2024 127 (H)  65 - 99 mg/dL Final    BUN 08/01/2024 31 (H)  7 - 25 mg/dL Final    Creatinine 08/01/2024 0.96  0.60 - 1.00 mg/dL Final    eGFR 08/01/2024 63  > OR = 60 mL/min/1.73m2 Final    BUN/Creatinine Ratio 08/01/2024 32 (H)  6 - 22 (calc) Final    Sodium 08/01/2024 137  135 - 146 mmol/L Final    Potassium 08/01/2024 4.3  3.5 - 5.3 mmol/L Final    Chloride 08/01/2024 100  98 - 110 mmol/L Final    CO2 08/01/2024 27  20 - 32 mmol/L Final    Calcium 08/01/2024 9.9  8.6 - 10.4 mg/dL Final    Total Protein 08/01/2024 6.4  6.1 - 8.1 g/dL Final    Albumin 08/01/2024 4.4  3.6 - 5.1 g/dL Final    Globulin, Total 08/01/2024 2.0  1.9 - 3.7 g/dL (calc) Final    Albumin/Globulin Ratio 08/01/2024 2.2  1.0 - 2.5 (calc) Final    Total Bilirubin 08/01/2024 0.8  0.2 - 1.2 mg/dL Final    Alkaline Phosphatase 08/01/2024 76  37 - 153 U/L Final    AST 08/01/2024 18  10 - 35 U/L Final    ALT 08/01/2024 17  6 - 29 U/L Final    Cholesterol 08/01/2024 204 (H)  <200 mg/dL Final    HDL 08/01/2024 49 (L)  > OR = 50 mg/dL Final    Triglycerides 08/01/2024 282 (H)  <150 mg/dL Final    LDL  Cholesterol 08/01/2024 115 (H)  mg/dL (calc) Final    HDL/Cholesterol Ratio 08/01/2024 4.2  <5.0 (calc) Final    Non HDL Chol. (LDL+VLDL) 08/01/2024 155 (H)  <130 mg/dL (calc) Final       Past Medical History:   Diagnosis Date    Anxiety     Depression     Diabetes mellitus, type 2     GERD (gastroesophageal reflux disease)     Hyperlipidemia     Hypertension     Type 2 diabetes mellitus with mild nonproliferative retinopathy of both eyes without macular edema 08/27/2024     Social History     Socioeconomic History    Marital status:    Tobacco Use    Smoking status: Former    Smokeless tobacco: Never   Substance and Sexual Activity    Alcohol use: No    Drug use: No    Sexual activity: Yes     Social Drivers of Health     Financial Resource Strain: Low Risk  (9/8/2020)    Overall Financial Resource Strain (CARDIA)     Difficulty of Paying Living Expenses: Not very hard   Transportation Needs: No Transportation Needs (9/8/2020)    PRAPARE - Transportation     Lack of Transportation (Medical): No     Lack of Transportation (Non-Medical): No   Stress: Stress Concern Present (9/8/2020)    [employer] [place] [employer] - Occupational Stress Questionnaire     Feeling of Stress : To some extent     Past Surgical History:   Procedure Laterality Date    BREAST BIOPSY Left 1990'S    BENIGN    HYSTERECTOMY      INCONTINENCE SURGERY       Family History   Problem Relation Name Age of Onset    Diabetes Maternal Grandmother      Hypertension Maternal Grandmother      Diabetes Father      Breast cancer Mother  57    Hypertension Mother      Diabetes Sister      Ovarian cancer Neg Hx         Tests to Keep You Healthy    Mammogram: SCHEDULED  Eye Exam: Met on 8/27/2024  Colon Cancer Screening: Met on 9/21/2017  Last Blood Pressure <= 139/89 (11/26/2024): Yes  Last HbA1c < 8 (11/14/2024): Yes      Review of patient's allergies indicates:   Allergen Reactions    Flexeril [cyclobenzaprine] Other (See Comments)      Unknown was a long time ago.    Keflex [cephalexin] Diarrhea    Sulfa (sulfonamide antibiotics) Nausea Only    Augmentin [amoxicillin-pot clavulanate] Other (See Comments)     Gas       Current Outpatient Medications:     ALPRAZolam (XANAX) 1 MG tablet, Take 1 tablet (1 mg total) by mouth every evening., Disp: 30 tablet, Rfl: 2    atorvastatin (LIPITOR) 80 MG tablet, Take 1 tablet (80 mg total) by mouth once daily., Disp: 90 tablet, Rfl: 3    blood sugar diagnostic Strp, To check BG 1 times daily, to use with insurance preferred meter, Disp: 200 strip, Rfl: 1    calcium carbonate/vitamin D3 (CALTRATE 600 PLUS D ORAL), Caltrate 600 plus D, Disp: , Rfl:     famotidine (PEPCID) 40 MG tablet, Take 1 tablet (40 mg total) by mouth once daily., Disp: 90 tablet, Rfl: 1    glipiZIDE (GLUCOTROL) 5 MG tablet, Take 1 tablet (5 mg total) by mouth daily with breakfast., Disp: 90 tablet, Rfl: 1    hydrALAZINE (APRESOLINE) 25 MG tablet, Take 1 tablet (25 mg total) by mouth 2 (two) times a day., Disp: 180 tablet, Rfl: 3    hydroCHLOROthiazide (HYDRODIURIL) 12.5 MG Tab, Take 2 tablets (25 mg total) by mouth once daily., Disp: 180 tablet, Rfl: 3    HYDROcodone-acetaminophen (NORCO)  mg per tablet, Take 1 tablet by mouth every 6 (six) hours as needed for Pain., Disp: , Rfl:     Lactobacillus rhamnosus GG (CULTURELLE) 10 billion cell capsule, Take 1 capsule by mouth once daily., Disp: , Rfl:     lancets 33 gauge Misc, 1 lancet by Misc.(Non-Drug; Combo Route) route 3 (three) times a week., Disp: 100 each, Rfl: 3    lancets Misc, To check BG 1 times daily, to use with insurance preferred meter, Disp: 200 each, Rfl: 1    losartan (COZAAR) 100 MG tablet, Take 1 tablet (100 mg total) by mouth once daily., Disp: 90 tablet, Rfl: 0    melatonin 5 mg Cap, Take 10 mg by mouth., Disp: , Rfl:     meloxicam (MOBIC) 15 MG tablet, Take 1 tablet (15 mg total) by mouth once daily., Disp: 90 tablet, Rfl: 1    metFORMIN (GLUCOPHAGE) 500 MG tablet,  "Take 1 tablet (500 mg total) by mouth 2 (two) times daily., Disp: 180 tablet, Rfl: 3    ondansetron (ZOFRAN-ODT) 4 MG TbDL, Take 1 tablet (4 mg total) by mouth every 8 (eight) hours as needed., Disp: 30 tablet, Rfl: 0    pantoprazole (PROTONIX) 40 MG tablet, Take 1 tablet (40 mg total) by mouth once daily., Disp: 90 tablet, Rfl: 3    potassium chloride (MICRO-K) 10 MEQ CpSR, Take 1 capsule (10 mEq total) by mouth once daily., Disp: 90 capsule, Rfl: 3    rosuvastatin (CRESTOR) 40 MG Tab, Take 1 tablet (40 mg total) by mouth every evening., Disp: 90 tablet, Rfl: 3    tretinoin (RETIN-A) 0.05 % cream, Apply topically every evening., Disp: 20 g, Rfl: 3    triamcinolone acetonide 0.1% (KENALOG) 0.1 % cream, Apply topically 2 (two) times daily., Disp: 80 g, Rfl: 1    blood-glucose meter kit, To check BG 1 times daily, to use with insurance preferred meter, Disp: 1 each, Rfl: 0    cephALEXin (KEFLEX) 500 MG capsule, Take 1 capsule (500 mg total) by mouth every 8 (eight) hours., Disp: 30 capsule, Rfl: 0    mupirocin (BACTROBAN) 2 % ointment, Apply topically 3 (three) times daily., Disp: 22 g, Rfl: 0    Objective:      Vitals:    11/26/24 1237   BP: 138/74   Pulse: 90   SpO2: 96%   Weight: 64.4 kg (142 lb)   Height: 5' 2.5" (1.588 m)     Physical Exam    General: No acute distress. Well-developed. Well-nourished.    Cardiovascular: Regular rate. Regular rhythm. + murmurs. Respiratory: Normal respiratory effort. Clear to auscultation bilaterally. No rales. No rhonchi. No wheezing.  Abdomen: Soft. Non-tender. Non-distended. Normoactive bowel sounds.  Extremities: No lower extremity edema. Finger with improved range of motion  Neurological: Alert & oriented x3. No slurred speech. Normal gait.  Psychiatric: Normal mood. Normal affect. Good insight. Good judgment.  Skin: Warm. Dry. No rash. Abscess on buttocks. Pus-filled abscess with hard areas underneath. Spongy appearance on backside. Abscess softened after drainage.     " "Tolerated well  Assessment:       Assessment & Plan    - Assessed and drained painful skin abscess on patient's buttocks  - Opted for puncture over incision to minimize infection risk in hard-to-clean area  - Considered antibiotic treatment due to presence of pus  - Evaluated itchy skin condition on buttocks, suspecting possible fungal component    BUTTOCK:  - Emphasized the importance of keeping the drained area clean.  - Kay to take warm baths to help soften the affected area.  - Kay to use heating pad on affected area to soften tissues.  - Started Keflex for skin infection.  - Contact the office tomorrow if abscess is not improved.  - Follow up tomorrow if needed for potential incision and drainage.    PRURITUS ANI:  - Continued triamcinolone cream for itchy skin condition.    CANDIDIASIS:  - Discussed the potential benefits of adding an anti-fungal component to current skin treatment.  - Started antifungal cream for buttocks area, apply 3 times daily for 2 weeks.  - Started oral antifungal medication (referred to as "yeast pill").       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 keflex    Hypertension, unspecified type    Mixed hyperlipidemia  Comments:  crestor    Primary osteoarthritis involving multiple joints    Anxiety  Comments:  xanax prn    Candidiasis  Comments:  diflucan x 3 days    Other orders  -     cephALEXin (KEFLEX) 500 MG capsule; Take 1 capsule (500 mg total) by mouth every 8 (eight) hours.  Dispense: 30 capsule; Refill: 0  -     mupirocin (BACTROBAN) 2 % ointment; Apply topically 3 (three) times daily.  Dispense: 22 g; Refill: 0  -     fluconazole (DIFLUCAN) 100 MG tablet; Take 1 tablet (100 mg total) by mouth once daily. for 3 days  Dispense: 3 tablet; Refill: 0      Follow up in about 3 months " (around 2/26/2025), or if symptoms worsen or fail to improve, for medication management, Diabetic Check-Up.        This note was generated with the assistance of ambient listening technology. Verbal consent was obtained by the patient and accompanying visitor(s) for the recording of patient appointment to facilitate this note. I attest to having reviewed and edited the generated note for accuracy, though some syntax or spelling errors may persist. Please contact the author of this note for any clarification.      12/3/2024 Karolina Ramirez

## 2024-12-19 DIAGNOSIS — E11.65 UNCONTROLLED TYPE 2 DIABETES MELLITUS WITH HYPERGLYCEMIA: ICD-10-CM

## 2024-12-19 NOTE — TELEPHONE ENCOUNTER
----- Message from Barbara sent at 12/19/2024 12:53 PM CST -----  Pt wants her lab results. Refill for test strips. Ignacio. Pt #[phone]

## 2024-12-19 NOTE — TELEPHONE ENCOUNTER
Spoke with patient, she wants the results from her ultrasound - in media. And test strips to the pharmacy.

## 2024-12-19 NOTE — TELEPHONE ENCOUNTER
"Per Karolina "right benign nodules. Left solid nodule. Will repeat in 6 months to reassess size."   "

## 2024-12-31 ENCOUNTER — TELEPHONE (OUTPATIENT)
Dept: FAMILY MEDICINE | Facility: CLINIC | Age: 74
End: 2024-12-31
Payer: MEDICARE

## 2024-12-31 DIAGNOSIS — F41.9 ANXIETY: ICD-10-CM

## 2024-12-31 RX ORDER — ALPRAZOLAM 1 MG/1
1 TABLET ORAL NIGHTLY
Qty: 30 TABLET | Refills: 1 | Status: SHIPPED | OUTPATIENT
Start: 2025-01-01

## 2024-12-31 NOTE — TELEPHONE ENCOUNTER
----- Message from Olga sent at 12/31/2024 12:21 PM CST -----  The patient just wanted to make sure her Xanax gets called in today. Her pharmacy closes at 5 today. Pt's # [phone] GH

## 2024-12-31 NOTE — TELEPHONE ENCOUNTER
----- Message from Kimberly sent at 12/31/2024  8:00 AM CST -----  Refill on xanax   [facility]- [address] in [place]   [phone]

## 2025-01-30 ENCOUNTER — TELEPHONE (OUTPATIENT)
Dept: FAMILY MEDICINE | Facility: CLINIC | Age: 75
End: 2025-01-30
Payer: MEDICARE

## 2025-01-30 DIAGNOSIS — E78.2 MIXED HYPERLIPIDEMIA: ICD-10-CM

## 2025-01-30 DIAGNOSIS — E11.65 UNCONTROLLED TYPE 2 DIABETES MELLITUS WITH HYPERGLYCEMIA: Primary | ICD-10-CM

## 2025-01-30 DIAGNOSIS — E11.3293 TYPE 2 DIABETES MELLITUS WITH BOTH EYES AFFECTED BY MILD NONPROLIFERATIVE RETINOPATHY WITHOUT MACULAR EDEMA, WITHOUT LONG-TERM CURRENT USE OF INSULIN: ICD-10-CM

## 2025-01-30 DIAGNOSIS — I10 HYPERTENSION, UNSPECIFIED TYPE: ICD-10-CM

## 2025-01-30 DIAGNOSIS — Z51.81 ENCOUNTER FOR THERAPEUTIC DRUG MONITORING: ICD-10-CM

## 2025-02-14 ENCOUNTER — PATIENT MESSAGE (OUTPATIENT)
Dept: FAMILY MEDICINE | Facility: CLINIC | Age: 75
End: 2025-02-14
Payer: MEDICARE

## 2025-02-14 ENCOUNTER — TELEPHONE (OUTPATIENT)
Dept: FAMILY MEDICINE | Facility: CLINIC | Age: 75
End: 2025-02-14
Payer: MEDICARE

## 2025-02-14 DIAGNOSIS — H44.009 BACTERIAL INFECTION OF EYE: Primary | ICD-10-CM

## 2025-02-14 DIAGNOSIS — B96.89 BACTERIAL INFECTION OF EYE: Primary | ICD-10-CM

## 2025-02-14 RX ORDER — ERYTHROMYCIN 5 MG/G
OINTMENT OPHTHALMIC EVERY 8 HOURS
Qty: 1 G | Refills: 0 | Status: SHIPPED | OUTPATIENT
Start: 2025-02-14 | End: 2025-02-19

## 2025-02-14 NOTE — TELEPHONE ENCOUNTER
----- Message from Med Assistant Webb sent at 2/14/2025  8:35 AM CST -----  Pt have a stye on left eye with swelling requesting medication      # [phone]

## 2025-02-14 NOTE — TELEPHONE ENCOUNTER
Spoke to pt and she is complaining of a stye on her left eye has been there for a few days. She has tried warm compress. Eye is started to get swollen.

## 2025-02-18 ENCOUNTER — OFFICE VISIT (OUTPATIENT)
Dept: FAMILY MEDICINE | Facility: CLINIC | Age: 75
End: 2025-02-18
Payer: MEDICARE

## 2025-02-18 DIAGNOSIS — N18.31 CHRONIC KIDNEY DISEASE, STAGE 3A: ICD-10-CM

## 2025-02-18 DIAGNOSIS — D64.9 ANEMIA, UNSPECIFIED TYPE: Primary | ICD-10-CM

## 2025-02-18 DIAGNOSIS — E78.2 ELEVATED TRIGLYCERIDES WITH HIGH CHOLESTEROL: ICD-10-CM

## 2025-02-18 DIAGNOSIS — H00.015 HORDEOLUM EXTERNUM LEFT LOWER EYELID: ICD-10-CM

## 2025-02-18 DIAGNOSIS — E78.2 MIXED HYPERLIPIDEMIA: ICD-10-CM

## 2025-02-18 DIAGNOSIS — F41.9 ANXIETY: ICD-10-CM

## 2025-02-18 DIAGNOSIS — M15.9 GENERALIZED OA: ICD-10-CM

## 2025-02-18 DIAGNOSIS — L73.9 FOLLICULITIS: ICD-10-CM

## 2025-02-18 DIAGNOSIS — I10 HYPERTENSION, UNSPECIFIED TYPE: ICD-10-CM

## 2025-02-18 DIAGNOSIS — Z79.899 OTHER LONG TERM (CURRENT) DRUG THERAPY: ICD-10-CM

## 2025-02-18 DIAGNOSIS — E11.3293 TYPE 2 DIABETES MELLITUS WITH BOTH EYES AFFECTED BY MILD NONPROLIFERATIVE RETINOPATHY WITHOUT MACULAR EDEMA, WITHOUT LONG-TERM CURRENT USE OF INSULIN: ICD-10-CM

## 2025-02-18 PROCEDURE — 3060F POS MICROALBUMINURIA REV: CPT | Mod: CPTII,S$GLB,, | Performed by: NURSE PRACTITIONER

## 2025-02-18 PROCEDURE — 3288F FALL RISK ASSESSMENT DOCD: CPT | Mod: CPTII,S$GLB,, | Performed by: NURSE PRACTITIONER

## 2025-02-18 PROCEDURE — 1160F RVW MEDS BY RX/DR IN RCRD: CPT | Mod: CPTII,S$GLB,, | Performed by: NURSE PRACTITIONER

## 2025-02-18 PROCEDURE — 3066F NEPHROPATHY DOC TX: CPT | Mod: CPTII,S$GLB,, | Performed by: NURSE PRACTITIONER

## 2025-02-18 PROCEDURE — 3044F HG A1C LEVEL LT 7.0%: CPT | Mod: CPTII,S$GLB,, | Performed by: NURSE PRACTITIONER

## 2025-02-18 PROCEDURE — 1159F MED LIST DOCD IN RCRD: CPT | Mod: CPTII,S$GLB,, | Performed by: NURSE PRACTITIONER

## 2025-02-18 PROCEDURE — 3078F DIAST BP <80 MM HG: CPT | Mod: CPTII,S$GLB,, | Performed by: NURSE PRACTITIONER

## 2025-02-18 PROCEDURE — 1101F PT FALLS ASSESS-DOCD LE1/YR: CPT | Mod: CPTII,S$GLB,, | Performed by: NURSE PRACTITIONER

## 2025-02-18 PROCEDURE — 4010F ACE/ARB THERAPY RXD/TAKEN: CPT | Mod: CPTII,S$GLB,, | Performed by: NURSE PRACTITIONER

## 2025-02-18 PROCEDURE — 1126F AMNT PAIN NOTED NONE PRSNT: CPT | Mod: CPTII,S$GLB,, | Performed by: NURSE PRACTITIONER

## 2025-02-18 PROCEDURE — 3008F BODY MASS INDEX DOCD: CPT | Mod: CPTII,S$GLB,, | Performed by: NURSE PRACTITIONER

## 2025-02-18 PROCEDURE — 3075F SYST BP GE 130 - 139MM HG: CPT | Mod: CPTII,S$GLB,, | Performed by: NURSE PRACTITIONER

## 2025-02-18 PROCEDURE — 99214 OFFICE O/P EST MOD 30 MIN: CPT | Mod: S$GLB,,, | Performed by: NURSE PRACTITIONER

## 2025-02-18 RX ORDER — GLIPIZIDE 5 MG/1
5 TABLET ORAL
Qty: 90 TABLET | Refills: 1 | Status: SHIPPED | OUTPATIENT
Start: 2025-02-18 | End: 2026-02-18

## 2025-02-18 RX ORDER — DOXYCYCLINE 100 MG/1
100 CAPSULE ORAL 2 TIMES DAILY
Qty: 14 CAPSULE | Refills: 0 | Status: SHIPPED | OUTPATIENT
Start: 2025-02-18 | End: 2025-02-19 | Stop reason: SDUPTHER

## 2025-02-18 RX ORDER — MELOXICAM 15 MG/1
15 TABLET ORAL DAILY
Qty: 90 TABLET | Refills: 1 | Status: SHIPPED | OUTPATIENT
Start: 2025-02-18

## 2025-02-18 RX ORDER — LOSARTAN POTASSIUM 100 MG/1
100 TABLET ORAL DAILY
Qty: 90 TABLET | Refills: 0 | Status: SHIPPED | OUTPATIENT
Start: 2025-02-18 | End: 2025-05-19

## 2025-02-18 RX ORDER — ALPRAZOLAM 1 MG/1
1 TABLET ORAL NIGHTLY
Qty: 30 TABLET | Refills: 1 | Status: SHIPPED | OUTPATIENT
Start: 2025-02-18

## 2025-02-18 RX ORDER — KRILL/OM-3/DHA/EPA/PHOSPHO/AST 1000-170MG
1 CAPSULE ORAL 2 TIMES DAILY
Qty: 60 CAPSULE | Refills: 1 | Status: SHIPPED | OUTPATIENT
Start: 2025-02-18

## 2025-02-18 RX ORDER — POTASSIUM CHLORIDE 750 MG/1
10 CAPSULE, EXTENDED RELEASE ORAL DAILY
Qty: 90 CAPSULE | Refills: 3 | Status: SHIPPED | OUTPATIENT
Start: 2025-02-18

## 2025-02-19 DIAGNOSIS — L73.9 FOLLICULITIS: ICD-10-CM

## 2025-02-19 RX ORDER — DOXYCYCLINE 100 MG/1
100 CAPSULE ORAL 2 TIMES DAILY
Qty: 14 CAPSULE | Refills: 0 | Status: SHIPPED | OUTPATIENT
Start: 2025-02-19

## 2025-02-19 NOTE — TELEPHONE ENCOUNTER
----- Message from Olga sent at 2/19/2025  8:24 AM CST -----  The patient was seen yesterday. Her antibiotic was sent to [facility]. It was suppose to be sent to [facility] [address] in [place]. The patient asked if this can be called in this morning. PT'S # [phone]

## 2025-02-20 LAB
FERRITIN SERPL-MCNC: 102 NG/ML (ref 16–288)
FOLATE SERPL-MCNC: 15 NG/ML
IRON SATN MFR SERPL: 27 % (CALC) (ref 16–45)
IRON SERPL-MCNC: 83 MCG/DL (ref 45–160)
TIBC SERPL-MCNC: 313 MCG/DL (CALC) (ref 250–450)

## 2025-02-22 DIAGNOSIS — Z00.00 ENCOUNTER FOR MEDICARE ANNUAL WELLNESS EXAM: ICD-10-CM

## 2025-02-23 ENCOUNTER — RESULTS FOLLOW-UP (OUTPATIENT)
Dept: FAMILY MEDICINE | Facility: CLINIC | Age: 75
End: 2025-02-23

## 2025-02-23 VITALS
WEIGHT: 143.81 LBS | HEART RATE: 93 BPM | BODY MASS INDEX: 25.48 KG/M2 | OXYGEN SATURATION: 100 % | DIASTOLIC BLOOD PRESSURE: 72 MMHG | HEIGHT: 63 IN | SYSTOLIC BLOOD PRESSURE: 134 MMHG

## 2025-02-23 PROBLEM — N18.31 CHRONIC KIDNEY DISEASE, STAGE 3A: Status: ACTIVE | Noted: 2025-02-23

## 2025-02-24 NOTE — PROGRESS NOTES
SUBJECTIVE:    Patient ID: Kay Valencia is a 74 y.o. female.    Chief Complaint: Follow-up (Bottles brought//Pt is here for a 3 month follow up and sty to the left eye-has been there for almost a week and also has sores on her scalp//KE)    History of Present Illness    CHIEF COMPLAINT:  74 year old female presents today for a check up    EYE CONDITION:  She developed a sty several days prior to seeking care last week and was prescribed erythromycin at that time. The sty has begun draining pus.    DERMATOLOGIC:  She reports scalp itching and scabbing that developed after recent hair dyeing. The affected areas are described as hive-like with associated nodules.    MEDICATIONS:  She is currently taking 1.5 of cholesterol medication.    ALLERGIES:  She reports allergies to Sulfa, Augmentin, Keflex, and Flexeril.    DIET:  She primarily drinks water and consumes multigrain bread. She occasionally eats small amounts of chips.      ROS:  General: -fever, -chills, -fatigue, -weight gain, -weight loss  Eyes: -vision changes, -redness, +discharge  ENT: -ear pain, -nasal congestion, -sore throat  Cardiovascular: -chest pain, -palpitations, -lower extremity edema  Respiratory: -cough, -shortness of breath  Gastrointestinal: -abdominal pain, -nausea, -vomiting, -diarrhea, -constipation, -blood in stool  Genitourinary: -dysuria, -hematuria, -frequency  Musculoskeletal: -joint pain, -muscle pain  Skin: -rash, -lesion, +itching  Neurological: -headache, -dizziness, -numbness, -tingling  Psychiatric: -anxiety, -depression, -sleep difficulty         Orders Only on 02/18/2025   Component Date Value Ref Range Status    Iron 02/18/2025 83  45 - 160 mcg/dL Final    TIBC 02/18/2025 313  250 - 450 mcg/dL (calc) Final    Iron Saturation 02/18/2025 27  16 - 45 % (calc) Final    Ferritin 02/18/2025 102  16 - 288 ng/mL Final    Folate 02/18/2025 15.0  ng/mL Final   Telephone on 01/30/2025   Component Date Value Ref Range Status    WBC  02/11/2025 3.9  3.8 - 10.8 Thousand/uL Final    RBC 02/11/2025 3.61 (L)  3.80 - 5.10 Million/uL Final    Hemoglobin 02/11/2025 10.9 (L)  11.7 - 15.5 g/dL Final    Hematocrit 02/11/2025 34.4 (L)  35.0 - 45.0 % Final    MCV 02/11/2025 95.3  80.0 - 100.0 fL Final    MCH 02/11/2025 30.2  27.0 - 33.0 pg Final    MCHC 02/11/2025 31.7 (L)  32.0 - 36.0 g/dL Final    RDW 02/11/2025 13.1  11.0 - 15.0 % Final    Platelets 02/11/2025 140  140 - 400 Thousand/uL Final    MPV 02/11/2025 11.0  7.5 - 12.5 fL Final    Neutrophils, Abs 02/11/2025 2,184  1,500 - 7,800 cells/uL Final    Lymph # 02/11/2025 1,034  850 - 3,900 cells/uL Final    Mono # 02/11/2025 363  200 - 950 cells/uL Final    Eos # 02/11/2025 281  15 - 500 cells/uL Final    Baso # 02/11/2025 39  0 - 200 cells/uL Final    Neutrophils Relative 02/11/2025 56  % Final    Lymph % 02/11/2025 26.5  % Final    Mono % 02/11/2025 9.3  % Final    Eosinophil % 02/11/2025 7.2  % Final    Basophil % 02/11/2025 1.0  % Final    Glucose 02/11/2025 128 (H)  65 - 99 mg/dL Final    BUN 02/11/2025 27 (H)  7 - 25 mg/dL Final    Creatinine 02/11/2025 1.07 (H)  0.60 - 1.00 mg/dL Final    eGFR 02/11/2025 55 (L)  > OR = 60 mL/min/1.73m2 Final    BUN/Creatinine Ratio 02/11/2025 25 (H)  6 - 22 (calc) Final    Sodium 02/11/2025 138  135 - 146 mmol/L Final    Potassium 02/11/2025 4.1  3.5 - 5.3 mmol/L Final    Chloride 02/11/2025 98  98 - 110 mmol/L Final    CO2 02/11/2025 28  20 - 32 mmol/L Final    Calcium 02/11/2025 10.0  8.6 - 10.4 mg/dL Final    Total Protein 02/11/2025 6.9  6.1 - 8.1 g/dL Final    Albumin 02/11/2025 4.6  3.6 - 5.1 g/dL Final    Globulin, Total 02/11/2025 2.3  1.9 - 3.7 g/dL (calc) Final    Albumin/Globulin Ratio 02/11/2025 2.0  1.0 - 2.5 (calc) Final    Total Bilirubin 02/11/2025 0.6  0.2 - 1.2 mg/dL Final    Alkaline Phosphatase 02/11/2025 81  37 - 153 U/L Final    AST 02/11/2025 22  10 - 35 U/L Final    ALT 02/11/2025 21  6 - 29 U/L Final    Hemoglobin A1C 02/11/2025 6.6 (H)   <5.7 % of total Hgb Final    Cholesterol 02/11/2025 202 (H)  <200 mg/dL Final    HDL 02/11/2025 43 (L)  > OR = 50 mg/dL Final    Triglycerides 02/11/2025 351 (H)  <150 mg/dL Final    LDL Cholesterol 02/11/2025 112 (H)  mg/dL (calc) Final    HDL/Cholesterol Ratio 02/11/2025 4.7  <5.0 (calc) Final    Non HDL Chol. (LDL+VLDL) 02/11/2025 159 (H)  <130 mg/dL (calc) Final    Creatinine, Urine 02/18/2025 40  20 - 275 mg/dL Final    Microalb, Ur 02/18/2025 2.9  See Note: mg/dL Final    Microalb/Creat Ratio 02/18/2025 73 (H)  <30 mg/g creat Final    TSH w/reflex to FT4 02/11/2025 0.55  0.40 - 4.50 mIU/L Final    Color, UA 02/18/2025 YELLOW  YELLOW Final    Appearance, UA 02/18/2025 CLEAR  CLEAR Final    Specific Gravity, UA 02/18/2025 1.012  1.001 - 1.035 Final    pH, UA 02/18/2025 8.0  5.0 - 8.0 Final    Glucose, UA 02/18/2025 NEGATIVE  NEGATIVE Final    Bilirubin, UA 02/18/2025 NEGATIVE  NEGATIVE Final    Ketones, UA 02/18/2025 NEGATIVE  NEGATIVE Final    Occult Blood UA 02/18/2025 NEGATIVE  NEGATIVE Final    Protein, UA 02/18/2025 NEGATIVE  NEGATIVE Final    Nitrite, UA 02/18/2025 NEGATIVE  NEGATIVE Final    Leukocytes, UA 02/18/2025 NEGATIVE  NEGATIVE Final    WBC Casts, UA 02/18/2025 NONE SEEN  < OR = 5 /HPF Final    RBC Casts, UA 02/18/2025 NONE SEEN  < OR = 2 /HPF Final    Squam Epithel, UA 02/18/2025 NONE SEEN  < OR = 5 /HPF Final    Bacteria, UA 02/18/2025 NONE SEEN  NONE SEEN /HPF Final    Hyaline Casts, UA 02/18/2025 NONE SEEN  NONE SEEN /LPF Final    Service Cmt: 02/18/2025 SEE COMMENT   Final    Reflexive Urine Culture 02/18/2025 SEE COMMENT   Final   Office Visit on 11/14/2024   Component Date Value Ref Range Status    Hemoglobin A1C, POC 11/14/2024 6.7  % Final   Lab Visit on 09/10/2024   Component Date Value Ref Range Status    Creatinine 09/10/2024 1.4  0.5 - 1.4 mg/dL Final    eGFR 09/10/2024 39.7 (A)  >60 mL/min/1.73 m^2 Final   Office Visit on 08/31/2024   Component Date Value Ref Range Status    Urine  "Culture, Routine 08/31/2024 Final report   Final    Result 1 08/31/2024 No growth   Final   Patient Outreach on 08/29/2024   Component Date Value Ref Range Status    Left Eye DM Retinopathy 08/27/2024 Positive   Final    Right Eye DM Retinopathy 08/27/2024 Positive   Final       Past Medical History:   Diagnosis Date    Anxiety     Depression     Diabetes mellitus, type 2     GERD (gastroesophageal reflux disease)     Hyperlipidemia     Hypertension     Type 2 diabetes mellitus with mild nonproliferative retinopathy of both eyes without macular edema 08/27/2024     Social History[1]  Past Surgical History:   Procedure Laterality Date    BREAST BIOPSY Left 1990'S    BENIGN    HYSTERECTOMY      INCONTINENCE SURGERY       Family History   Problem Relation Name Age of Onset    Diabetes Maternal Grandmother      Hypertension Maternal Grandmother      Diabetes Father      Breast cancer Mother  57    Hypertension Mother      Diabetes Sister      Ovarian cancer Neg Hx         Tests to Keep You Healthy    Mammogram: Met on 12/4/2024  Eye Exam: Met on 8/27/2024  Colon Cancer Screening: Met on 9/21/2017  Last Blood Pressure <= 139/89 (2/18/2025): NO  Last HbA1c < 8 (02/11/2025): Yes      Review of patient's allergies indicates:   Allergen Reactions    Flexeril [cyclobenzaprine] Other (See Comments)     Unknown was a long time ago.    Keflex [cephalexin] Diarrhea    Sulfa (sulfonamide antibiotics) Nausea Only    Augmentin [amoxicillin-pot clavulanate] Other (See Comments)     Gas     Current Medications[2]    Objective:      Vitals:    02/18/25 1348 02/18/25 1400 02/18/25 1413   BP: (!) 148/70 (!) 144/72 134/72   Pulse: 93     SpO2: 100%     Weight: 65.2 kg (143 lb 12.8 oz)     Height: 5' 2.5" (1.588 m)       Physical Exam    General: No acute distress. Well-developed. Well-nourished.  Eyes: EOMI. Sclerae anicteric. Stye on left lower lid with pus and erythema.  HENT: Normocephalic. Atraumatic. Nares patent. Moist oral " mucosa.  Ears: Bilateral TMs clear. Bilateral EACs clear.  Cardiovascular: Regular rate. Regular rhythm. No murmurs. No rubs. No gallops.   Respiratory: Normal respiratory effort. Clear to auscultation bilaterally. No rales. No rhonchi. No wheezing.  Abdomen: Soft. Non-tender. Non-distended. Normoactive bowel sounds.  Musculoskeletal: No  obvious deformity.  Extremities: No lower extremity edema.  Neurological: Alert & oriented x3. No slurred speech. Normal gait.  Psychiatric: Normal mood. Normal affect. Good insight. Good judgment.  Skin: Warm. Dry. No rash. Folliculitis on scalp.        Assessment:       Assessment & Plan    - Diagnosed folliculitis on scalp based on presentation of small, abscess-like sores  - Determined sty under eye is infected and draining  - Noted anemia is worsening, ordered additional labs to investigate cause  - Assessed elevated triglycerides, likely due to diet     TYPE 2 DIABETES MELLITUS WITH BOTH EYES AFFECTED BY MILD NONPROLIFERATIVE RETINOPATHY WITHOUT MACULAR EDEMA, WITHOUT LONG-TERM CURRENT USE OF INSULIN:  - [facility]  - Monitored hga1c, which is 6.6 considered good.  - Evaluated total cholesterol at 202, with LDL at 112, showing slight improvement from previous readings of 114-115.  - Reviewed cholesterol levels and explained the relationship between triglycerides and total cholesterol.  - Recommend krill oil to help lower triglycerides.  - Examined the patient's eyes.    CHRONIC KIDNEY DISEASE, STAGE 3A:  - Refilled Potassium and Losartan prescriptions and sent to mail order pharmacy.  - Monitored kidney function, which fluctuates between normal and slightly elevated.  - Noted kidney function (GFR) has remained stable at 55 over the past year, though slightly decreased.  - Acknowledged the patient's single kidney status.  - Ordered urine tests to check kidney function, if the patient is able to provide a sample.  - Ordered additional  labs to reevaluate anemia, which may be related to kidney function.    STye:  - Examined the eye, finding a draining pus-filled stye located under the eye on the lid.  - Assessed that it should improve within a few days with current treatment.  - Instructed to apply warm compresses 2-3 times daily and continue erythromycin ointment application using a Q-tip 2-3 times daily.  - Advised to avoid eye makeup while healing and contact the office if not improved by Thursday.    FOLLICULITIS:  - Examined the scalp, finding small infections in hair follicles resembling folliculitis.  - Explained the condition as a possible bacterial infection affecting skin and hair follicles.  - Prescribed doxycycline 100 mg twice daily for treatment.    ANEMIA:  - Noted worsening anemia.  - Ordered complete labs to evaluate anemia and other lab values.    HYPERTRIGLYCERIDEMIA:  - Discussed the relationship between liver function, ammonia levels, and potential neurological symptoms.  - Provided information on triglycerides' connection to diet, particularly carbohydrate intake.  - Recommend dietary changes, including reducing intake of white bread, pasta, and other high-carbohydrate foods to help lower triglycerides.    HYPERLIPIDEMIA:  - Kay is currently taking 1.5 of cholesterol medicine.  - Recalled previous suggestion to increase to 80mg, but patient experienced side effects.    ALLERGIES:  - Noted patient's allergies to several antibiotics including Sulfa, Augmentin, Keflex, and Flexeril.  - Cefalexin causes diarrhea in the patient.  - Prescribed doxycycline instead of other antibiotics due to these allergies.       Plan:       Anemia, unspecified type  Comments:  labs  Orders:  -     Ferritin; Future; Expected date: 02/18/2025  -     Iron and TIBC; Future; Expected date: 02/18/2025  -     FOLATE; Future; Expected date: 02/18/2025    Anxiety  Comments:  continue xanax prn.   Orders:  -     ALPRAZolam (XANAX) 1 MG tablet; Take 1  tablet (1 mg total) by mouth every evening.  Dispense: 30 tablet; Refill: 1  -     glipiZIDE (GLUCOTROL) 5 MG tablet; Take 1 tablet (5 mg total) by mouth daily with breakfast.  Dispense: 90 tablet; Refill: 1  -     potassium chloride (MICRO-K) 10 MEQ CpSR; Take 1 capsule (10 mEq total) by mouth once daily.  Dispense: 90 capsule; Refill: 3    Generalized OA  -     meloxicam (MOBIC) 15 MG tablet; Take 1 tablet (15 mg total) by mouth once daily.  Dispense: 90 tablet; Refill: 1    Mixed hyperlipidemia  Comments:  crestor  Orders:  -     losartan (COZAAR) 100 MG tablet; Take 1 tablet (100 mg total) by mouth once daily.  Dispense: 90 tablet; Refill: 0    Elevated triglycerides with high cholesterol  Comments:  work on diet. add fish oil  Orders:  -     krill-om-3-dha-epa-phospho-ast (KRILL OIL) 1,354-197-21-80 mg Cap; Take 1 Capful by mouth 2 (two) times a day.  Dispense: 60 capsule; Refill: 1    Folliculitis  Comments:  doxycycline  Orders:  -     Discontinue: doxycycline (MONODOX) 100 MG capsule; Take 1 capsule (100 mg total) by mouth 2 (two) times daily.  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  well controlled    Hypertension, unspecified type  Comments:  bp controlled    Hordeolum externum left lower eyelid  Comments:  pustular drainage expressed. cool compresses.      Follow up in about 3 months (around 5/18/2025), or if symptoms worsen or fail to improve, for medication management, Diabetic Check-Up.        This note was generated with the assistance of ambient listening technology. Verbal consent was obtained by the patient and accompanying visitor(s) for the recording of patient appointment to facilitate this note. I attest  to having reviewed and edited the generated note for accuracy, though some syntax or spelling errors may persist. Please contact the author of this note for any clarification.      2/23/2025 Karolina Ramirez           [1]   Social History  Socioeconomic History    Marital status:    Tobacco Use    Smoking status: Former    Smokeless tobacco: Never   Substance and Sexual Activity    Alcohol use: No    Drug use: No    Sexual activity: Yes     Social Drivers of Health     Financial Resource Strain: Low Risk  (9/8/2020)    Overall Financial Resource Strain (CARDIA)     Difficulty of Paying Living Expenses: Not very hard   Transportation Needs: No Transportation Needs (9/8/2020)    PRAPARE - Transportation     Lack of Transportation (Medical): No     Lack of Transportation (Non-Medical): No   Stress: Stress Concern Present (9/8/2020)    [employer] [place] [employer] - Occupational Stress Questionnaire     Feeling of Stress : To some extent   [2]   Current Outpatient Medications:     atorvastatin (LIPITOR) 80 MG tablet, Take 1 tablet (80 mg total) by mouth once daily., Disp: 90 tablet, Rfl: 3    calcium carbonate/vitamin D3 (CALTRATE 600 PLUS D ORAL), Caltrate 600 plus D, Disp: , Rfl:     famotidine (PEPCID) 40 MG tablet, Take 1 tablet (40 mg total) by mouth once daily., Disp: 90 tablet, Rfl: 1    hydrALAZINE (APRESOLINE) 25 MG tablet, Take 1 tablet (25 mg total) by mouth 2 (two) times a day., Disp: 180 tablet, Rfl: 3    hydroCHLOROthiazide (HYDRODIURIL) 12.5 MG Tab, Take 2 tablets (25 mg total) by mouth once daily., Disp: 180 tablet, Rfl: 3    HYDROcodone-acetaminophen (NORCO)  mg per tablet, Take 1 tablet by mouth every 6 (six) hours as needed for Pain., Disp: , Rfl:     Lactobacillus rhamnosus GG (CULTURELLE) 10 billion cell capsule, Take 1 capsule by mouth once daily., Disp: , Rfl:     metFORMIN (GLUCOPHAGE) 500 MG tablet, Take 1 tablet (500 mg total) by mouth 2 (two) times daily., Disp: 180 tablet,  Rfl: 3    mupirocin (BACTROBAN) 2 % ointment, Apply topically 3 (three) times daily., Disp: 22 g, Rfl: 0    ondansetron (ZOFRAN-ODT) 4 MG TbDL, Take 1 tablet (4 mg total) by mouth every 8 (eight) hours as needed., Disp: 30 tablet, Rfl: 0    pantoprazole (PROTONIX) 40 MG tablet, Take 1 tablet (40 mg total) by mouth once daily., Disp: 90 tablet, Rfl: 3    tretinoin (RETIN-A) 0.05 % cream, Apply topically every evening., Disp: 20 g, Rfl: 3    triamcinolone acetonide 0.1% (KENALOG) 0.1 % cream, Apply topically 2 (two) times daily., Disp: 80 g, Rfl: 1    ALPRAZolam (XANAX) 1 MG tablet, Take 1 tablet (1 mg total) by mouth every evening., Disp: 30 tablet, Rfl: 1    blood sugar diagnostic Strp, To check BG 1 times daily, to use with insurance preferred meter (Patient not taking: Reported on 2/18/2025), Disp: 200 strip, Rfl: 1    blood-glucose meter kit, To check BG 1 times daily, to use with insurance preferred meter, Disp: 1 each, Rfl: 0    doxycycline (MONODOX) 100 MG capsule, Take 1 capsule (100 mg total) by mouth 2 (two) times daily., Disp: 14 capsule, Rfl: 0    glipiZIDE (GLUCOTROL) 5 MG tablet, Take 1 tablet (5 mg total) by mouth daily with breakfast., Disp: 90 tablet, Rfl: 1    krill-om-3-dha-epa-phospho-ast (KRILL OIL) 1,732-818-33-80 mg Cap, Take 1 Capful by mouth 2 (two) times a day., Disp: 60 capsule, Rfl: 1    losartan (COZAAR) 100 MG tablet, Take 1 tablet (100 mg total) by mouth once daily., Disp: 90 tablet, Rfl: 0    meloxicam (MOBIC) 15 MG tablet, Take 1 tablet (15 mg total) by mouth once daily., Disp: 90 tablet, Rfl: 1    potassium chloride (MICRO-K) 10 MEQ CpSR, Take 1 capsule (10 mEq total) by mouth once daily., Disp: 90 capsule, Rfl: 3    rosuvastatin (CRESTOR) 40 MG Tab, Take 1 tablet (40 mg total) by mouth every evening. (Patient not taking: Reported on 2/18/2025), Disp: 90 tablet, Rfl: 3

## 2025-03-09 NOTE — TELEPHONE ENCOUNTER
----- Message from Med Assistant Cabrera sent at 10/2/2024  3:16 PM CDT -----  Patient is calling to speak with mendoza, states she is having trouble at the pharmacy with her xanax.     Pt: [phone]   - Per facility RN, mostly bedbound, was in normal state of health night prior, found on floor by staff this AM on side of bed without rail  - With contusion/lac to forehead s/p repair in ED  - CTH/C spine negative for acute bleed or dislocation  - CK WNL, no signs of rhabdo on labs   - XRay of b/l shoulders, XR R hip/femur/knee with no new fx, does have advanced OA and chronic fx  - CT chest/abd did note no acute intrathoracic findings, age-indeterminate mild loss of vertebral body height at L3 since 2023. Compression deformities of T11, L1, L4, L5.   - XR LS as reported: Redemonstrated severe T12, moderate to severe L4, mild to moderate L3, and mild T11 superior endplate compression fracture deformities without gross progressive collapse. Remaining visualized vertebral body heights maintained. Redemonstrated multilevel degenerative changes including lower lumbar predominant posterior facet arthrosis. Partially visualized right total hip prosthesis and left hip fracture fixation hardware. Generalized osteopenia otherwise no discrete lytic or blastic lesions.  - Ortho recommendations appreciated: "88y Female with chronic Compression deformities of T11, L1, L4, and L5 and new L3 VCF; Pain control; WBAT with assistive devices as needed; TLSO for comfort; medical management per primary team; no acute orthopaedic surgical intervention indicated at this time. This patient is orthopaedically stable for discharge.   - Conservative analgesia regimen PRN

## 2025-03-31 DIAGNOSIS — F41.9 ANXIETY: ICD-10-CM

## 2025-03-31 RX ORDER — ALPRAZOLAM 1 MG/1
1 TABLET ORAL NIGHTLY
Qty: 30 TABLET | Refills: 1 | Status: SHIPPED | OUTPATIENT
Start: 2025-03-31

## 2025-03-31 NOTE — TELEPHONE ENCOUNTER
----- Message from Azul sent at 3/31/2025  3:38 PM CDT -----  Pt xanax is not available [facility] can you please call it into [facility] [phone]

## 2025-04-04 ENCOUNTER — TELEPHONE (OUTPATIENT)
Dept: FAMILY MEDICINE | Facility: CLINIC | Age: 75
End: 2025-04-04
Payer: MEDICARE

## 2025-04-04 RX ORDER — DOXYCYCLINE HYCLATE 100 MG
100 TABLET ORAL 2 TIMES DAILY
Qty: 14 TABLET | Refills: 0 | Status: SHIPPED | OUTPATIENT
Start: 2025-04-04

## 2025-04-04 NOTE — TELEPHONE ENCOUNTER
Spoke with patient, states she's not able to send pictures because they are on the back of her head. States eva already knows about it and has treated it before.  Per eva, can send abx this time but will need to be seen if they are not getting better or reoccur. Patient aware.

## 2025-04-04 NOTE — TELEPHONE ENCOUNTER
----- Message from Azul sent at 4/4/2025 11:18 AM CDT -----  Pt spots are popping up on the back of head, would like to know if she can get on antibiotics again or have to be seen [phone]

## 2025-05-05 ENCOUNTER — TELEPHONE (OUTPATIENT)
Dept: FAMILY MEDICINE | Facility: CLINIC | Age: 75
End: 2025-05-05

## 2025-05-05 ENCOUNTER — OFFICE VISIT (OUTPATIENT)
Dept: FAMILY MEDICINE | Facility: CLINIC | Age: 75
End: 2025-05-05
Payer: MEDICARE

## 2025-05-05 VITALS
HEIGHT: 63 IN | BODY MASS INDEX: 25.16 KG/M2 | WEIGHT: 142 LBS | SYSTOLIC BLOOD PRESSURE: 130 MMHG | OXYGEN SATURATION: 97 % | TEMPERATURE: 99 F | HEART RATE: 81 BPM | DIASTOLIC BLOOD PRESSURE: 64 MMHG

## 2025-05-05 DIAGNOSIS — L02.91 ABSCESS: Primary | ICD-10-CM

## 2025-05-05 PROCEDURE — 3078F DIAST BP <80 MM HG: CPT | Mod: CPTII,S$GLB,,

## 2025-05-05 PROCEDURE — 4010F ACE/ARB THERAPY RXD/TAKEN: CPT | Mod: CPTII,S$GLB,,

## 2025-05-05 PROCEDURE — 99213 OFFICE O/P EST LOW 20 MIN: CPT | Mod: S$GLB,,,

## 2025-05-05 PROCEDURE — 3051F HG A1C>EQUAL 7.0%<8.0%: CPT | Mod: CPTII,S$GLB,,

## 2025-05-05 PROCEDURE — 3008F BODY MASS INDEX DOCD: CPT | Mod: CPTII,S$GLB,,

## 2025-05-05 PROCEDURE — 3060F POS MICROALBUMINURIA REV: CPT | Mod: CPTII,S$GLB,,

## 2025-05-05 PROCEDURE — 3075F SYST BP GE 130 - 139MM HG: CPT | Mod: CPTII,S$GLB,,

## 2025-05-05 PROCEDURE — 3066F NEPHROPATHY DOC TX: CPT | Mod: CPTII,S$GLB,,

## 2025-05-05 RX ORDER — CLINDAMYCIN HYDROCHLORIDE 300 MG/1
300 CAPSULE ORAL 3 TIMES DAILY
Qty: 21 CAPSULE | Refills: 0 | Status: SHIPPED | OUTPATIENT
Start: 2025-05-05 | End: 2025-05-12

## 2025-05-05 NOTE — TELEPHONE ENCOUNTER
----- Message from Kimberly sent at 5/5/2025 10:35 AM CDT -----  Patient was placed on Nanda schedule for today due to sore on her chin. [phone]

## 2025-05-05 NOTE — PATIENT INSTRUCTIONS
M-W-F:Maintenance for ear wax or dry/itchy ear canals: Use mineral oil. Tilt head to the side (tilt head to right to apply in left ear and tilt head to left to apply in right ear) and place 3-4 drops in affected ear canal(s) and let sit for around 30 seconds to 1 minute and then bring head up and dab excess oil as it comes from ear canals with clean kleenex. Do this in the evening. This can help soften cerumen (ear wax) and help with the ear's natural exfoliation process to help clear ear wax. Ear cleaning still may be needed even with maintenance drops to help with cerumen (ear wax). If suffering from excessive ear wax, then over the counter debrox drops may be used and if not effective in getting rid of suspected cerumen (ear wax), then follow up in office for further assessment. This can also help as maintenance for individual that suffer from dry itchy ear canals and can also be done in the evening to help with maintenance of dry itchy ear canals). Please note that this is a handout for individuals that want better control of cerumen (ear wax) and for individuals that suffer from chronic dry itchy ear canals and can be used for individuals who suffer with both.

## 2025-05-05 NOTE — PROGRESS NOTES
SUBJECTIVE:    Patient ID: Kay Valencia is a 74 y.o. female.    Chief Complaint: Sore On Chin x1 Week    HPI  History of Present Illness    CHIEF COMPLAINT:  Kay presents today for evaluation of a facial lesion    CURRENT SKIN LESION:  She presents with a facial lesion on chin that initially appeared as a pimple with pustular head. She reports pus drainage from a hole-like opening the previous night. The lesion is currently scabbed with surrounding erythema. She denies pruritus.    HISTORY OF SKIN LESIONS:  She reports history of similar lesions on scalp. She has completed two courses of doxycycline in the past two months for treatment of prior lesions. She attempted self-treatment of current facial lesion with Cipro, though exact duration and dosage are unclear.    ENT:  She reports intermittent ear pain associated with weather changes.    ALLERGIES:  She reports allergy to Augmentin.        ROS:  General: -fever, -chills, -fatigue, -weight gain, -weight loss  Eyes: -vision changes, -redness, -discharge  ENT: +ear pain, -nasal congestion, -sore throat  Cardiovascular: -chest pain, -palpitations, -lower extremity edema  Respiratory: -cough, -shortness of breath  Gastrointestinal: -abdominal pain, -nausea, -vomiting, -diarrhea, -constipation, -blood in stool  Genitourinary: -dysuria, -hematuria, -frequency  Musculoskeletal: -joint pain, -muscle pain  Skin: -rash, +lesion  Neurological: -headache, -dizziness, -numbness, -tingling  Psychiatric: -anxiety, -depression, -sleep difficulty         Orders Only on 02/18/2025   Component Date Value Ref Range Status    Iron 02/18/2025 83  45 - 160 mcg/dL Final    TIBC 02/18/2025 313  250 - 450 mcg/dL (calc) Final    Iron Saturation 02/18/2025 27  16 - 45 % (calc) Final    Ferritin 02/18/2025 102  16 - 288 ng/mL Final    Folate 02/18/2025 15.0  ng/mL Final   Telephone on 01/30/2025   Component Date Value Ref Range Status    WBC 02/11/2025 3.9  3.8 - 10.8 Thousand/uL Final     RBC 02/11/2025 3.61 (L)  3.80 - 5.10 Million/uL Final    Hemoglobin 02/11/2025 10.9 (L)  11.7 - 15.5 g/dL Final    Hematocrit 02/11/2025 34.4 (L)  35.0 - 45.0 % Final    MCV 02/11/2025 95.3  80.0 - 100.0 fL Final    MCH 02/11/2025 30.2  27.0 - 33.0 pg Final    MCHC 02/11/2025 31.7 (L)  32.0 - 36.0 g/dL Final    RDW 02/11/2025 13.1  11.0 - 15.0 % Final    Platelets 02/11/2025 140  140 - 400 Thousand/uL Final    MPV 02/11/2025 11.0  7.5 - 12.5 fL Final    Neutrophils, Abs 02/11/2025 2,184  1,500 - 7,800 cells/uL Final    Lymph # 02/11/2025 1,034  850 - 3,900 cells/uL Final    Mono # 02/11/2025 363  200 - 950 cells/uL Final    Eos # 02/11/2025 281  15 - 500 cells/uL Final    Baso # 02/11/2025 39  0 - 200 cells/uL Final    Neutrophils Relative 02/11/2025 56  % Final    Lymph % 02/11/2025 26.5  % Final    Mono % 02/11/2025 9.3  % Final    Eosinophil % 02/11/2025 7.2  % Final    Basophil % 02/11/2025 1.0  % Final    Glucose 02/11/2025 128 (H)  65 - 99 mg/dL Final    BUN 02/11/2025 27 (H)  7 - 25 mg/dL Final    Creatinine 02/11/2025 1.07 (H)  0.60 - 1.00 mg/dL Final    eGFR 02/11/2025 55 (L)  > OR = 60 mL/min/1.73m2 Final    BUN/Creatinine Ratio 02/11/2025 25 (H)  6 - 22 (calc) Final    Sodium 02/11/2025 138  135 - 146 mmol/L Final    Potassium 02/11/2025 4.1  3.5 - 5.3 mmol/L Final    Chloride 02/11/2025 98  98 - 110 mmol/L Final    CO2 02/11/2025 28  20 - 32 mmol/L Final    Calcium 02/11/2025 10.0  8.6 - 10.4 mg/dL Final    Total Protein 02/11/2025 6.9  6.1 - 8.1 g/dL Final    Albumin 02/11/2025 4.6  3.6 - 5.1 g/dL Final    Globulin, Total 02/11/2025 2.3  1.9 - 3.7 g/dL (calc) Final    Albumin/Globulin Ratio 02/11/2025 2.0  1.0 - 2.5 (calc) Final    Total Bilirubin 02/11/2025 0.6  0.2 - 1.2 mg/dL Final    Alkaline Phosphatase 02/11/2025 81  37 - 153 U/L Final    AST 02/11/2025 22  10 - 35 U/L Final    ALT 02/11/2025 21  6 - 29 U/L Final    Hemoglobin A1C 02/11/2025 6.6 (H)  <5.7 % of total Hgb Final    Cholesterol  02/11/2025 202 (H)  <200 mg/dL Final    HDL 02/11/2025 43 (L)  > OR = 50 mg/dL Final    Triglycerides 02/11/2025 351 (H)  <150 mg/dL Final    LDL Cholesterol 02/11/2025 112 (H)  mg/dL (calc) Final    HDL/Cholesterol Ratio 02/11/2025 4.7  <5.0 (calc) Final    Non HDL Chol. (LDL+VLDL) 02/11/2025 159 (H)  <130 mg/dL (calc) Final    Creatinine, Urine 02/18/2025 40  20 - 275 mg/dL Final    Microalb, Ur 02/18/2025 2.9  See Note: mg/dL Final    Microalb/Creat Ratio 02/18/2025 73 (H)  <30 mg/g creat Final    TSH w/reflex to FT4 02/11/2025 0.55  0.40 - 4.50 mIU/L Final    Color, UA 02/18/2025 YELLOW  YELLOW Final    Appearance, UA 02/18/2025 CLEAR  CLEAR Final    Specific Gravity, UA 02/18/2025 1.012  1.001 - 1.035 Final    pH, UA 02/18/2025 8.0  5.0 - 8.0 Final    Glucose, UA 02/18/2025 NEGATIVE  NEGATIVE Final    Bilirubin, UA 02/18/2025 NEGATIVE  NEGATIVE Final    Ketones, UA 02/18/2025 NEGATIVE  NEGATIVE Final    Occult Blood UA 02/18/2025 NEGATIVE  NEGATIVE Final    Protein, UA 02/18/2025 NEGATIVE  NEGATIVE Final    Nitrite, UA 02/18/2025 NEGATIVE  NEGATIVE Final    Leukocytes, UA 02/18/2025 NEGATIVE  NEGATIVE Final    WBC Casts, UA 02/18/2025 NONE SEEN  < OR = 5 /HPF Final    RBC Casts, UA 02/18/2025 NONE SEEN  < OR = 2 /HPF Final    Squam Epithel, UA 02/18/2025 NONE SEEN  < OR = 5 /HPF Final    Bacteria, UA 02/18/2025 NONE SEEN  NONE SEEN /HPF Final    Hyaline Casts, UA 02/18/2025 NONE SEEN  NONE SEEN /LPF Final    Service Cmt: 02/18/2025 SEE COMMENT   Final    Reflexive Urine Culture 02/18/2025 SEE COMMENT   Final   Office Visit on 11/14/2024   Component Date Value Ref Range Status    Hemoglobin A1C, POC 11/14/2024 6.7  % Final       Past Medical History:   Diagnosis Date    Anxiety     Depression     Diabetes mellitus, type 2     GERD (gastroesophageal reflux disease)     Hyperlipidemia     Hypertension     Type 2 diabetes mellitus with mild nonproliferative retinopathy of both eyes without macular edema 08/27/2024  "    Social History[1]  Past Surgical History:   Procedure Laterality Date    BREAST BIOPSY Left 1990'S    BENIGN    HYSTERECTOMY      INCONTINENCE SURGERY       Family History   Problem Relation Name Age of Onset    Diabetes Maternal Grandmother      Hypertension Maternal Grandmother      Diabetes Father      Breast cancer Mother  57    Hypertension Mother      Diabetes Sister      Ovarian cancer Neg Hx         The 10-year CVD risk score (Amanda et al., 2008) is: 31.1%    Values used to calculate the score:      Age: 74 years      Sex: Female      Diabetic: Yes      Tobacco smoker: No      Systolic Blood Pressure: 130 mmHg      Is BP treated: Yes      HDL Cholesterol: 43 mg/dL      Total Cholesterol: 202 mg/dL    All of your core healthy metrics are met.      Review of patient's allergies indicates:   Allergen Reactions    Flexeril [cyclobenzaprine] Other (See Comments)     Unknown was a long time ago.    Keflex [cephalexin] Diarrhea    Sulfa (sulfonamide antibiotics) Nausea Only    Augmentin [amoxicillin-pot clavulanate] Other (See Comments)     Gas     Current Medications[2]    Review of Systems        Objective:      Vitals:    05/05/25 1545 05/05/25 1618   BP: (!) 176/62 130/64   Pulse: 81    Temp: 98.5 °F (36.9 °C)    SpO2: 97%    Weight: 64.4 kg (142 lb)    Height: 5' 2.5" (1.588 m)      Physical Exam  Constitutional:       Appearance: Normal appearance.   HENT:      Right Ear: There is impacted cerumen.      Left Ear: There is impacted cerumen.   Skin:     Findings: Lesion present.          Neurological:      Mental Status: She is alert.                    Assessment:       1. Abscess         Plan:       Abscess  -     clindamycin (CLEOCIN) 300 MG capsule; Take 1 capsule (300 mg total) by mouth 3 (three) times daily. for 7 days  Dispense: 21 capsule; Refill: 0  -     Culture, Aerobic and Anaerobic w/gram Stain          IMPRESSION:  - Assessed facial lesion as crusty ulcer with previous pus drainage, " rather than typical pimple.  - Considered possibility of systemic issue causing recurrent lesions, given history of 2 recent doxycycline courses.  - Evaluated ear discomfort, found significant wax impaction in 1 ear.    ## CUTANEOUS ABSCESS OF FACE:  - Examined facial lesion that began as a pimple and developed into a crusty, purulent ulcer on the chin.  - Performed culture swab to rule out potential pathogens including Streptococcus.  - Prescribed clindamycin instead of doxycycline due to recurrence despite previous treatments and patient's inability to take Augmentin.  - Discussed need to identify underlying cause of recurrent abscesses, possibly systemic in nature.  - Instructed patient to remove facial bandage upon returning home to prevent skin irritation.    ## IMPACTED CERUMEN, LEFT EAR:  - Examined ears and found left ear completely occluded with cerumen while right ear is clear.  - Kay reports occasional otalgia, likely due to this impaction.  - Recommend Debrox drops and mineral oil to soften and remove the impacted cerumen.  - Explained risks of using cotton-tipped applicators which can push cerumen deeper into the canal.  - Provided detailed instructions on cerumen removal using ENT-recommended method: tilt head to side, instill 3-4 drops of mineral oil in ear canal, then use twisted tissue to gently remove softened cerumen.        Follow up if symptoms worsen or fail to improve.        This note was generated with the assistance of ambient listening technology. Verbal consent was obtained by the patient and accompanying visitor(s) for the recording of patient appointment to facilitate this note. I attest to having reviewed and edited the generated note for accuracy, though some syntax or spelling errors may persist. Please contact the author of this note for any clarification.      5/5/2025 Azul Gan         [1]   Social History  Socioeconomic History    Marital status:    Tobacco Use     Smoking status: Former    Smokeless tobacco: Never   Substance and Sexual Activity    Alcohol use: No    Drug use: No    Sexual activity: Yes     Social Drivers of Health     Financial Resource Strain: Low Risk  (9/8/2020)    Overall Financial Resource Strain (CARDIA)     Difficulty of Paying Living Expenses: Not very hard   Transportation Needs: No Transportation Needs (9/8/2020)    PRAPARE - Transportation     Lack of Transportation (Medical): No     Lack of Transportation (Non-Medical): No   Stress: Stress Concern Present (9/8/2020)    [employer] - Occupational Stress Questionnaire     Feeling of Stress : To some extent   [2]   Current Outpatient Medications:     ALPRAZolam (XANAX) 1 MG tablet, Take 1 tablet (1 mg total) by mouth every evening., Disp: 30 tablet, Rfl: 1    atorvastatin (LIPITOR) 80 MG tablet, Take 1 tablet (80 mg total) by mouth once daily., Disp: 90 tablet, Rfl: 3    blood sugar diagnostic Strp, To check BG 1 times daily, to use with insurance preferred meter, Disp: 200 strip, Rfl: 1    calcium carbonate/vitamin D3 (CALTRATE 600 PLUS D ORAL), Caltrate 600 plus D, Disp: , Rfl:     doxycycline (MONODOX) 100 MG capsule, Take 1 capsule (100 mg total) by mouth 2 (two) times daily., Disp: 14 capsule, Rfl: 0    doxycycline (VIBRA-TABS) 100 MG tablet, Take 1 tablet (100 mg total) by mouth 2 (two) times daily., Disp: 14 tablet, Rfl: 0    famotidine (PEPCID) 40 MG tablet, Take 1 tablet (40 mg total) by mouth once daily., Disp: 90 tablet, Rfl: 1    glipiZIDE (GLUCOTROL) 5 MG tablet, Take 1 tablet (5 mg total) by mouth daily with breakfast., Disp: 90 tablet, Rfl: 1    hydrALAZINE (APRESOLINE) 25 MG tablet, Take 1 tablet (25 mg total) by mouth 2 (two) times a day., Disp: 180 tablet, Rfl: 3    hydroCHLOROthiazide (HYDRODIURIL) 12.5 MG Tab, Take 2 tablets (25 mg total) by mouth once daily., Disp: 180 tablet, Rfl: 3    HYDROcodone-acetaminophen (NORCO)  mg per tablet, Take  1 tablet by mouth every 6 (six) hours as needed for Pain., Disp: , Rfl:     krill-om-3-dha-epa-phospho-ast (KRILL OIL) 1,249-760-15-80 mg Cap, Take 1 Capful by mouth 2 (two) times a day., Disp: 60 capsule, Rfl: 1    Lactobacillus rhamnosus GG (CULTURELLE) 10 billion cell capsule, Take 1 capsule by mouth once daily., Disp: , Rfl:     losartan (COZAAR) 100 MG tablet, Take 1 tablet (100 mg total) by mouth once daily., Disp: 90 tablet, Rfl: 0    meloxicam (MOBIC) 15 MG tablet, Take 1 tablet (15 mg total) by mouth once daily., Disp: 90 tablet, Rfl: 1    metFORMIN (GLUCOPHAGE) 500 MG tablet, Take 1 tablet (500 mg total) by mouth 2 (two) times daily., Disp: 180 tablet, Rfl: 3    mupirocin (BACTROBAN) 2 % ointment, Apply topically 3 (three) times daily., Disp: 22 g, Rfl: 0    ondansetron (ZOFRAN-ODT) 4 MG TbDL, Take 1 tablet (4 mg total) by mouth every 8 (eight) hours as needed., Disp: 30 tablet, Rfl: 0    pantoprazole (PROTONIX) 40 MG tablet, Take 1 tablet (40 mg total) by mouth once daily., Disp: 90 tablet, Rfl: 3    potassium chloride (MICRO-K) 10 MEQ CpSR, Take 1 capsule (10 mEq total) by mouth once daily., Disp: 90 capsule, Rfl: 3    rosuvastatin (CRESTOR) 40 MG Tab, Take 1 tablet (40 mg total) by mouth every evening., Disp: 90 tablet, Rfl: 3    tretinoin (RETIN-A) 0.05 % cream, Apply topically every evening., Disp: 20 g, Rfl: 3    triamcinolone acetonide 0.1% (KENALOG) 0.1 % cream, Apply topically 2 (two) times daily., Disp: 80 g, Rfl: 1    blood-glucose meter kit, To check BG 1 times daily, to use with insurance preferred meter, Disp: 1 each, Rfl: 0    clindamycin (CLEOCIN) 300 MG capsule, Take 1 capsule (300 mg total) by mouth 3 (three) times daily. for 7 days, Disp: 21 capsule, Rfl: 0

## 2025-05-07 ENCOUNTER — RESULTS FOLLOW-UP (OUTPATIENT)
Dept: FAMILY MEDICINE | Facility: CLINIC | Age: 75
End: 2025-05-07

## 2025-05-07 DIAGNOSIS — R11.0 NAUSEA: ICD-10-CM

## 2025-05-07 DIAGNOSIS — B95.8 STAPHYLOCOCCAL INFECTION: Primary | ICD-10-CM

## 2025-05-07 NOTE — PROGRESS NOTES
Let patient know culture grew staphylococcus aureus. Waiting for sensitivity results, but continue current antibiotic for now.

## 2025-05-08 RX ORDER — ONDANSETRON 4 MG/1
4 TABLET, ORALLY DISINTEGRATING ORAL EVERY 8 HOURS PRN
Qty: 20 TABLET | Refills: 1 | Status: SHIPPED | OUTPATIENT
Start: 2025-05-08

## 2025-05-08 RX ORDER — SULFAMETHOXAZOLE AND TRIMETHOPRIM 800; 160 MG/1; MG/1
1 TABLET ORAL 2 TIMES DAILY
Qty: 14 TABLET | Refills: 0 | Status: SHIPPED | OUTPATIENT
Start: 2025-05-08 | End: 2025-05-15

## 2025-05-08 RX ORDER — MUPIROCIN 20 MG/G
OINTMENT TOPICAL DAILY
Qty: 22 G | Refills: 1 | Status: SHIPPED | OUTPATIENT
Start: 2025-05-08 | End: 2025-06-07

## 2025-05-08 NOTE — PROGRESS NOTES
Culture shows on sensitivity that bacteria is actually resistant to the clindamycin that I put her on.., and strangely enough, is susceptible to tetracycline, which would mean the doxycycline she has taken should have been effective.   Other antibiotics that it appears to be susceptible to are vancomycin and Bactrim, but I see she has an allergy to sulfa. We can do a round of tetracycline, but I don't expect it to work. Can she tolerate trying Bactrim? Maybe with some nausea medication?

## 2025-05-08 NOTE — PROGRESS NOTES
"I really don't. Could send her to dermatology to find potential etiology. Can also do a course of treating for staph colonization, can take a q tip and apply Mupirocin to both nostrils (inside) daily for approximately a month, and reduce possible sources at home by trying the following:  Wash all linens in hot water and an antibacterial detergent- bleach, or color safe bleach alternative  Do not reuse bath towels (don't use, hang to dry and reuse), wash after each use.   Do not let pets sleep in bed with you.   Shower/bathe daily with Hibiclens from the neck down, paying attention to wash areas that accumulate sweat (under arms, breasts).  Do not use "re-usable" bath "sponges" like loofahs.     "

## 2025-05-13 LAB
BACTERIA SPEC AEROBE CULT: ABNORMAL
BACTERIA SPEC ANAEROBE CULT: ABNORMAL

## 2025-05-19 ENCOUNTER — OFFICE VISIT (OUTPATIENT)
Dept: FAMILY MEDICINE | Facility: CLINIC | Age: 75
End: 2025-05-19
Payer: MEDICARE

## 2025-05-19 VITALS
HEIGHT: 63 IN | OXYGEN SATURATION: 98 % | WEIGHT: 142.63 LBS | HEART RATE: 89 BPM | DIASTOLIC BLOOD PRESSURE: 60 MMHG | SYSTOLIC BLOOD PRESSURE: 128 MMHG | BODY MASS INDEX: 25.27 KG/M2

## 2025-05-19 DIAGNOSIS — F41.9 ANXIETY: ICD-10-CM

## 2025-05-19 DIAGNOSIS — K21.9 GASTROESOPHAGEAL REFLUX DISEASE, UNSPECIFIED WHETHER ESOPHAGITIS PRESENT: ICD-10-CM

## 2025-05-19 DIAGNOSIS — I10 ESSENTIAL HYPERTENSION: ICD-10-CM

## 2025-05-19 DIAGNOSIS — E78.2 MIXED HYPERLIPIDEMIA: ICD-10-CM

## 2025-05-19 DIAGNOSIS — E11.3293 TYPE 2 DIABETES MELLITUS WITH BOTH EYES AFFECTED BY MILD NONPROLIFERATIVE RETINOPATHY WITHOUT MACULAR EDEMA, WITHOUT LONG-TERM CURRENT USE OF INSULIN: Primary | ICD-10-CM

## 2025-05-19 DIAGNOSIS — Z86.19 HISTORY OF STAPH INFECTION: ICD-10-CM

## 2025-05-19 LAB — HBA1C MFR BLD: 7.2 %

## 2025-05-19 PROCEDURE — 3066F NEPHROPATHY DOC TX: CPT | Mod: CPTII,S$GLB,, | Performed by: NURSE PRACTITIONER

## 2025-05-19 PROCEDURE — 1125F AMNT PAIN NOTED PAIN PRSNT: CPT | Mod: CPTII,S$GLB,, | Performed by: NURSE PRACTITIONER

## 2025-05-19 PROCEDURE — 1160F RVW MEDS BY RX/DR IN RCRD: CPT | Mod: CPTII,S$GLB,, | Performed by: NURSE PRACTITIONER

## 2025-05-19 PROCEDURE — 3288F FALL RISK ASSESSMENT DOCD: CPT | Mod: CPTII,S$GLB,, | Performed by: NURSE PRACTITIONER

## 2025-05-19 PROCEDURE — 3078F DIAST BP <80 MM HG: CPT | Mod: CPTII,S$GLB,, | Performed by: NURSE PRACTITIONER

## 2025-05-19 PROCEDURE — 3074F SYST BP LT 130 MM HG: CPT | Mod: CPTII,S$GLB,, | Performed by: NURSE PRACTITIONER

## 2025-05-19 PROCEDURE — 3051F HG A1C>EQUAL 7.0%<8.0%: CPT | Mod: CPTII,S$GLB,, | Performed by: NURSE PRACTITIONER

## 2025-05-19 PROCEDURE — 1159F MED LIST DOCD IN RCRD: CPT | Mod: CPTII,S$GLB,, | Performed by: NURSE PRACTITIONER

## 2025-05-19 PROCEDURE — 3008F BODY MASS INDEX DOCD: CPT | Mod: CPTII,S$GLB,, | Performed by: NURSE PRACTITIONER

## 2025-05-19 PROCEDURE — 3060F POS MICROALBUMINURIA REV: CPT | Mod: CPTII,S$GLB,, | Performed by: NURSE PRACTITIONER

## 2025-05-19 PROCEDURE — 83036 HEMOGLOBIN GLYCOSYLATED A1C: CPT | Mod: QW,,, | Performed by: NURSE PRACTITIONER

## 2025-05-19 PROCEDURE — 99214 OFFICE O/P EST MOD 30 MIN: CPT | Mod: S$GLB,,, | Performed by: NURSE PRACTITIONER

## 2025-05-19 PROCEDURE — G2211 COMPLEX E/M VISIT ADD ON: HCPCS | Mod: S$GLB,,, | Performed by: NURSE PRACTITIONER

## 2025-05-19 PROCEDURE — 4010F ACE/ARB THERAPY RXD/TAKEN: CPT | Mod: CPTII,S$GLB,, | Performed by: NURSE PRACTITIONER

## 2025-05-19 PROCEDURE — 1101F PT FALLS ASSESS-DOCD LE1/YR: CPT | Mod: CPTII,S$GLB,, | Performed by: NURSE PRACTITIONER

## 2025-05-19 RX ORDER — ALPRAZOLAM 1 MG/1
1 TABLET ORAL NIGHTLY
Qty: 30 TABLET | Refills: 1 | Status: CANCELLED | OUTPATIENT
Start: 2025-05-19

## 2025-05-27 DIAGNOSIS — F41.9 ANXIETY: ICD-10-CM

## 2025-05-27 RX ORDER — ALPRAZOLAM 1 MG/1
1 TABLET ORAL NIGHTLY
Qty: 30 TABLET | Refills: 2 | Status: SHIPPED | OUTPATIENT
Start: 2025-05-27

## 2025-06-02 RX ORDER — PANTOPRAZOLE SODIUM 40 MG/1
40 TABLET, DELAYED RELEASE ORAL DAILY
Qty: 90 TABLET | Refills: 3 | Status: SHIPPED | OUTPATIENT
Start: 2025-06-02

## 2025-06-02 RX ORDER — FAMOTIDINE 40 MG/1
40 TABLET, FILM COATED ORAL DAILY
Qty: 90 TABLET | Refills: 1 | Status: SHIPPED | OUTPATIENT
Start: 2025-06-02 | End: 2026-06-02

## 2025-06-02 RX ORDER — LOSARTAN POTASSIUM 100 MG/1
100 TABLET ORAL DAILY
Qty: 90 TABLET | Refills: 0 | Status: SHIPPED | OUTPATIENT
Start: 2025-06-02 | End: 2025-08-31

## 2025-06-02 RX ORDER — POTASSIUM CHLORIDE 750 MG/1
10 CAPSULE, EXTENDED RELEASE ORAL DAILY
Qty: 90 CAPSULE | Refills: 3 | Status: SHIPPED | OUTPATIENT
Start: 2025-06-02

## 2025-06-17 ENCOUNTER — OFFICE VISIT (OUTPATIENT)
Dept: FAMILY MEDICINE | Facility: CLINIC | Age: 75
End: 2025-06-17
Payer: MEDICARE

## 2025-06-17 VITALS
HEART RATE: 70 BPM | DIASTOLIC BLOOD PRESSURE: 60 MMHG | SYSTOLIC BLOOD PRESSURE: 118 MMHG | HEIGHT: 63 IN | BODY MASS INDEX: 24.24 KG/M2 | WEIGHT: 136.81 LBS | OXYGEN SATURATION: 97 %

## 2025-06-17 DIAGNOSIS — K21.9 GASTROESOPHAGEAL REFLUX DISEASE, UNSPECIFIED WHETHER ESOPHAGITIS PRESENT: ICD-10-CM

## 2025-06-17 DIAGNOSIS — M15.9 GENERALIZED OA: ICD-10-CM

## 2025-06-17 DIAGNOSIS — F41.9 ANXIETY: ICD-10-CM

## 2025-06-17 DIAGNOSIS — E78.2 MIXED HYPERLIPIDEMIA: ICD-10-CM

## 2025-06-17 DIAGNOSIS — E11.3293 TYPE 2 DIABETES MELLITUS WITH BOTH EYES AFFECTED BY MILD NONPROLIFERATIVE RETINOPATHY WITHOUT MACULAR EDEMA, WITHOUT LONG-TERM CURRENT USE OF INSULIN: Primary | ICD-10-CM

## 2025-06-17 DIAGNOSIS — I10 ESSENTIAL HYPERTENSION: ICD-10-CM

## 2025-06-17 LAB — HBA1C MFR BLD: 6.1 %

## 2025-06-17 PROCEDURE — G2211 COMPLEX E/M VISIT ADD ON: HCPCS | Mod: S$GLB,,, | Performed by: NURSE PRACTITIONER

## 2025-06-17 PROCEDURE — 3066F NEPHROPATHY DOC TX: CPT | Mod: CPTII,S$GLB,, | Performed by: NURSE PRACTITIONER

## 2025-06-17 PROCEDURE — 4010F ACE/ARB THERAPY RXD/TAKEN: CPT | Mod: CPTII,S$GLB,, | Performed by: NURSE PRACTITIONER

## 2025-06-17 PROCEDURE — 3288F FALL RISK ASSESSMENT DOCD: CPT | Mod: CPTII,S$GLB,, | Performed by: NURSE PRACTITIONER

## 2025-06-17 PROCEDURE — 3044F HG A1C LEVEL LT 7.0%: CPT | Mod: CPTII,S$GLB,, | Performed by: NURSE PRACTITIONER

## 2025-06-17 PROCEDURE — 3074F SYST BP LT 130 MM HG: CPT | Mod: CPTII,S$GLB,, | Performed by: NURSE PRACTITIONER

## 2025-06-17 PROCEDURE — 1159F MED LIST DOCD IN RCRD: CPT | Mod: CPTII,S$GLB,, | Performed by: NURSE PRACTITIONER

## 2025-06-17 PROCEDURE — 3060F POS MICROALBUMINURIA REV: CPT | Mod: CPTII,S$GLB,, | Performed by: NURSE PRACTITIONER

## 2025-06-17 PROCEDURE — 83036 HEMOGLOBIN GLYCOSYLATED A1C: CPT | Mod: QW,,, | Performed by: NURSE PRACTITIONER

## 2025-06-17 PROCEDURE — 99214 OFFICE O/P EST MOD 30 MIN: CPT | Mod: S$GLB,,, | Performed by: NURSE PRACTITIONER

## 2025-06-17 PROCEDURE — 1101F PT FALLS ASSESS-DOCD LE1/YR: CPT | Mod: CPTII,S$GLB,, | Performed by: NURSE PRACTITIONER

## 2025-06-17 PROCEDURE — 1126F AMNT PAIN NOTED NONE PRSNT: CPT | Mod: CPTII,S$GLB,, | Performed by: NURSE PRACTITIONER

## 2025-06-17 PROCEDURE — 3078F DIAST BP <80 MM HG: CPT | Mod: CPTII,S$GLB,, | Performed by: NURSE PRACTITIONER

## 2025-06-17 PROCEDURE — 1160F RVW MEDS BY RX/DR IN RCRD: CPT | Mod: CPTII,S$GLB,, | Performed by: NURSE PRACTITIONER

## 2025-06-17 PROCEDURE — 3008F BODY MASS INDEX DOCD: CPT | Mod: CPTII,S$GLB,, | Performed by: NURSE PRACTITIONER

## 2025-06-17 RX ORDER — TIRZEPATIDE 2.5 MG/.5ML
2.5 INJECTION, SOLUTION SUBCUTANEOUS
Qty: 2 ML | Refills: 0 | Status: SHIPPED | OUTPATIENT
Start: 2025-06-17

## 2025-06-19 ENCOUNTER — TELEPHONE (OUTPATIENT)
Dept: FAMILY MEDICINE | Facility: CLINIC | Age: 75
End: 2025-06-19
Payer: MEDICARE

## 2025-06-19 DIAGNOSIS — R93.89 ABNORMAL THYROID ULTRASOUND: Primary | ICD-10-CM

## 2025-06-19 NOTE — TELEPHONE ENCOUNTER
----- Message from Kimberly sent at 6/19/2025  1:55 PM CDT -----  Returning a phone call  [phone]

## 2025-06-19 NOTE — TELEPHONE ENCOUNTER
Great. Set up remind me and let patient know   POST SEPSIS ALERT HUDDLE  Time Huddle Called Overhead: 10:53    Responding Provider: Casper Lima MD    ED RN: Luis Felipe Santana RN    SEVERE SEPSIS CRITERIA  Infection Source: UTI    SIRS: HR, RR, WBC    Organ Dysfunction: LA    Time Huddle Completed: 11:05    Were 2 sets of blood cultures drawn prior to ABX: yes     Time of Blood Culture # 1: 09:39  Time of Blood Culture # 2: 10:37    IV Antibiotic Given: rocephin   Time IV Antibiotic Given: 10:38    Initial Lactate: 4  Draw/Result Time: 10:42    Repeat Lactate: 2.5   Draw/Result Time: 13:03/ 13:38      INITIAL HYPOTENSION (second SBP <90 or MAP <65 within 3 hours of initial low value) or SHOCK CRITERIA (initial lactic acid > or = 4)  Actual Weight:48.3 kg    BMI: 20.12    IBW (only to be used if BMI >30 and documented by physician): n/a    IBW in kg charted in physician note: n/a    30 mL/k ml    Volume of Fluid Completed: 2449    Time Fluids Completed: 12:52    Documentation by Physician of fluid exclusion criteria used: n/a    Documentation by Physician of amount of fluids given with fluid exclusion criteria (MUST match orders in the MAR): n/a    Last 2 Consecutive BP/MAP within the hour after fluids completed:    1.  133/98  107       2.   137/103  112        Vasopressor Start Time (must be started within 6 hours of shock presentation time): n/a    Why Vasopressor not started: n/a    Time of Sepsis Reassessment Completed by Physician (must be within 6 hours of shock):

## 2025-06-19 NOTE — TELEPHONE ENCOUNTER
"----- Message from Med Assistant Andra sent at 12/19/2024  4:56 PM CST -----  Per Karolina "right benign nodules. Left solid nodule. Will repeat in 6 months to reassess size."     Thyroid ultrasound  "

## 2025-06-25 ENCOUNTER — OFFICE VISIT (OUTPATIENT)
Dept: FAMILY MEDICINE | Facility: CLINIC | Age: 75
End: 2025-06-25
Payer: MEDICARE

## 2025-06-25 ENCOUNTER — TELEPHONE (OUTPATIENT)
Dept: FAMILY MEDICINE | Facility: CLINIC | Age: 75
End: 2025-06-25

## 2025-06-25 VITALS
BODY MASS INDEX: 24.1 KG/M2 | HEART RATE: 75 BPM | SYSTOLIC BLOOD PRESSURE: 110 MMHG | DIASTOLIC BLOOD PRESSURE: 52 MMHG | WEIGHT: 136 LBS | HEIGHT: 63 IN | OXYGEN SATURATION: 98 %

## 2025-06-25 DIAGNOSIS — L21.9 SEBORRHEA: ICD-10-CM

## 2025-06-25 DIAGNOSIS — E11.3293 TYPE 2 DIABETES MELLITUS WITH BOTH EYES AFFECTED BY MILD NONPROLIFERATIVE RETINOPATHY WITHOUT MACULAR EDEMA, WITHOUT LONG-TERM CURRENT USE OF INSULIN: ICD-10-CM

## 2025-06-25 DIAGNOSIS — I10 ESSENTIAL HYPERTENSION: ICD-10-CM

## 2025-06-25 DIAGNOSIS — B00.1 RECURRENT COLD SORES: Primary | ICD-10-CM

## 2025-06-25 PROCEDURE — 3060F POS MICROALBUMINURIA REV: CPT | Mod: CPTII,S$GLB,, | Performed by: NURSE PRACTITIONER

## 2025-06-25 PROCEDURE — 3078F DIAST BP <80 MM HG: CPT | Mod: CPTII,S$GLB,, | Performed by: NURSE PRACTITIONER

## 2025-06-25 PROCEDURE — 3074F SYST BP LT 130 MM HG: CPT | Mod: CPTII,S$GLB,, | Performed by: NURSE PRACTITIONER

## 2025-06-25 PROCEDURE — 4010F ACE/ARB THERAPY RXD/TAKEN: CPT | Mod: CPTII,S$GLB,, | Performed by: NURSE PRACTITIONER

## 2025-06-25 PROCEDURE — 1101F PT FALLS ASSESS-DOCD LE1/YR: CPT | Mod: CPTII,S$GLB,, | Performed by: NURSE PRACTITIONER

## 2025-06-25 PROCEDURE — 1159F MED LIST DOCD IN RCRD: CPT | Mod: CPTII,S$GLB,, | Performed by: NURSE PRACTITIONER

## 2025-06-25 PROCEDURE — 3044F HG A1C LEVEL LT 7.0%: CPT | Mod: CPTII,S$GLB,, | Performed by: NURSE PRACTITIONER

## 2025-06-25 PROCEDURE — 1126F AMNT PAIN NOTED NONE PRSNT: CPT | Mod: CPTII,S$GLB,, | Performed by: NURSE PRACTITIONER

## 2025-06-25 PROCEDURE — 99213 OFFICE O/P EST LOW 20 MIN: CPT | Mod: S$GLB,,, | Performed by: NURSE PRACTITIONER

## 2025-06-25 PROCEDURE — 3008F BODY MASS INDEX DOCD: CPT | Mod: CPTII,S$GLB,, | Performed by: NURSE PRACTITIONER

## 2025-06-25 PROCEDURE — 1160F RVW MEDS BY RX/DR IN RCRD: CPT | Mod: CPTII,S$GLB,, | Performed by: NURSE PRACTITIONER

## 2025-06-25 PROCEDURE — 3288F FALL RISK ASSESSMENT DOCD: CPT | Mod: CPTII,S$GLB,, | Performed by: NURSE PRACTITIONER

## 2025-06-25 PROCEDURE — 3066F NEPHROPATHY DOC TX: CPT | Mod: CPTII,S$GLB,, | Performed by: NURSE PRACTITIONER

## 2025-06-25 RX ORDER — CLOBETASOL PROPIONATE 0.05 G/100ML
SHAMPOO TOPICAL
Qty: 118 ML | Refills: 0 | Status: SHIPPED | OUTPATIENT
Start: 2025-06-26

## 2025-06-25 RX ORDER — VALACYCLOVIR HYDROCHLORIDE 1 G/1
2000 TABLET, FILM COATED ORAL EVERY 12 HOURS
Qty: 24 TABLET | Refills: 0 | Status: SHIPPED | OUTPATIENT
Start: 2025-06-25 | End: 2026-06-25

## 2025-06-25 NOTE — TELEPHONE ENCOUNTER
----- Message from Azul sent at 6/25/2025  2:20 PM CDT -----  Pt was in this morning and was supposed to get 2 rx's called in. Nothing is at the pharmacy   [facility]   [phone]

## 2025-06-26 ENCOUNTER — HOSPITAL ENCOUNTER (OUTPATIENT)
Dept: RADIOLOGY | Facility: HOSPITAL | Age: 75
Discharge: HOME | End: 2025-06-26
Attending: NURSE PRACTITIONER
Payer: MEDICARE

## 2025-06-26 DIAGNOSIS — R93.89 ABNORMAL THYROID ULTRASOUND: ICD-10-CM

## 2025-06-26 PROCEDURE — 76536 US EXAM OF HEAD AND NECK: CPT | Mod: TC,PO

## 2025-06-26 PROCEDURE — 76536 US EXAM OF HEAD AND NECK: CPT | Mod: 26,,, | Performed by: RADIOLOGY

## 2025-06-29 ENCOUNTER — RESULTS FOLLOW-UP (OUTPATIENT)
Dept: FAMILY MEDICINE | Facility: CLINIC | Age: 75
End: 2025-06-29
Payer: MEDICARE

## 2025-06-29 NOTE — PROGRESS NOTES
SUBJECTIVE:    Patient ID: Kay Valencia is a 74 y.o. female.    Chief Complaint: Follow-up (No bottles//Pt is here for a week follow up//Eye exam scheduled in August with Dr Broussard//Foot exam ordered//KE)      History of Present Illness    CHIEF COMPLAINT:  74 year old female presents today for diabetes follow up.    DIABETES:  A1C has improved from 7.2 to 6.1. She continues Mounjaro injections and reports decreased appetite since starting the medication. She initially experienced some discomfort when beginning treatment. She reports feeling tired.    FAMILY HISTORY:  Sister has history of liver problems with elevated ammonia levels.      ROS:  General: -fever, -chills, +fatigue, -weight gain, -weight loss  Eyes: -vision changes, -redness, -discharge  ENT: -ear pain, -nasal congestion, -sore throat  Cardiovascular: -chest pain, -palpitations, -lower extremity edema  Respiratory: -cough, -shortness of breath  Gastrointestinal: -abdominal pain, -nausea, -vomiting, -diarrhea, -constipation, -blood in stool, +appetite changes, +loss of appetite  Genitourinary: -dysuria, -hematuria, -frequency  Musculoskeletal: -joint pain, -muscle pain  Skin: -rash, -lesion  Neurological: -headache, -dizziness, -numbness, -tingling  Psychiatric: -anxiety, -depression, -sleep difficulty              Office Visit on 06/17/2025   Component Date Value Ref Range Status    Hemoglobin A1C, POC 06/17/2025 6.1  % Final   Office Visit on 05/19/2025   Component Date Value Ref Range Status    Hemoglobin A1C, POC 05/19/2025 7.2  % Final   Office Visit on 05/05/2025   Component Date Value Ref Range Status    CULTURE, AEROBIC AND ANAEROBIC 05/05/2025 SEE COMMENT   Final    CULTURE, AEROBIC AND ANAEROBIC 05/05/2025 SEE COMMENT (A)   Final   Orders Only on 02/18/2025   Component Date Value Ref Range Status    Iron 02/18/2025 83  45 - 160 mcg/dL Final    TIBC 02/18/2025 313  250 - 450 mcg/dL (calc) Final    Iron Saturation 02/18/2025 27  16 - 45  % (calc) Final    Ferritin 02/18/2025 102  16 - 288 ng/mL Final    Folate 02/18/2025 15.0  ng/mL Final   Telephone on 01/30/2025   Component Date Value Ref Range Status    WBC 02/11/2025 3.9  3.8 - 10.8 Thousand/uL Final    RBC 02/11/2025 3.61 (L)  3.80 - 5.10 Million/uL Final    Hemoglobin 02/11/2025 10.9 (L)  11.7 - 15.5 g/dL Final    Hematocrit 02/11/2025 34.4 (L)  35.0 - 45.0 % Final    MCV 02/11/2025 95.3  80.0 - 100.0 fL Final    MCH 02/11/2025 30.2  27.0 - 33.0 pg Final    MCHC 02/11/2025 31.7 (L)  32.0 - 36.0 g/dL Final    RDW 02/11/2025 13.1  11.0 - 15.0 % Final    Platelets 02/11/2025 140  140 - 400 Thousand/uL Final    MPV 02/11/2025 11.0  7.5 - 12.5 fL Final    Neutrophils, Abs 02/11/2025 2,184  1,500 - 7,800 cells/uL Final    Lymph # 02/11/2025 1,034  850 - 3,900 cells/uL Final    Mono # 02/11/2025 363  200 - 950 cells/uL Final    Eos # 02/11/2025 281  15 - 500 cells/uL Final    Baso # 02/11/2025 39  0 - 200 cells/uL Final    Neutrophils Relative 02/11/2025 56  % Final    Lymph % 02/11/2025 26.5  % Final    Mono % 02/11/2025 9.3  % Final    Eosinophil % 02/11/2025 7.2  % Final    Basophil % 02/11/2025 1.0  % Final    Glucose 02/11/2025 128 (H)  65 - 99 mg/dL Final    BUN 02/11/2025 27 (H)  7 - 25 mg/dL Final    Creatinine 02/11/2025 1.07 (H)  0.60 - 1.00 mg/dL Final    eGFR 02/11/2025 55 (L)  > OR = 60 mL/min/1.73m2 Final    BUN/Creatinine Ratio 02/11/2025 25 (H)  6 - 22 (calc) Final    Sodium 02/11/2025 138  135 - 146 mmol/L Final    Potassium 02/11/2025 4.1  3.5 - 5.3 mmol/L Final    Chloride 02/11/2025 98  98 - 110 mmol/L Final    CO2 02/11/2025 28  20 - 32 mmol/L Final    Calcium 02/11/2025 10.0  8.6 - 10.4 mg/dL Final    Total Protein 02/11/2025 6.9  6.1 - 8.1 g/dL Final    Albumin 02/11/2025 4.6  3.6 - 5.1 g/dL Final    Globulin, Total 02/11/2025 2.3  1.9 - 3.7 g/dL (calc) Final    Albumin/Globulin Ratio 02/11/2025 2.0  1.0 - 2.5 (calc) Final    Total Bilirubin 02/11/2025 0.6  0.2 - 1.2 mg/dL  Final    Alkaline Phosphatase 02/11/2025 81  37 - 153 U/L Final    AST 02/11/2025 22  10 - 35 U/L Final    ALT 02/11/2025 21  6 - 29 U/L Final    Hemoglobin A1C 02/11/2025 6.6 (H)  <5.7 % of total Hgb Final    Cholesterol 02/11/2025 202 (H)  <200 mg/dL Final    HDL 02/11/2025 43 (L)  > OR = 50 mg/dL Final    Triglycerides 02/11/2025 351 (H)  <150 mg/dL Final    LDL Cholesterol 02/11/2025 112 (H)  mg/dL (calc) Final    HDL/Cholesterol Ratio 02/11/2025 4.7  <5.0 (calc) Final    Non HDL Chol. (LDL+VLDL) 02/11/2025 159 (H)  <130 mg/dL (calc) Final    Creatinine, Urine 02/18/2025 40  20 - 275 mg/dL Final    Microalb, Ur 02/18/2025 2.9  See Note: mg/dL Final    Microalb/Creat Ratio 02/18/2025 73 (H)  <30 mg/g creat Final    TSH w/reflex to FT4 02/11/2025 0.55  0.40 - 4.50 mIU/L Final    Color, UA 02/18/2025 YELLOW  YELLOW Final    Appearance, UA 02/18/2025 CLEAR  CLEAR Final    Specific Gravity, UA 02/18/2025 1.012  1.001 - 1.035 Final    pH, UA 02/18/2025 8.0  5.0 - 8.0 Final    Glucose, UA 02/18/2025 NEGATIVE  NEGATIVE Final    Bilirubin, UA 02/18/2025 NEGATIVE  NEGATIVE Final    Ketones, UA 02/18/2025 NEGATIVE  NEGATIVE Final    Occult Blood UA 02/18/2025 NEGATIVE  NEGATIVE Final    Protein, UA 02/18/2025 NEGATIVE  NEGATIVE Final    Nitrite, UA 02/18/2025 NEGATIVE  NEGATIVE Final    Leukocytes, UA 02/18/2025 NEGATIVE  NEGATIVE Final    WBC Casts, UA 02/18/2025 NONE SEEN  < OR = 5 /HPF Final    RBC Casts, UA 02/18/2025 NONE SEEN  < OR = 2 /HPF Final    Squam Epithel, UA 02/18/2025 NONE SEEN  < OR = 5 /HPF Final    Bacteria, UA 02/18/2025 NONE SEEN  NONE SEEN /HPF Final    Hyaline Casts, UA 02/18/2025 NONE SEEN  NONE SEEN /LPF Final    Service Cmt: 02/18/2025 SEE COMMENT   Final    Reflexive Urine Culture 02/18/2025 SEE COMMENT   Final       Past Medical History:   Diagnosis Date    Anxiety     Depression     Diabetes mellitus, type 2     GERD (gastroesophageal reflux disease)     Hyperlipidemia     Hypertension     Type  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      The 10-year CVD risk score (Amanda, et al., 2008) is: 20.7%    Values used to calculate the score:      Age: 74 years      Sex: Female      Diabetic: Yes      Tobacco smoker: No      Systolic Blood Pressure: 110 mmHg      Is BP treated: Yes      HDL Cholesterol: 43 mg/dL      Total Cholesterol: 202 mg/dL     Marital Status:   Alcohol History:  reports no history of alcohol use.  Tobacco History:  reports that she has quit smoking. She has never used smokeless tobacco.  Drug History:  reports no history of drug use.    Health Maintenance Topics with due status: Not Due       Topic Last Completion Date    Colorectal Cancer Screening 09/21/2017    DEXA Scan 05/20/2024    Mammogram 12/04/2024    Lipid Panel 02/11/2025    Diabetes Urine Screening 02/18/2025    Foot Exam 06/17/2025    Hemoglobin A1c 06/17/2025    Low Dose Statin 06/25/2025     Immunization History   Administered Date(s) Administered    COVID-19, MRNA, LN-S, PF (Pfizer) (Purple Cap) 03/08/2021, 03/29/2021    Influenza (FLUAD) - Quadrivalent - Adjuvanted - PF *Preferred* (65+) 10/04/2023    Influenza - Quadrivalent - High Dose - PF (65 years and older) 12/10/2020, 10/20/2021, 11/14/2022    Influenza - Trivalent - Fluad - Adjuvanted - PF (65 years and older 11/14/2024    Pneumococcal Conjugate - 13 Valent 05/10/2018    Pneumococcal Polysaccharide - 23 Valent 06/10/2020       Review of patient's allergies  "indicates:   Allergen Reactions    Flexeril [cyclobenzaprine] Other (See Comments)     Unknown was a long time ago.    Keflex [cephalexin] Diarrhea    Sulfa (sulfonamide antibiotics) Nausea Only    Augmentin [amoxicillin-pot clavulanate] Other (See Comments)     Gas     Current Medications[1]        Objective:      Vitals:    06/17/25 1125   BP: 118/60   Pulse: 70   SpO2: 97%   Weight: 62.1 kg (136 lb 12.8 oz)   Height: 5' 2.5" (1.588 m)       Physical Exam  Vitals and nursing note reviewed.   Constitutional:       General: She is not in acute distress.     Appearance: Normal appearance. She is well-developed and normal weight.   HENT:      Head: Normocephalic and atraumatic.      Right Ear: External ear normal.      Left Ear: External ear normal.      Nose: No congestion.   Cardiovascular:      Rate and Rhythm: Normal rate and regular rhythm.      Pulses:           Dorsalis pedis pulses are 2+ on the right side.        Posterior tibial pulses are 2+ on the right side.      Heart sounds: Normal heart sounds.   Pulmonary:      Effort: Pulmonary effort is normal.      Breath sounds: Normal breath sounds.   Abdominal:      General: Bowel sounds are normal.      Palpations: Abdomen is soft.   Musculoskeletal:         General: No deformity. Normal range of motion.      Cervical back: Normal range of motion and neck supple.      Right foot: Normal range of motion. No deformity.   Feet:      Right foot:      Protective Sensation: 5 sites tested.  5 sites sensed.      Left foot:      Protective Sensation: 5 sites tested.  5 sites sensed.      Skin integrity: No skin breakdown.   Lymphadenopathy:      Cervical: No cervical adenopathy.   Skin:     General: Skin is warm and dry.   Neurological:      Mental Status: She is alert and oriented to person, place, and time.   Psychiatric:         Behavior: Behavior normal.          Assessment:       1. 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 Mixed hyperlipidemia    3. Gastroesophageal reflux disease, unspecified whether esophagitis present    4. Anxiety    5. Essential hypertension    6. Generalized OA           Assessment & Plan    - A1C improved from 7.2 to 6.1, indicating better glycemic control.  - BMI returned to normal range (<25).  - Changed Mounjaro (tirzepatide) injection frequency to once every 14 days due to improved glycemic control and weight loss.  - Evaluated insurance coverage and cost options for Mounjaro (tirzepatide) prescription.  - Decreased calcium supplement frequency from daily to 3 times per week.    TYPE 2 DIABETES MELLITUS:  - Kay's diabetes control has improved significantly, with HbA1c decreasing from 7.2 to 6.1 and average blood sugar dropping from 156 to 123.  - Will continue Mounjaro injections once every 2 weeks to maintain this progress.  - Discussed safe needle disposal methods, including recapping used needles and placing them in secure containers such as empty medicine bottles or laundry detergent bottles.    FATIGUE:  - Kay reports feeling tired.    FAMILY HISTORY OF DIGESTIVE SYSTEM DISEASE:  - Kay's sister has liver issues        Plan:       1. 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 significant improvement  Orders:  -     POCT HEMOGLOBIN A1C  -     HM DIABETES FOOT EXAM  -     tirzepatide (MOUNJARO) 2.5 mg/0.5 mL PnIj; Inject 2.5 mg into the skin every 7 days.  Dispense: 2 mL; Refill: 0    2. Mixed hyperlipidemia    3. Gastroesophageal reflux disease, unspecified whether esophagitis present  Comments:  protonix    4. Anxiety  Comments:  xanax prn    5. Essential hypertension  Comments:  bp is well controlled    6. Generalized OA  Comments:  mobic      Follow up in about 3 months (around  9/17/2025), or if symptoms worsen or fail to improve, for medication management, Diabetic Check-Up.          Counseled on age and gender appropriate medical preventative services, including cancer screenings, immunizations, overall nutritional health, need for a consistent exercise regimen and an overall push towards maintaining a vigorous and active lifestyle.             6/29/2025 Karolina Ramirez NP         [1]   Current Outpatient Medications:     ALPRAZolam (XANAX) 1 MG tablet, Take 1 tablet (1 mg total) by mouth every evening., Disp: 30 tablet, Rfl: 2    atorvastatin (LIPITOR) 80 MG tablet, Take 1 tablet (80 mg total) by mouth once daily., Disp: 90 tablet, Rfl: 3    blood-glucose meter kit, To check BG 1 times daily, to use with insurance preferred meter, Disp: 1 each, Rfl: 0    calcium carbonate/vitamin D3 (CALTRATE 600 PLUS D ORAL), Caltrate 600 plus D, Disp: , Rfl:     famotidine (PEPCID) 40 MG tablet, Take 1 tablet (40 mg total) by mouth once daily., Disp: 90 tablet, Rfl: 1    glipiZIDE (GLUCOTROL) 5 MG tablet, Take 1 tablet (5 mg total) by mouth daily with breakfast., Disp: 90 tablet, Rfl: 1    hydrALAZINE (APRESOLINE) 25 MG tablet, Take 1 tablet (25 mg total) by mouth 2 (two) times a day., Disp: 180 tablet, Rfl: 3    hydroCHLOROthiazide (HYDRODIURIL) 12.5 MG Tab, Take 2 tablets (25 mg total) by mouth once daily., Disp: 180 tablet, Rfl: 3    HYDROcodone-acetaminophen (NORCO)  mg per tablet, Take 1 tablet by mouth every 6 (six) hours as needed for Pain., Disp: , Rfl:     krill-om-3-dha-epa-phospho-ast (KRILL OIL) 1,081-234-86-80 mg Cap, Take 1 Capful by mouth 2 (two) times a day., Disp: 60 capsule, Rfl: 1    Lactobacillus rhamnosus GG (CULTURELLE) 10 billion cell capsule, Take 1 capsule by mouth once daily., Disp: , Rfl:     losartan (COZAAR) 100 MG tablet, Take 1 tablet (100 mg total) by mouth once daily., Disp: 90 tablet, Rfl: 0    meloxicam (MOBIC) 15 MG tablet, Take 1 tablet (15 mg total) by mouth  once daily., Disp: 90 tablet, Rfl: 1    metFORMIN (GLUCOPHAGE) 500 MG tablet, Take 1 tablet (500 mg total) by mouth 2 (two) times daily., Disp: 180 tablet, Rfl: 3    ondansetron (ZOFRAN-ODT) 4 MG TbDL, Take 1 tablet (4 mg total) by mouth every 8 (eight) hours as needed (nausea)., Disp: 20 tablet, Rfl: 1    pantoprazole (PROTONIX) 40 MG tablet, Take 1 tablet (40 mg total) by mouth once daily., Disp: 90 tablet, Rfl: 3    potassium chloride (MICRO-K) 10 MEQ CpSR, Take 1 capsule (10 mEq total) by mouth once daily., Disp: 90 capsule, Rfl: 3    tretinoin (RETIN-A) 0.05 % cream, Apply topically every evening., Disp: 20 g, Rfl: 3    triamcinolone acetonide 0.1% (KENALOG) 0.1 % cream, Apply topically 2 (two) times daily., Disp: 80 g, Rfl: 1    clobetasoL (CLOBEX) 0.05 % shampoo, Apply topically twice a week., Disp: 118 mL, Rfl: 0    tirzepatide (MOUNJARO) 2.5 mg/0.5 mL PnIj, Inject 2.5 mg into the skin every 7 days., Disp: 2 mL, Rfl: 0    valACYclovir (VALTREX) 1000 MG tablet, Take 2 tablets (2,000 mg total) by mouth every 12 (twelve) hours., Disp: 24 tablet, Rfl: 0

## 2025-06-30 DIAGNOSIS — E11.65 UNCONTROLLED TYPE 2 DIABETES MELLITUS WITH HYPERGLYCEMIA: ICD-10-CM

## 2025-06-30 RX ORDER — METFORMIN HYDROCHLORIDE 500 MG/1
500 TABLET ORAL 2 TIMES DAILY
Qty: 180 TABLET | Refills: 3 | Status: SHIPPED | OUTPATIENT
Start: 2025-06-30

## 2025-06-30 NOTE — TELEPHONE ENCOUNTER
----- Message from Azul sent at 6/30/2025  8:35 AM CDT -----  Pt needs refill on metformin   [facility] [place]   [phone]

## 2025-07-04 NOTE — PROGRESS NOTES
SUBJECTIVE:    Patient ID: Kay Valencia is a 74 y.o. female.    Chief Complaint: Rash (No bottles//Pt is here for bumps that keep popping up-had one on her chin at last visit and now it is on her lip, just wants to check and see what it could be-she used to get them on her scalp//KE)      History of Present Illness    CHIEF COMPLAINT:  74 year old female presents today with a skin lesion     FACIAL LESION:  She reports a facial lesion that initially presented as a pimple and developed into a loose scab. The lesion appears similar to her previous cold sores. She denies presence of pus under the lesion.    DERMATOLOGIC HISTORY:  She has a history of cold sores in the distant past. She also reports an ongoing eczema-like scalp condition, currently managed with medicated shampoo.      ROS:  General: -fever, -chills, -fatigue, -weight gain, -weight loss  Eyes: -vision changes, -redness, -discharge  ENT: -ear pain, -nasal congestion, -sore throat  Cardiovascular: -chest pain, -palpitations, -lower extremity edema  Respiratory: -cough, -shortness of breath  Gastrointestinal: -abdominal pain, -nausea, -vomiting, -diarrhea, -constipation, -blood in stool  Genitourinary: -dysuria, -hematuria, -frequency  Musculoskeletal: -joint pain, -muscle pain  Skin: -rash, -lesion, +sores  Neurological: -headache, -dizziness, -numbness, -tingling  Psychiatric: -anxiety, -depression, -sleep difficulty              Office Visit on 06/17/2025   Component Date Value Ref Range Status    Hemoglobin A1C, POC 06/17/2025 6.1  % Final   Office Visit on 05/19/2025   Component Date Value Ref Range Status    Hemoglobin A1C, POC 05/19/2025 7.2  % Final   Office Visit on 05/05/2025   Component Date Value Ref Range Status    CULTURE, AEROBIC AND ANAEROBIC 05/05/2025 SEE COMMENT   Final    CULTURE, AEROBIC AND ANAEROBIC 05/05/2025 SEE COMMENT (A)   Final   Orders Only on 02/18/2025   Component Date Value Ref Range Status    Iron 02/18/2025 83  45 -  160 mcg/dL Final    TIBC 02/18/2025 313  250 - 450 mcg/dL (calc) Final    Iron Saturation 02/18/2025 27  16 - 45 % (calc) Final    Ferritin 02/18/2025 102  16 - 288 ng/mL Final    Folate 02/18/2025 15.0  ng/mL Final   Telephone on 01/30/2025   Component Date Value Ref Range Status    WBC 02/11/2025 3.9  3.8 - 10.8 Thousand/uL Final    RBC 02/11/2025 3.61 (L)  3.80 - 5.10 Million/uL Final    Hemoglobin 02/11/2025 10.9 (L)  11.7 - 15.5 g/dL Final    Hematocrit 02/11/2025 34.4 (L)  35.0 - 45.0 % Final    MCV 02/11/2025 95.3  80.0 - 100.0 fL Final    MCH 02/11/2025 30.2  27.0 - 33.0 pg Final    MCHC 02/11/2025 31.7 (L)  32.0 - 36.0 g/dL Final    RDW 02/11/2025 13.1  11.0 - 15.0 % Final    Platelets 02/11/2025 140  140 - 400 Thousand/uL Final    MPV 02/11/2025 11.0  7.5 - 12.5 fL Final    Neutrophils, Abs 02/11/2025 2,184  1,500 - 7,800 cells/uL Final    Lymph # 02/11/2025 1,034  850 - 3,900 cells/uL Final    Mono # 02/11/2025 363  200 - 950 cells/uL Final    Eos # 02/11/2025 281  15 - 500 cells/uL Final    Baso # 02/11/2025 39  0 - 200 cells/uL Final    Neutrophils Relative 02/11/2025 56  % Final    Lymph % 02/11/2025 26.5  % Final    Mono % 02/11/2025 9.3  % Final    Eosinophil % 02/11/2025 7.2  % Final    Basophil % 02/11/2025 1.0  % Final    Glucose 02/11/2025 128 (H)  65 - 99 mg/dL Final    BUN 02/11/2025 27 (H)  7 - 25 mg/dL Final    Creatinine 02/11/2025 1.07 (H)  0.60 - 1.00 mg/dL Final    eGFR 02/11/2025 55 (L)  > OR = 60 mL/min/1.73m2 Final    BUN/Creatinine Ratio 02/11/2025 25 (H)  6 - 22 (calc) Final    Sodium 02/11/2025 138  135 - 146 mmol/L Final    Potassium 02/11/2025 4.1  3.5 - 5.3 mmol/L Final    Chloride 02/11/2025 98  98 - 110 mmol/L Final    CO2 02/11/2025 28  20 - 32 mmol/L Final    Calcium 02/11/2025 10.0  8.6 - 10.4 mg/dL Final    Total Protein 02/11/2025 6.9  6.1 - 8.1 g/dL Final    Albumin 02/11/2025 4.6  3.6 - 5.1 g/dL Final    Globulin, Total 02/11/2025 2.3  1.9 - 3.7 g/dL (calc) Final     Albumin/Globulin Ratio 02/11/2025 2.0  1.0 - 2.5 (calc) Final    Total Bilirubin 02/11/2025 0.6  0.2 - 1.2 mg/dL Final    Alkaline Phosphatase 02/11/2025 81  37 - 153 U/L Final    AST 02/11/2025 22  10 - 35 U/L Final    ALT 02/11/2025 21  6 - 29 U/L Final    Hemoglobin A1C 02/11/2025 6.6 (H)  <5.7 % of total Hgb Final    Cholesterol 02/11/2025 202 (H)  <200 mg/dL Final    HDL 02/11/2025 43 (L)  > OR = 50 mg/dL Final    Triglycerides 02/11/2025 351 (H)  <150 mg/dL Final    LDL Cholesterol 02/11/2025 112 (H)  mg/dL (calc) Final    HDL/Cholesterol Ratio 02/11/2025 4.7  <5.0 (calc) Final    Non HDL Chol. (LDL+VLDL) 02/11/2025 159 (H)  <130 mg/dL (calc) Final    Creatinine, Urine 02/18/2025 40  20 - 275 mg/dL Final    Microalb, Ur 02/18/2025 2.9  See Note: mg/dL Final    Microalb/Creat Ratio 02/18/2025 73 (H)  <30 mg/g creat Final    TSH w/reflex to FT4 02/11/2025 0.55  0.40 - 4.50 mIU/L Final    Color, UA 02/18/2025 YELLOW  YELLOW Final    Appearance, UA 02/18/2025 CLEAR  CLEAR Final    Specific Gravity, UA 02/18/2025 1.012  1.001 - 1.035 Final    pH, UA 02/18/2025 8.0  5.0 - 8.0 Final    Glucose, UA 02/18/2025 NEGATIVE  NEGATIVE Final    Bilirubin, UA 02/18/2025 NEGATIVE  NEGATIVE Final    Ketones, UA 02/18/2025 NEGATIVE  NEGATIVE Final    Occult Blood UA 02/18/2025 NEGATIVE  NEGATIVE Final    Protein, UA 02/18/2025 NEGATIVE  NEGATIVE Final    Nitrite, UA 02/18/2025 NEGATIVE  NEGATIVE Final    Leukocytes, UA 02/18/2025 NEGATIVE  NEGATIVE Final    WBC Casts, UA 02/18/2025 NONE SEEN  < OR = 5 /HPF Final    RBC Casts, UA 02/18/2025 NONE SEEN  < OR = 2 /HPF Final    Squam Epithel, UA 02/18/2025 NONE SEEN  < OR = 5 /HPF Final    Bacteria, UA 02/18/2025 NONE SEEN  NONE SEEN /HPF Final    Hyaline Casts, UA 02/18/2025 NONE SEEN  NONE SEEN /LPF Final    Service Cmt: 02/18/2025 SEE COMMENT   Final    Reflexive Urine Culture 02/18/2025 SEE COMMENT   Final       Past Medical History:   Diagnosis Date    Anxiety     Depression      Diabetes mellitus, type 2     GERD (gastroesophageal reflux disease)     Hyperlipidemia     Hypertension     Type 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      The 10-year CVD risk score (Amanda, et al., 2008) is: 20.7%    Values used to calculate the score:      Age: 74 years      Sex: Female      Diabetic: Yes      Tobacco smoker: No      Systolic Blood Pressure: 110 mmHg      Is BP treated: Yes      HDL Cholesterol: 43 mg/dL      Total Cholesterol: 202 mg/dL     Marital Status:   Alcohol History:  reports no history of alcohol use.  Tobacco History:  reports that she has quit smoking. She has never used smokeless tobacco.  Drug History:  reports no history of drug use.    Health Maintenance Topics with due status: Not Due       Topic Last Completion Date    Colorectal Cancer Screening 09/21/2017    DEXA Scan 05/20/2024    Influenza Vaccine 11/14/2024    Mammogram 12/04/2024    Lipid Panel 02/11/2025    Diabetes Urine Screening 02/18/2025    Foot Exam 06/17/2025    Hemoglobin A1c 06/17/2025    Low Dose Statin 06/29/2025     Immunization History   Administered Date(s) Administered    COVID-19, MRNA, LN-S, PF (Pfizer) (Purple Cap) 03/08/2021, 03/29/2021    Influenza (FLUAD) - Quadrivalent - Adjuvanted - PF *Preferred* (65+) 10/04/2023    Influenza - Quadrivalent - High Dose - PF (65 years and older) 12/10/2020, 10/20/2021, 11/14/2022    Influenza - Trivalent - Fluad - Adjuvanted - PF (65 years and older  "11/14/2024    Pneumococcal Conjugate - 13 Valent 05/10/2018    Pneumococcal Polysaccharide - 23 Valent 06/10/2020       Review of patient's allergies indicates:   Allergen Reactions    Flexeril [cyclobenzaprine] Other (See Comments)     Unknown was a long time ago.    Keflex [cephalexin] Diarrhea    Sulfa (sulfonamide antibiotics) Nausea Only    Augmentin [amoxicillin-pot clavulanate] Other (See Comments)     Gas     Current Medications[1]        Objective:      Vitals:    06/25/25 0921   BP: (!) 110/52   Pulse: 75   SpO2: 98%   Weight: 61.7 kg (136 lb)   Height: 5' 2.5" (1.588 m)       Physical Exam  Vitals and nursing note reviewed.   Constitutional:       Appearance: Normal appearance. She is well-developed. She is not toxic-appearing.   HENT:      Head: Normocephalic.      Mouth/Throat:        Comments: Vesicular lesion to left lower lip  Neck:      Vascular: No JVD.   Cardiovascular:      Rate and Rhythm: Normal rate and regular rhythm.      Heart sounds: No murmur heard.  Pulmonary:      Effort: Pulmonary effort is normal.      Breath sounds: Normal breath sounds.   Musculoskeletal:      Cervical back: Normal range of motion and neck supple.   Lymphadenopathy:      Cervical: No cervical adenopathy.   Skin:     General: Skin is warm and dry.      Findings: No rash.   Neurological:      Mental Status: She is alert and oriented to person, place, and time.      Gait: Gait normal.   Psychiatric:         Speech: Speech normal.         Behavior: Behavior normal.          Assessment:       1. Recurrent cold sores    2. Seborrhea    3. Type 2 diabetes mellitus with both eyes affected by mild nonproliferative retinopathy without macular edema, without long-term current use of insulin    4. Essential hypertension           Assessment & Plan    - Assessed lesion on left side of face, likely a cold sore.  - Noted eczema-like condition on scalp, current treatment partially effective.    HERPESVIRAL VESICULAR DERMATITIS " (COLD SORE):  - Identified facial lesion as a cold sore.  - Prescribed Valtrex with instructions to take 2 tablets immediately upon noticing the cold sore and 2 more tablets 12 hours later.  - For the current outbreak, patient should take 2 tablets immediately and 2 more before bed.  - Provided a total of 24 pills for future occurrences.    OTHER CONDITIONS:  - Plan to prescribe medicated shampoo for scalp condition.        Plan:       1. Recurrent cold sores  Comments:  valtrex  Orders:  -     valACYclovir (VALTREX) 1000 MG tablet; Take 2 tablets (2,000 mg total) by mouth every 12 (twelve) hours.  Dispense: 24 tablet; Refill: 0    2. Seborrhea  Comments:  clobex  Orders:  -     clobetasoL (CLOBEX) 0.05 % shampoo; Apply topically twice a week.  Dispense: 118 mL; Refill: 0    3. Type 2 diabetes mellitus with both eyes affected by mild nonproliferative retinopathy without macular edema, without long-term current use of insulin  Comments:  controlled    4. Essential hypertension  Comments:  controlled      Follow up in about 3 months (around 9/25/2025), or if symptoms worsen or fail to improve, for medication management, Diabetic Check-Up.          Counseled on age and gender appropriate medical preventative services, including cancer screenings, immunizations, overall nutritional health, need for a consistent exercise regimen and an overall push towards maintaining a vigorous and active lifestyle.             7/4/2025 Karolina Ramirez NP         [1]   Current Outpatient Medications:     ALPRAZolam (XANAX) 1 MG tablet, Take 1 tablet (1 mg total) by mouth every evening., Disp: 30 tablet, Rfl: 2    atorvastatin (LIPITOR) 80 MG tablet, Take 1 tablet (80 mg total) by mouth once daily., Disp: 90 tablet, Rfl: 3    blood-glucose meter kit, To check BG 1 times daily, to use with insurance preferred meter, Disp: 1 each, Rfl: 0    calcium carbonate/vitamin D3 (CALTRATE 600 PLUS D ORAL), Caltrate 600 plus D, Disp: , Rfl:      famotidine (PEPCID) 40 MG tablet, Take 1 tablet (40 mg total) by mouth once daily., Disp: 90 tablet, Rfl: 1    glipiZIDE (GLUCOTROL) 5 MG tablet, Take 1 tablet (5 mg total) by mouth daily with breakfast., Disp: 90 tablet, Rfl: 1    hydrALAZINE (APRESOLINE) 25 MG tablet, Take 1 tablet (25 mg total) by mouth 2 (two) times a day., Disp: 180 tablet, Rfl: 3    hydroCHLOROthiazide (HYDRODIURIL) 12.5 MG Tab, Take 2 tablets (25 mg total) by mouth once daily., Disp: 180 tablet, Rfl: 3    HYDROcodone-acetaminophen (NORCO)  mg per tablet, Take 1 tablet by mouth every 6 (six) hours as needed for Pain., Disp: , Rfl:     krill-om-3-dha-epa-phospho-ast (KRILL OIL) 1,390-312-29-80 mg Cap, Take 1 Capful by mouth 2 (two) times a day., Disp: 60 capsule, Rfl: 1    Lactobacillus rhamnosus GG (CULTURELLE) 10 billion cell capsule, Take 1 capsule by mouth once daily., Disp: , Rfl:     losartan (COZAAR) 100 MG tablet, Take 1 tablet (100 mg total) by mouth once daily., Disp: 90 tablet, Rfl: 0    meloxicam (MOBIC) 15 MG tablet, Take 1 tablet (15 mg total) by mouth once daily., Disp: 90 tablet, Rfl: 1    ondansetron (ZOFRAN-ODT) 4 MG TbDL, Take 1 tablet (4 mg total) by mouth every 8 (eight) hours as needed (nausea)., Disp: 20 tablet, Rfl: 1    pantoprazole (PROTONIX) 40 MG tablet, Take 1 tablet (40 mg total) by mouth once daily., Disp: 90 tablet, Rfl: 3    potassium chloride (MICRO-K) 10 MEQ CpSR, Take 1 capsule (10 mEq total) by mouth once daily., Disp: 90 capsule, Rfl: 3    tirzepatide (MOUNJARO) 2.5 mg/0.5 mL PnIj, Inject 2.5 mg into the skin every 7 days., Disp: 2 mL, Rfl: 0    tretinoin (RETIN-A) 0.05 % cream, Apply topically every evening., Disp: 20 g, Rfl: 3    triamcinolone acetonide 0.1% (KENALOG) 0.1 % cream, Apply topically 2 (two) times daily., Disp: 80 g, Rfl: 1    clobetasoL (CLOBEX) 0.05 % shampoo, Apply topically twice a week., Disp: 118 mL, Rfl: 0    metFORMIN (GLUCOPHAGE) 500 MG tablet, Take 1 tablet (500 mg total) by  mouth 2 (two) times daily., Disp: 180 tablet, Rfl: 3    valACYclovir (VALTREX) 1000 MG tablet, Take 2 tablets (2,000 mg total) by mouth every 12 (twelve) hours., Disp: 24 tablet, Rfl: 0

## 2025-07-09 ENCOUNTER — TELEPHONE (OUTPATIENT)
Dept: FAMILY MEDICINE | Facility: CLINIC | Age: 75
End: 2025-07-09

## 2025-07-09 NOTE — TELEPHONE ENCOUNTER
----- Message from Niurka sent at 7/9/2025  1:37 PM CDT -----  Regarding: PT IS HERE  Karolina Pt is here asking to be seen for possible UTI can someone speak to her thanks [phone]

## 2025-07-10 ENCOUNTER — OFFICE VISIT (OUTPATIENT)
Dept: FAMILY MEDICINE | Facility: CLINIC | Age: 75
End: 2025-07-10
Payer: MEDICARE

## 2025-07-10 VITALS
WEIGHT: 137 LBS | SYSTOLIC BLOOD PRESSURE: 134 MMHG | HEART RATE: 94 BPM | OXYGEN SATURATION: 98 % | BODY MASS INDEX: 24.27 KG/M2 | DIASTOLIC BLOOD PRESSURE: 70 MMHG | HEIGHT: 63 IN

## 2025-07-10 DIAGNOSIS — R39.9 UTI SYMPTOMS: Primary | ICD-10-CM

## 2025-07-10 DIAGNOSIS — E11.9 TYPE 2 DIABETES MELLITUS WITHOUT COMPLICATION, WITHOUT LONG-TERM CURRENT USE OF INSULIN: ICD-10-CM

## 2025-07-10 PROCEDURE — 3060F POS MICROALBUMINURIA REV: CPT | Mod: CPTII,S$GLB,, | Performed by: NURSE PRACTITIONER

## 2025-07-10 PROCEDURE — 1159F MED LIST DOCD IN RCRD: CPT | Mod: CPTII,S$GLB,, | Performed by: NURSE PRACTITIONER

## 2025-07-10 PROCEDURE — 1101F PT FALLS ASSESS-DOCD LE1/YR: CPT | Mod: CPTII,S$GLB,, | Performed by: NURSE PRACTITIONER

## 2025-07-10 PROCEDURE — 3066F NEPHROPATHY DOC TX: CPT | Mod: CPTII,S$GLB,, | Performed by: NURSE PRACTITIONER

## 2025-07-10 PROCEDURE — 3078F DIAST BP <80 MM HG: CPT | Mod: CPTII,S$GLB,, | Performed by: NURSE PRACTITIONER

## 2025-07-10 PROCEDURE — 4010F ACE/ARB THERAPY RXD/TAKEN: CPT | Mod: CPTII,S$GLB,, | Performed by: NURSE PRACTITIONER

## 2025-07-10 PROCEDURE — 3075F SYST BP GE 130 - 139MM HG: CPT | Mod: CPTII,S$GLB,, | Performed by: NURSE PRACTITIONER

## 2025-07-10 PROCEDURE — 3288F FALL RISK ASSESSMENT DOCD: CPT | Mod: CPTII,S$GLB,, | Performed by: NURSE PRACTITIONER

## 2025-07-10 PROCEDURE — 3008F BODY MASS INDEX DOCD: CPT | Mod: CPTII,S$GLB,, | Performed by: NURSE PRACTITIONER

## 2025-07-10 PROCEDURE — 3044F HG A1C LEVEL LT 7.0%: CPT | Mod: CPTII,S$GLB,, | Performed by: NURSE PRACTITIONER

## 2025-07-10 PROCEDURE — 99213 OFFICE O/P EST LOW 20 MIN: CPT | Mod: S$GLB,,, | Performed by: NURSE PRACTITIONER

## 2025-07-10 PROCEDURE — 1125F AMNT PAIN NOTED PAIN PRSNT: CPT | Mod: CPTII,S$GLB,, | Performed by: NURSE PRACTITIONER

## 2025-07-10 RX ORDER — NITROFURANTOIN 25; 75 MG/1; MG/1
100 CAPSULE ORAL 2 TIMES DAILY
Qty: 10 CAPSULE | Refills: 0 | Status: SHIPPED | OUTPATIENT
Start: 2025-07-10

## 2025-07-10 NOTE — PROGRESS NOTES
"  SUBJECTIVE:    Patient ID: Kay Valencia is a 74 y.o. female.    Chief Complaint: Urinary Tract Infection (No bottles//Pt c/o uti symptoms, pelvic pressure, frequency and urgency to urinate x 7 days//Eye exam scheduled for 08/26/2025 with [facility]Y )      History of Present Illness    Patient presents today with symptoms concerning for urinary tract infection    She reports progressive urinary symptoms over the past week that began while out of town She describes pelvic pressure with a sensation that something is "falling out" with voiding, along with increased urinary frequency and urgency. She denies dysuria, hematuria, urinary odor, fever, and significant nausea. She feels cold with mild nausea.  No flank pain    She has a history of non-recurrent urinary tract infections with the last episode approximately one year ago. She has well-controlled diabetes with recent A1C of 6.1. She denies history of kidney stones.    She has allergies to Augmentin, Sulfa, and Keflex without history of anaphylaxis or severe allergic reactions.      ROS:  General: no fever, complains of chills, no fatigue, no weight gain, no weight loss  Eyes: no vision changes, no redness, no discharge  ENT: no ear pain, no nasal congestion, no sore throat  Cardiovascular: no chest pain, no palpitations, no lower extremity edema  Respiratory: no cough, no shortness of breath  Gastrointestinal: no abdominal pain, complains of nausea, no vomiting, no diarrhea, no constipation, no blood in stool  Genitourinary: no dysuria, no hematuria, complains of frequency/urgency, pelvic pressure  Skin: no rash, no lesion  Neurological: no headache, no dizziness, no numbness, no tingling             Office Visit on 06/17/2025   Component Date Value Ref Range Status    Hemoglobin A1C, POC 06/17/2025 6.1  % Final   Office Visit on 05/19/2025   Component Date Value Ref Range Status    Hemoglobin A1C, POC 05/19/2025 7.2  % Final   Office Visit on " 05/05/2025   Component Date Value Ref Range Status    CULTURE, AEROBIC AND ANAEROBIC 05/05/2025 SEE COMMENT   Final    CULTURE, AEROBIC AND ANAEROBIC 05/05/2025 SEE COMMENT (A)   Final   Orders Only on 02/18/2025   Component Date Value Ref Range Status    Iron 02/18/2025 83  45 - 160 mcg/dL Final    TIBC 02/18/2025 313  250 - 450 mcg/dL (calc) Final    Iron Saturation 02/18/2025 27  16 - 45 % (calc) Final    Ferritin 02/18/2025 102  16 - 288 ng/mL Final    Folate 02/18/2025 15.0  ng/mL Final   Telephone on 01/30/2025   Component Date Value Ref Range Status    WBC 02/11/2025 3.9  3.8 - 10.8 Thousand/uL Final    RBC 02/11/2025 3.61 (L)  3.80 - 5.10 Million/uL Final    Hemoglobin 02/11/2025 10.9 (L)  11.7 - 15.5 g/dL Final    Hematocrit 02/11/2025 34.4 (L)  35.0 - 45.0 % Final    MCV 02/11/2025 95.3  80.0 - 100.0 fL Final    MCH 02/11/2025 30.2  27.0 - 33.0 pg Final    MCHC 02/11/2025 31.7 (L)  32.0 - 36.0 g/dL Final    RDW 02/11/2025 13.1  11.0 - 15.0 % Final    Platelets 02/11/2025 140  140 - 400 Thousand/uL Final    MPV 02/11/2025 11.0  7.5 - 12.5 fL Final    Neutrophils, Abs 02/11/2025 2,184  1,500 - 7,800 cells/uL Final    Lymph # 02/11/2025 1,034  850 - 3,900 cells/uL Final    Mono # 02/11/2025 363  200 - 950 cells/uL Final    Eos # 02/11/2025 281  15 - 500 cells/uL Final    Baso # 02/11/2025 39  0 - 200 cells/uL Final    Neutrophils Relative 02/11/2025 56  % Final    Lymph % 02/11/2025 26.5  % Final    Mono % 02/11/2025 9.3  % Final    Eosinophil % 02/11/2025 7.2  % Final    Basophil % 02/11/2025 1.0  % Final    Glucose 02/11/2025 128 (H)  65 - 99 mg/dL Final    BUN 02/11/2025 27 (H)  7 - 25 mg/dL Final    Creatinine 02/11/2025 1.07 (H)  0.60 - 1.00 mg/dL Final    eGFR 02/11/2025 55 (L)  > OR = 60 mL/min/1.73m2 Final    BUN/Creatinine Ratio 02/11/2025 25 (H)  6 - 22 (calc) Final    Sodium 02/11/2025 138  135 - 146 mmol/L Final    Potassium 02/11/2025 4.1  3.5 - 5.3 mmol/L Final    Chloride 02/11/2025 98  98 -  110 mmol/L Final    CO2 02/11/2025 28  20 - 32 mmol/L Final    Calcium 02/11/2025 10.0  8.6 - 10.4 mg/dL Final    Total Protein 02/11/2025 6.9  6.1 - 8.1 g/dL Final    Albumin 02/11/2025 4.6  3.6 - 5.1 g/dL Final    Globulin, Total 02/11/2025 2.3  1.9 - 3.7 g/dL (calc) Final    Albumin/Globulin Ratio 02/11/2025 2.0  1.0 - 2.5 (calc) Final    Total Bilirubin 02/11/2025 0.6  0.2 - 1.2 mg/dL Final    Alkaline Phosphatase 02/11/2025 81  37 - 153 U/L Final    AST 02/11/2025 22  10 - 35 U/L Final    ALT 02/11/2025 21  6 - 29 U/L Final    Hemoglobin A1C 02/11/2025 6.6 (H)  <5.7 % of total Hgb Final    Cholesterol 02/11/2025 202 (H)  <200 mg/dL Final    HDL 02/11/2025 43 (L)  > OR = 50 mg/dL Final    Triglycerides 02/11/2025 351 (H)  <150 mg/dL Final    LDL Cholesterol 02/11/2025 112 (H)  mg/dL (calc) Final    HDL/Cholesterol Ratio 02/11/2025 4.7  <5.0 (calc) Final    Non HDL Chol. (LDL+VLDL) 02/11/2025 159 (H)  <130 mg/dL (calc) Final    Creatinine, Urine 02/18/2025 40  20 - 275 mg/dL Final    Microalb, Ur 02/18/2025 2.9  See Note: mg/dL Final    Microalb/Creat Ratio 02/18/2025 73 (H)  <30 mg/g creat Final    TSH w/reflex to FT4 02/11/2025 0.55  0.40 - 4.50 mIU/L Final    Color, UA 02/18/2025 YELLOW  YELLOW Final    Appearance, UA 02/18/2025 CLEAR  CLEAR Final    Specific Gravity, UA 02/18/2025 1.012  1.001 - 1.035 Final    pH, UA 02/18/2025 8.0  5.0 - 8.0 Final    Glucose, UA 02/18/2025 NEGATIVE  NEGATIVE Final    Bilirubin, UA 02/18/2025 NEGATIVE  NEGATIVE Final    Ketones, UA 02/18/2025 NEGATIVE  NEGATIVE Final    Occult Blood UA 02/18/2025 NEGATIVE  NEGATIVE Final    Protein, UA 02/18/2025 NEGATIVE  NEGATIVE Final    Nitrite, UA 02/18/2025 NEGATIVE  NEGATIVE Final    Leukocytes, UA 02/18/2025 NEGATIVE  NEGATIVE Final    WBC Casts, UA 02/18/2025 NONE SEEN  < OR = 5 /HPF Final    RBC Casts, UA 02/18/2025 NONE SEEN  < OR = 2 /HPF Final    Squam Epithel, UA 02/18/2025 NONE SEEN  < OR = 5 /HPF Final    Bacteria, UA  02/18/2025 NONE SEEN  NONE SEEN /HPF Final    Hyaline Casts, UA 02/18/2025 NONE SEEN  NONE SEEN /LPF Final    Service Cmt: 02/18/2025 SEE COMMENT   Final    Reflexive Urine Culture 02/18/2025 SEE COMMENT   Final       Past Medical History:   Diagnosis Date    Anxiety     Depression     Diabetes mellitus, type 2     GERD (gastroesophageal reflux disease)     Hyperlipidemia     Hypertension     Type 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      The 10-year CVD risk score (Rogelioino, et al., 2008) is: 33.3%    Values used to calculate the score:      Age: 74 years      Sex: Female      Diabetic: Yes      Tobacco smoker: No      Systolic Blood Pressure: 134 mmHg      Is BP treated: Yes      HDL Cholesterol: 43 mg/dL      Total Cholesterol: 202 mg/dL     Marital Status:   Alcohol History:  reports no history of alcohol use.  Tobacco History:  reports that she has quit smoking. She has never used smokeless tobacco.  Drug History:  reports no history of drug use.    Health Maintenance Topics with due status: Not Due       Topic Last Completion Date    Colorectal Cancer Screening 09/21/2017    DEXA Scan 05/20/2024    Influenza Vaccine 11/14/2024    Mammogram 12/04/2024    Lipid Panel 02/11/2025    Diabetes Urine Screening 02/18/2025    Foot Exam 06/17/2025    Hemoglobin A1c 06/17/2025    Low Dose Statin 07/04/2025     Immunization History   Administered Date(s) Administered    COVID-19, MRNA, LN-S, PF (Pfizer) (Purple Cap)  "03/08/2021, 03/29/2021    Influenza (FLUAD) - Quadrivalent - Adjuvanted - PF *Preferred* (65+) 10/04/2023    Influenza - Quadrivalent - High Dose - PF (65 years and older) 12/10/2020, 10/20/2021, 11/14/2022    Influenza - Trivalent - Fluad - Adjuvanted - PF (65 years and older 11/14/2024    Pneumococcal Conjugate - 13 Valent 05/10/2018    Pneumococcal Polysaccharide - 23 Valent 06/10/2020       Review of patient's allergies indicates:   Allergen Reactions    Flexeril [cyclobenzaprine] Other (See Comments)     Unknown was a long time ago.    Keflex [cephalexin] Diarrhea    Augmentin [amoxicillin-pot clavulanate] Other (See Comments)     Gas    Sulfa (sulfonamide antibiotics) Nausea Only     Current Medications[1]        Objective:      Vitals:    07/10/25 1442   BP: 134/70   Pulse: 94   SpO2: 98%   Weight: 62.1 kg (137 lb)   Height: 5' 2.5" (1.588 m)       Physical Exam  Vitals and nursing note reviewed.   Constitutional:       General: She is not in acute distress.     Appearance: Normal appearance. She is well-developed. She is not toxic-appearing.   HENT:      Head: Normocephalic and atraumatic.   Cardiovascular:      Rate and Rhythm: Normal rate and regular rhythm.      Heart sounds: No murmur heard.  Pulmonary:      Breath sounds: Normal breath sounds. No wheezing or rales.   Abdominal:      General: There is no distension.      Palpations: Abdomen is soft.      Tenderness: There is no abdominal tenderness. There is no right CVA tenderness or left CVA tenderness.   Skin:     General: Skin is warm and dry.      Findings: No rash.   Neurological:      General: No focal deficit present.      Mental Status: She is alert and oriented to person, place, and time.          Assessment:       1. UTI symptoms    2. Type 2 diabetes mellitus without complication, without long-term current use of insulin           Assessment & Plan    URINARY TRACT INFECTION:  - Assessed symptoms suggestive of UTI including pressure, " discomfort, increased frequency and urgency of urination that have been worsening since last week.  - Patient reports no blood or odor in urine, no fever, chills, nausea, or vomiting.  - Initiated presumptive treatment for UTI due to worsening symptoms and inability to obtain urine specimen for culture and sensitivity. Pt states has tried to give sample twice here in clinic and unable to void-  - Prescribed nitrofurantoin twice daily for 5 days based on patient's allergies to Augmentin, Keflex, and Sulfa medications.  - Instructed patient to complete entire course of antibiotics even if symptoms improve.  - Discussed importance of increased water intake and avoiding bladder irritants such as caffeinated or carbonated drinks, citrus, and acidic fluids.  -advised to call if symptoms are not improving with abx    TYPE 2 DIABETES MELLITUS:  - Monitored diabetes status and confirmed it is well-controlled based on recent A1C of 6.1 from last month.           Plan:       1. UTI symptoms  -     Cancel: POCT Urinalysis(Instrument)  -     Cancel: Urine Culture High Risk; Future; Expected date: 07/10/2025  -     nitrofurantoin, macrocrystal-monohydrate, (MACROBID) 100 MG capsule; Take 1 capsule (100 mg total) by mouth 2 (two) times daily.  Dispense: 10 capsule; Refill: 0    2. Type 2 diabetes mellitus without complication, without long-term current use of insulin      Follow up if symptoms worsen or fail to improve.          This note was generated with the assistance of ambient listening technology. Verbal consent was obtained by the patient and accompanying visitor(s) for the recording of patient appointment to facilitate this note. I attest to having reviewed and edited the generated note for accuracy, though some syntax or spelling errors may persist. Please contact the author of this note for any clarification.       7/10/2025 Barbara Roldan NP           [1]   Current Outpatient Medications:     ALPRAZolam (XANAX) 1 MG  tablet, Take 1 tablet (1 mg total) by mouth every evening., Disp: 30 tablet, Rfl: 2    atorvastatin (LIPITOR) 80 MG tablet, Take 1 tablet (80 mg total) by mouth once daily., Disp: 90 tablet, Rfl: 3    blood-glucose meter kit, To check BG 1 times daily, to use with insurance preferred meter, Disp: 1 each, Rfl: 0    calcium carbonate/vitamin D3 (CALTRATE 600 PLUS D ORAL), Caltrate 600 plus D, Disp: , Rfl:     clobetasoL (CLOBEX) 0.05 % shampoo, Apply topically twice a week., Disp: 118 mL, Rfl: 0    famotidine (PEPCID) 40 MG tablet, Take 1 tablet (40 mg total) by mouth once daily., Disp: 90 tablet, Rfl: 1    glipiZIDE (GLUCOTROL) 5 MG tablet, Take 1 tablet (5 mg total) by mouth daily with breakfast., Disp: 90 tablet, Rfl: 1    hydrALAZINE (APRESOLINE) 25 MG tablet, Take 1 tablet (25 mg total) by mouth 2 (two) times a day., Disp: 180 tablet, Rfl: 3    hydroCHLOROthiazide (HYDRODIURIL) 12.5 MG Tab, Take 2 tablets (25 mg total) by mouth once daily., Disp: 180 tablet, Rfl: 3    HYDROcodone-acetaminophen (NORCO)  mg per tablet, Take 1 tablet by mouth every 6 (six) hours as needed for Pain., Disp: , Rfl:     krill-om-3-dha-epa-phospho-ast (KRILL OIL) 1,281-585-11-80 mg Cap, Take 1 Capful by mouth 2 (two) times a day., Disp: 60 capsule, Rfl: 1    Lactobacillus rhamnosus GG (CULTURELLE) 10 billion cell capsule, Take 1 capsule by mouth once daily., Disp: , Rfl:     losartan (COZAAR) 100 MG tablet, Take 1 tablet (100 mg total) by mouth once daily., Disp: 90 tablet, Rfl: 0    meloxicam (MOBIC) 15 MG tablet, Take 1 tablet (15 mg total) by mouth once daily., Disp: 90 tablet, Rfl: 1    metFORMIN (GLUCOPHAGE) 500 MG tablet, Take 1 tablet (500 mg total) by mouth 2 (two) times daily., Disp: 180 tablet, Rfl: 3    nitrofurantoin, macrocrystal-monohydrate, (MACROBID) 100 MG capsule, Take 1 capsule (100 mg total) by mouth 2 (two) times daily., Disp: 10 capsule, Rfl: 0    ondansetron (ZOFRAN-ODT) 4 MG TbDL, Take 1 tablet (4 mg total)  by mouth every 8 (eight) hours as needed (nausea)., Disp: 20 tablet, Rfl: 1    pantoprazole (PROTONIX) 40 MG tablet, Take 1 tablet (40 mg total) by mouth once daily., Disp: 90 tablet, Rfl: 3    potassium chloride (MICRO-K) 10 MEQ CpSR, Take 1 capsule (10 mEq total) by mouth once daily., Disp: 90 capsule, Rfl: 3    tirzepatide (MOUNJARO) 2.5 mg/0.5 mL PnIj, Inject 2.5 mg into the skin every 7 days., Disp: 2 mL, Rfl: 0    tretinoin (RETIN-A) 0.05 % cream, Apply topically every evening., Disp: 20 g, Rfl: 3    triamcinolone acetonide 0.1% (KENALOG) 0.1 % cream, Apply topically 2 (two) times daily., Disp: 80 g, Rfl: 1    valACYclovir (VALTREX) 1000 MG tablet, Take 2 tablets (2,000 mg total) by mouth every 12 (twelve) hours., Disp: 24 tablet, Rfl: 0

## 2025-08-26 DIAGNOSIS — F41.9 ANXIETY: ICD-10-CM

## 2025-08-26 RX ORDER — ALPRAZOLAM 1 MG/1
1 TABLET ORAL NIGHTLY
Qty: 30 TABLET | Refills: 2 | Status: SHIPPED | OUTPATIENT
Start: 2025-08-29

## 2025-08-28 ENCOUNTER — PATIENT OUTREACH (OUTPATIENT)
Dept: ADMINISTRATIVE | Facility: HOSPITAL | Age: 75
End: 2025-08-28
Payer: MEDICARE

## 2025-08-28 DIAGNOSIS — I10 ESSENTIAL HYPERTENSION: ICD-10-CM

## 2025-08-28 RX ORDER — HYDRALAZINE HYDROCHLORIDE 25 MG/1
25 TABLET, FILM COATED ORAL 2 TIMES DAILY
Qty: 180 TABLET | Refills: 3 | Status: SHIPPED | OUTPATIENT
Start: 2025-08-28

## 2025-09-02 ENCOUNTER — TELEPHONE (OUTPATIENT)
Dept: FAMILY MEDICINE | Facility: CLINIC | Age: 75
End: 2025-09-02
Payer: MEDICARE

## 2025-09-02 ENCOUNTER — PATIENT OUTREACH (OUTPATIENT)
Dept: ADMINISTRATIVE | Facility: HOSPITAL | Age: 75
End: 2025-09-02
Payer: MEDICARE

## 2025-09-02 DIAGNOSIS — E11.9 TYPE 2 DIABETES MELLITUS WITHOUT COMPLICATION, WITHOUT LONG-TERM CURRENT USE OF INSULIN: ICD-10-CM

## 2025-09-02 DIAGNOSIS — I10 ESSENTIAL HYPERTENSION: ICD-10-CM

## 2025-09-02 DIAGNOSIS — D64.9 ANEMIA, UNSPECIFIED TYPE: ICD-10-CM

## 2025-09-02 DIAGNOSIS — Z79.899 OTHER LONG TERM (CURRENT) DRUG THERAPY: Primary | ICD-10-CM

## 2025-09-02 PROBLEM — M79.10 MYALGIA: Status: ACTIVE | Noted: 2025-09-02

## 2025-09-03 LAB
ALBUMIN SERPL-MCNC: 4.2 G/DL (ref 3.6–5.1)
ALBUMIN/GLOB SERPL: 2.2 (CALC) (ref 1–2.5)
ALP SERPL-CCNC: 68 U/L (ref 37–153)
ALT SERPL-CCNC: 17 U/L (ref 6–29)
AST SERPL-CCNC: 19 U/L (ref 10–35)
BILIRUB SERPL-MCNC: 0.7 MG/DL (ref 0.2–1.2)
BUN SERPL-MCNC: 27 MG/DL (ref 7–25)
BUN/CREAT SERPL: 23 (CALC) (ref 6–22)
CALCIUM SERPL-MCNC: 9.4 MG/DL (ref 8.6–10.4)
CHLORIDE SERPL-SCNC: 100 MMOL/L (ref 98–110)
CHOLEST SERPL-MCNC: 173 MG/DL
CHOLEST/HDLC SERPL: 4.1 (CALC)
CO2 SERPL-SCNC: 28 MMOL/L (ref 20–32)
CREAT SERPL-MCNC: 1.16 MG/DL (ref 0.6–1)
EGFR: 49 ML/MIN/1.73M2
GLOBULIN SER CALC-MCNC: 1.9 G/DL (CALC) (ref 1.9–3.7)
GLUCOSE SERPL-MCNC: 141 MG/DL (ref 65–99)
HBA1C MFR BLD: 6 %
HDLC SERPL-MCNC: 42 MG/DL
LDLC SERPL CALC-MCNC: 89 MG/DL (CALC)
NONHDLC SERPL-MCNC: 131 MG/DL (CALC)
POTASSIUM SERPL-SCNC: 4.7 MMOL/L (ref 3.5–5.3)
PROT SERPL-MCNC: 6.1 G/DL (ref 6.1–8.1)
SODIUM SERPL-SCNC: 136 MMOL/L (ref 135–146)
TRIGL SERPL-MCNC: 315 MG/DL